# Patient Record
Sex: MALE | Race: WHITE | NOT HISPANIC OR LATINO | Employment: UNEMPLOYED | URBAN - METROPOLITAN AREA
[De-identification: names, ages, dates, MRNs, and addresses within clinical notes are randomized per-mention and may not be internally consistent; named-entity substitution may affect disease eponyms.]

---

## 2017-01-21 ENCOUNTER — LAB CONVERSION - ENCOUNTER (OUTPATIENT)
Dept: OTHER | Facility: OTHER | Age: 52
End: 2017-01-21

## 2017-01-21 LAB
HBA1C MFR BLD HPLC: 5.9 % OF TOTAL HGB
TSH SERPL DL<=0.05 MIU/L-ACNC: 1.28 MIU/L (ref 0.4–4.5)

## 2017-01-22 ENCOUNTER — LAB CONVERSION - ENCOUNTER (OUTPATIENT)
Dept: OTHER | Facility: OTHER | Age: 52
End: 2017-01-22

## 2017-01-22 ENCOUNTER — GENERIC CONVERSION - ENCOUNTER (OUTPATIENT)
Dept: OTHER | Facility: OTHER | Age: 52
End: 2017-01-22

## 2017-01-22 LAB
BILIRUB UR QL STRIP: NEGATIVE
COLOR UR: YELLOW
COMMENT (HISTORICAL): CLEAR
FECAL OCCULT BLOOD DIAGNOSTIC (HISTORICAL): NEGATIVE
GLUCOSE (HISTORICAL): NEGATIVE
KETONES UR STRIP-MCNC: NEGATIVE MG/DL
LEUKOCYTE ESTERASE UR QL STRIP: NEGATIVE
NITRITE UR QL STRIP: NEGATIVE
PH UR STRIP.AUTO: 7.5 [PH] (ref 5–8)
PROT UR STRIP-MCNC: NEGATIVE MG/DL
SP GR UR STRIP.AUTO: 1.02 (ref 1–1.03)

## 2017-03-15 ENCOUNTER — ALLSCRIPTS OFFICE VISIT (OUTPATIENT)
Dept: OTHER | Facility: OTHER | Age: 52
End: 2017-03-15

## 2017-06-16 DIAGNOSIS — C64.9 MALIGNANT NEOPLASM OF KIDNEY EXCLUDING RENAL PELVIS (HCC): ICD-10-CM

## 2017-07-18 ENCOUNTER — HOSPITAL ENCOUNTER (OUTPATIENT)
Dept: CT IMAGING | Facility: HOSPITAL | Age: 52
Discharge: HOME | End: 2017-07-18
Attending: SPECIALIST
Payer: COMMERCIAL

## 2017-07-18 DIAGNOSIS — C64.9 MALIGNANT NEOPLASM OF KIDNEY EXCLUDING RENAL PELVIS (HCC): ICD-10-CM

## 2017-07-18 DIAGNOSIS — M17.12 PRIMARY OSTEOARTHRITIS OF LEFT KNEE: ICD-10-CM

## 2017-07-18 PROCEDURE — 71260 CT THORAX DX C+: CPT

## 2017-07-18 PROCEDURE — 74177 CT ABD & PELVIS W/CONTRAST: CPT

## 2017-07-18 RX ADMIN — IODIXANOL 100 ML: 320 INJECTION, SOLUTION INTRAVASCULAR at 08:10

## 2017-07-19 ENCOUNTER — ALLSCRIPTS OFFICE VISIT (OUTPATIENT)
Dept: OTHER | Facility: OTHER | Age: 52
End: 2017-07-19

## 2017-07-19 ENCOUNTER — TRANSCRIBE ORDERS (OUTPATIENT)
Dept: ADMINISTRATIVE | Facility: HOSPITAL | Age: 52
End: 2017-07-19

## 2017-07-19 DIAGNOSIS — C64.9 ADENOCARCINOMA, RENAL CELL, UNSPECIFIED LATERALITY (HCC): Primary | ICD-10-CM

## 2017-08-10 ENCOUNTER — OFFICE VISIT (OUTPATIENT)
Dept: URGENT CARE | Facility: MEDICAL CENTER | Age: 52
End: 2017-08-10
Payer: COMMERCIAL

## 2017-08-10 ENCOUNTER — APPOINTMENT (OUTPATIENT)
Dept: RADIOLOGY | Facility: MEDICAL CENTER | Age: 52
End: 2017-08-10
Payer: COMMERCIAL

## 2017-08-10 DIAGNOSIS — M17.12 PRIMARY OSTEOARTHRITIS OF LEFT KNEE: ICD-10-CM

## 2017-08-10 PROCEDURE — S9083 URGENT CARE CENTER GLOBAL: HCPCS

## 2017-08-10 PROCEDURE — 73564 X-RAY EXAM KNEE 4 OR MORE: CPT

## 2017-08-10 PROCEDURE — G0382 LEV 3 HOSP TYPE B ED VISIT: HCPCS

## 2017-09-15 DIAGNOSIS — I10 ESSENTIAL (PRIMARY) HYPERTENSION: ICD-10-CM

## 2017-09-15 DIAGNOSIS — R73.9 HYPERGLYCEMIA: ICD-10-CM

## 2017-09-15 DIAGNOSIS — E78.5 HYPERLIPIDEMIA: ICD-10-CM

## 2017-09-21 ENCOUNTER — ALLSCRIPTS OFFICE VISIT (OUTPATIENT)
Dept: OTHER | Facility: OTHER | Age: 52
End: 2017-09-21

## 2017-10-04 ENCOUNTER — GENERIC CONVERSION - ENCOUNTER (OUTPATIENT)
Dept: OTHER | Facility: OTHER | Age: 52
End: 2017-10-04

## 2017-10-07 ENCOUNTER — LAB CONVERSION - ENCOUNTER (OUTPATIENT)
Dept: OTHER | Facility: OTHER | Age: 52
End: 2017-10-07

## 2017-10-07 LAB — CULTURE RESULT (HISTORICAL): NORMAL

## 2017-10-18 ENCOUNTER — ALLSCRIPTS OFFICE VISIT (OUTPATIENT)
Dept: OTHER | Facility: OTHER | Age: 52
End: 2017-10-18

## 2017-11-01 ENCOUNTER — ALLSCRIPTS OFFICE VISIT (OUTPATIENT)
Dept: OTHER | Facility: OTHER | Age: 52
End: 2017-11-01

## 2018-01-10 ENCOUNTER — ALLSCRIPTS OFFICE VISIT (OUTPATIENT)
Dept: OTHER | Facility: OTHER | Age: 53
End: 2018-01-10

## 2018-01-10 NOTE — PROGRESS NOTES
Chief Complaint  HERE TODAY FOR BP CHECK- LEFT /90      Active Problems    1  H/O renal cell carcinoma (V10 52) (Z85 528)   2  Hyperglycemia (790 29) (R73 9)   3  Hyperlipidemia (272 4) (E78 5)   4  Hypertension (401 9) (I10)   5  Left knee pain (719 46) (M25 562)   6  Left otitis externa (380 10) (H60 92)   7  History of 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(198 3) (C79 31)   8  Need for vaccination (V05 9) (Z23)   9  Osteoarthritis of left knee (715 96) (M17 12)   10  Renal cell carcinoma, unspecified laterality (189 0) (C64 9)   11  Right knee pain (719 46) (M25 561)   12  Rupture of left biceps tendon (840 8) (S46 212A)   13  Vitamin D deficiency (268 9) (E55 9)    Current Meds   1  [employer]; On Monday- Wednesday-Friday check sugars   fasting and at 4pm  On Hhhhrfs-Vwnwofpe-Brkdpawx check sugars 11am and 7pm;   Therapy: 21WWH1658 to (Evaluate:07Jan2018)  Requested for: 72Nch4283; Last   Rx:05Qxn6387 Ordered   2  Angela Microlet Lancets Miscellaneous; check fasting and 4pm sugars on Mon, Wed, and   Fri  Check 11AM and 7PMsugars on Tues, Thurs, and Sat; Therapy: 97EHW1167 to (Evaluate:01Owg4454)  Requested for: 47Zli9987; Last   Rx:19Flp1070 Ordered   3  Fenofibrate 160 MG Oral Tablet; take one tablet by mouth daily; Therapy: 94WQU2198 to (Chichi Morris)  Requested for: 06RBC2407; Last   Rx:05Jun2017 Ordered   4  HydrALAZINE HCl - 100 MG Oral Tablet; take one tablet by mouth twice daily; Therapy: 28YZK1402 to [phone]  Requested for: 22Twn7301; Last   Rx:91Ppl3554 Ordered   5  Hydrocortisone-Acetic Acid 1-2 % Otic Solution; INSTILL 3 DROP Twice daily; Therapy: 43RKU4741 to (Last Rx:04Oct2017)  Requested for: 60UIV7862 Ordered   6  LORazepam 0 5 MG Oral Tablet; TAKE 1 TABLET EVERY 12 HOURS AS NEEDED; Therapy: 49EVW8091 to (Evaluate:93Ipc4790)  Requested for: 81GET3577; Last   Rx:29Jun2016 Ordered   7   Simvastatin 80 MG Oral Tablet; take one tablet by mouth daily; Therapy: 94GWA6364 to (Evaluate:01Apr2018)  Requested for: 03Oct2017; Last   Rx:86Oqk2684 Ordered   8  Valsartan-Hydrochlorothiazide 320-25 MG Oral Tablet; Take 1 tablet daily; Therapy: 12QAU1915 to (Last Rx:04Oct2017)  Requested for: 98JAM1584 Ordered   9  Vitamin D3 2000 UNIT Oral Capsule; TAKE 2 CAPSULE Daily; Therapy: 71Eef2869 to Recorded    Allergies    1  Sulfa Drugs   2  NIFEdipine    Vitals  Signs    Systolic: 979  Diastolic: 90    Plan  Hypertension    · AmLODIPine Besylate 5 MG Oral Tablet; TAKE 1 TABLET DAILY    At amlodipine, over the in 2 weeks for blood pressure check       Future Appointments    Date/Time Provider Specialty Site   01/10/2018 09:30 AM Marybel Dotson DO Family Medicine [facility]     Signatures   Electronically signed by : Mackenzie Naidu DO; Oct 18 2017  8:59AM EST                       (Author)

## 2018-01-11 NOTE — RESULT NOTES
Verified Results  (1) EAR CULTURE [id number] 03:41PM Monica Walden Order Number: [id number]     Test Name Result Flag Reference   CLINICAL REPORT (Report)     Test:        Ear culture  Specimen Type: Other  Specimen Date:   7/18/2016 3:41 PM  Result Date:    7/20/2016 12:09 PM  Result Status:   Final result  Resulting Lab:   [facility] [address] [place] [address]            Tel: [phone]      CULTURE                                       ------------------                                   3+ Growth of Pseudomonas aeruginosa      STAIN                                        ------------------                                   No polys seen    2+ Gram negative rods      SUSCEPTIBILITY                                   ------------------                                                    Pseudomonas aeruginosa  METHOD              CELY  -------------------------------  -------------------------  AZTREONAM ($$$)          <=8 ug/ml   Susceptible  CEFEPIME ($)           <=8 ug/ml   Susceptible  CEFTAZIDIME ($$)         4 ug/ml    Susceptible  CIPROFLOXACIN ($)         <=1 00 ug/ml Susceptible  GENTAMICIN ($$)          >8 ug/ml   Resistant  IMIPENEM             <=4 ug/ml   Susceptible  LEVOFLOXACIN ($)         <=2 00 ug/ml Susceptible  MEROPENEM ($$)          <=4 00 ug/ml Susceptible  PIPERACILLIN + TAZOBACTAM ($$$)  >64 ug/ml   Resistant  TICARCILLIN/K CLAVULANATE     <=16 ug/ml  Susceptible  TOBRAMYCIN ($)          <=4 ug/ml   Susceptible       Discussion/Summary   Culture showed a bacteria that should be sensitive to the antibiotic drops I gave him  Let me know how he is doing

## 2018-01-11 NOTE — PROGRESS NOTES
Assessment   1  Hypertension (401 9) (I10)   2  Hyperlipidemia (272 4) (E78 5)   3  H/O renal cell carcinoma (V10 52) (Z85 528)   4  Hyperglycemia (790 29) (R73 9)   5  Vitamin D deficiency (268 9) (E55 9)   6  Palpitations (785 1) (R00 2)   7  Encounter for prostate cancer screening (V76 44) (Z12 5)    Plan   Encounter for prostate cancer screening    · (1) PSA (SCREEN) (Dx V76 44 Screen for Prostate Cancer); Status:Active; Requested    for:15Jrd4575;   Hyperglycemia    · (1) HEMOGLOBIN A1C; Status:Active; Requested for:73Sdd4898;   Hyperlipidemia    · (1) LIPID PANEL, FASTING; Status:Active; Requested for:84Gtu4251;    · (1) TSH; Status:Active; Requested for:07Gfy2043;   Hypertension    · (1) CBC/PLT/DIFF; Status:Active; Requested for:03Ghv9925;    · (1) COMPREHENSIVE METABOLIC PANEL; Status:Active; Requested for:50Qha1966;    · (1) URINALYSIS (will reflex a microscopy if leukocytes, occult blood, protein or nitrites are    not within normal limits); Status:Active; Requested for:77Rwe0202; Palpitations    · EKG/ECG- POC; Status:Active - Perform Order; Requested PGD:53VEY0971;   Vitamin D deficiency    · (1) VITAMIN D 25-HYDROXY; Status:Active; Requested for:83Wtq5575; Follow-up with Oncology in the summer  Due for lab work in April  Call the office if there are any problems  Continue current medications  Discussion/Summary   Recommend he decrease his caffeine consumption  If symptoms do not improve or if they get worse, I will refer him to Cardiology  Possible side effects of new medications were reviewed with the patient/guardian today  The treatment plan was reviewed with the patient/guardian  The patient/guardian understands and agrees with the treatment plan      Chief Complaint   follow up lipid,htn   no refills needed review blood work      History of Present Illness   Patient is a 49-year-old male presenting to the office for follow-up on his hypertension, hyperlipidemia, history of renal cell carcinoma, and general health  He is complaining of occasional palpitations  C2 mostly after meals in the morning  The do not occur with physical activity, stress, or any other triggering time  Review of Systems        Constitutional: recent 5 lb weight gain-- and-- Tends not to exercise in the winter  Eyes: No complaints of eye pain, no red eyes, no discharge from eyes, no itchy eyes  ENT: no complaints of earache, no hearing loss, no nosebleeds, no nasal discharge, no sore throat, no hoarseness  Cardiovascular: No complaints of slow heart rate, no fast heart rate, no chest pain, no palpitations, no leg claudication, no lower extremity  Respiratory: No complaints of shortness of breath, no wheezing, no cough, no SOB on exertion, no orthopnea or PND  Gastrointestinal: No complaints of abdominal pain, no constipation, no nausea or vomiting, no diarrhea or bloody stools  Genitourinary: No complaints of dysuria, no incontinence, no hesitancy, no nocturia, no genital lesion, no testicular pain  Musculoskeletal: No complaints of arthralgia, no myalgias, no joint swelling or stiffness, no limb pain or swelling  Integumentary: No complaints of skin rash or skin lesions, no itching, no skin wound, no dry skin  Neurological: No compliants of headache, no confusion, no convulsions, no numbness or tingling, no dizziness or fainting, no limb weakness, no difficulty walking  Psychiatric: Is not suicidal, no sleep disturbances, no anxiety or depression, no change in personality, no emotional problems  Endocrine: No complaints of proptosis, no hot flashes, no muscle weakness, no erectile dysfunction, no deepening of the voice, no feelings of weakness  Hematologic/Lymphatic: No complaints of swollen glands, no swollen glands in the neck, does not bleed easily, no easy bruising  Active Problems   1  H/O renal cell carcinoma (V10 52) (Z88 291)   2  Hyperglycemia (790 29) (R73 9)   3  Hyperlipidemia (272 4) (E78 5)   4  Hypertension (401 9) (I10)   5  History of 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(198 3) (C79 31)   6  Osteoarthritis of left knee (715 96) (M17 12)   7  Renal cell carcinoma, unspecified laterality (189 0) (C64 9)   8  Vitamin D deficiency (268 9) (E55 9)    Past Medical History   1  History of Acute bronchitis (466 0) (J20 9)   2  History of Acute otitis externa of right ear, unspecified type (380 10) (H60 501)   3  History of Acute swimmer's ear of both sides (380 12) (H60 333)   4  History of Anal fissure (565 0) (K60 2)   5  History of Colonoscopy (Fiberoptic) Screening   6  History of CT Lung Pulmonary Nodule (< 6cm )   7  History of Elbow pain (719 42) (M25 529)   8  History of Encounter for prostate cancer screening (V76 44) (Z12 5)   9  H/O renal cell carcinoma (V10 52) (Z85 528)   10  History of anxiety disorder (V11 8) (Z86 59)   11  History of Left elbow contusion (923 11) (S50 02XA)   12  History of Left knee pain (719 46) (M25 562)   13  History of Left knee pain (719 46) (M25 562)   14  History of Left knee pain (719 46) (M25 562)   15  History of Left otitis externa (380 10) (H60 92)   16  History of Malignant neoplasm of kidney excluding renal pelvis, unspecified laterality      (189 0) (C64 9)   17  History of 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(198 3) (C79 31)   18  History of Need for vaccination (V05 9) (Z23)   19  History of Overweight (278 02) (E66 3)   20  History of Perirectal Cellulitis (566)   21  History of Rectal bleeding (569 3) (K62 5)   22  History of Right knee pain (719 46) (M25 561)   23  History of Rupture of left biceps tendon (840 8) (B61 578M)     The active problems and past medical history were reviewed and updated today  Surgical History   1  History of Closed Treatment Of Bimalleolar Ankle Fracture   2  History of Colonoscopy (Fiberoptic) Screening   3  History of Nephrectomy   4   History of Umbilical Hernia Repair    Family History   Mother    1  No pertinent family history  Family History    2  Family history of Diabetes Mellitus (V18 0)   3  Family history of Hypertension (V17 49)   4  Family history of Visual Impairment (V19 0)    Social History    · Being A Social Drinker   · Denied: History of Drug Use   · Former smoker (F60 68) (C57 261)  The social history was reviewed and updated today  The social history was reviewed and is unchanged  Current Meds    1  AmLODIPine Besylate 5 MG Oral Tablet; TAKE 1 TABLET DAILY; Therapy: 98YCD5734 to (Evaluate:13Oct2018)  Requested for: 92KQC0978; Last     Rx:18Oct2017 Ordered   2  [employer]; On Monday- Wednesday-Friday check sugars     fasting and at 4pm  On Dfnenxd-Hlwhimfn-Mshlaazk check sugars 11am and 7pm;     Therapy: 65GMX2347 to (Evaluate:07Jan2018)  Requested for: 47Zzy1202; Last     Rx:63Jrv2433 Ordered   3  Ang[employer] Lancets Miscellaneous; check fasting and 4pm sugars on Mon, Wed, and     Fri  Check 11AM and 7PMsugars on Tues, Thurs, and Sat; Therapy: 73UFN7978 to (Evaluate:12May2018)  Requested for: 56Nsa6024; Last     Rx:63Uil4946 Ordered   4  Fenofibrate 160 MG Oral Tablet; take one tablet by mouth daily; Therapy: 97GKT8121 to (Mercedes Smith)  Requested for: 09HLF6811; Last     Rx:05Jun2017 Ordered   5  HydrALAZINE HCl - 100 MG Oral Tablet; take one tablet by mouth twice daily; Therapy: 65CRP1316 to (Abdon Macdonald)  Requested for: 74LMH6822; Last     Rx:02Nov2017 Ordered   6  Hydrocortisone-Acetic Acid 1-2 % Otic Solution; INSTILL 3 DROP Twice daily; Therapy: 71OXR6301 to (Last Rx:04Oct2017)  Requested for: 82PPI3920 Ordered   7  LORazepam 0 5 MG Oral Tablet; TAKE 1 TABLET EVERY 12 HOURS AS NEEDED; Therapy: 26BEW4775 to (Evaluate:91Tpe1776)  Requested for: 77XNL7198; Last     Rx:29Jun2016 Ordered   8  Simvastatin 80 MG Oral Tablet; take one tablet by mouth daily;      Therapy: 55XCH4195 to (Evaluate:01Apr2018) Requested for: 74SBU7851; Last     Rx:03Oct2017 Ordered   9  Valsartan-Hydrochlorothiazide 320-25 MG Oral Tablet; Take 1 tablet daily; Therapy: 88SGM8752 to (Last Rx:04Oct2017)  Requested for: 78WJD6270 Ordered   10  Vitamin D3 2000 UNIT Oral Capsule; TAKE 2 CAPSULE Daily; Therapy: 09Mbi9775 to Recorded     The medication list was reviewed and updated today  Allergies   1  Sulfa Drugs   2  NIFEdipine    Vitals   Vital Signs    Recorded: 42LZY4964 11:07KH   Systolic 424   Diastolic 80   Height 5 ft 11 in   Weight 243 lb    BMI Calculated 33 89   BSA Calculated 2 29     Physical Exam        Constitutional      General appearance: Abnormal   overweight  Eyes      Conjunctiva and lids: No swelling, erythema, or discharge  Pupils and irises: Equal, round and reactive to light  Ears, Nose, Mouth, and Throat      External inspection of ears and nose: Normal        Otoscopic examination: Tympanic membrance translucent with normal light reflex  Canals patent without erythema  Nasal mucosa, septum, and turbinates: Normal without edema or erythema  Oropharynx: Normal with no erythema, edema, exudate or lesions  Pulmonary      Respiratory effort: No increased work of breathing or signs of respiratory distress  Auscultation of lungs: Clear to auscultation, equal breath sounds bilaterally, no wheezes, no rales, no rhonci  Cardiovascular      Palpation of heart: Normal PMI, no thrills  Auscultation of heart: Normal rate and rhythm, normal S1 and S2, without murmurs  Examination of extremities for edema and/or varicosities: Normal        Carotid pulses: Normal        Abdomen      Abdomen: Non-tender, no masses  Liver and spleen: No hepatomegaly or splenomegaly  Lymphatic      Palpation of lymph nodes in neck: No lymphadenopathy  Musculoskeletal      Gait and station: Normal        Digits and nails: Normal without clubbing or cyanosis  Inspection/palpation of joints, bones, and muscles: Normal        Skin      Skin and subcutaneous tissue: Normal without rashes or lesions  Neurologic      Cranial nerves: Cranial nerves 2-12 intact  Reflexes: 2+ and symmetric  Sensation: No sensory loss  Psychiatric      Orientation to person, place and time: Normal        Mood and affect: Normal           Results/Data   A 12 lead ECG was performed and was normal -- No acute ischemia  Rhythm and rate: normal sinus rhythm        P-waves: the P wave is normal       QRS: the QRS is normal       ST segment: the ST segments are normal       Signatures    Electronically signed by : Josefa Valdez DO; Kendall 10 2018  9:59AM EST                       (Author)

## 2018-01-11 NOTE — RESULT NOTES
Verified Results  (Q) URINALYSIS REFLEX [id number] 08:46AM [facility]   REPORT COMMENT:  SPLIT 01/20/2017 FROM [id number]     Test Name Result Flag Reference   COLOR YELLOW  YELLOW   APPEARANCE CLEAR  CLEAR   SPECIFIC GRAVITY 1 018  1 001-1 035   [facility] 7 5  5 0-8 0   GLUCOSE NEGATIVE  NEGATIVE   BILIRUBIN NEGATIVE  NEGATIVE   KETONES NEGATIVE  NEGATIVE   OCCULT BLOOD NEGATIVE  NEGATIVE   PROTEIN NEGATIVE  NEGATIVE   NITRITE NEGATIVE  NEGATIVE   LEUKOCYTE ESTERASE NEGATIVE  NEGATIVE     (1) COMPREHENSIVE METABOLIC PANEL 98OUR7591 10:03FU [facility]     Test Name Result Flag Reference   GLUCOSE 99 mg/dL  65-99   Fasting reference interval   UREA NITROGEN (BUN) 22 mg/dL  7-25   CREATININE 1 23 mg/dL  0 70-1 33   For patients >52years of age, the reference limit  for Creatinine is approximately 13% higher for people  identified as -American  eGFR NON-AFR  AMERICAN 68 mL/min/1 73m2  > OR = 60   eGFR AFRICAN AMERICAN 78 mL/min/1 73m2  > OR = 60   AST 19 U/L  10-35   ALT 29 U/L  9-46   PROTEIN, TOTAL 7 0 g/dL  6 1-8 1   ALBUMIN 4 2 g/dL  3 6-5 1   GLOBULIN 2 8 g/dL (calc)  1 9-3 7   ALBUMIN/GLOBULIN RATIO 1 5 (calc)  1 0-2 5   BILIRUBIN, TOTAL 0 6 mg/dL  0 2-1 2   ALKALINE PHOSPHATASE 36 U/L L    BUN/CREATININE RATIO   0-48   NOT APPLICABLE (calc)   SODIUM 136 mmol/L  135-146   POTASSIUM 4 2 mmol/L  3 5-5 3   CHLORIDE 102 mmol/L     CARBON DIOXIDE 24 mmol/L  20-31   CALCIUM 9 2 mg/dL  8 6-10 3     (1) LIPID PANEL, FASTING 20Jan2017 11:07AM [facility]     Test Name Result Flag Reference   CHOLESTEROL, TOTAL 166 mg/dL  125-200   HDL CHOLESTEROL 37 mg/dL L > OR = 40   TRIGLICERIDES 666 mg/dL  <150   LDL-CHOLESTEROL 99 mg/dL (calc)  <130   Desirable range <100 mg/dL for patients with CHD or  diabetes and <70 mg/dL for diabetic patients with  known heart disease     CHOL/HDLC RATIO 4 5 (calc)  < OR = 5 0   NON HDL CHOLESTEROL 129 mg/dL (calc)     Target for non-HDL cholesterol is 30 mg/dL higher than LDL cholesterol target  (1) CBC/PLT/DIFF 67NXY8800 11:07AM Kimberly Jarrell     Test Name Result Flag Reference   WHITE BLOOD CELL COUNT 6 4 Thousand/uL  3 8-10 8   RED BLOOD CELL COUNT 5 10 Million/uL  4 20-5 80   HEMOGLOBIN 15 2 g/dL  13 2-17 1   HEMATOCRIT 45 4 %  38 5-50 0   MCV 89 1 fL  80 0-100 0   MCH 29 9 pg  27 0-33 0   EOSINOPHILS 3 9 %     BASOPHILS 0 4 %     ABSOLUTE MONOCYTES 736 cells/uL  200-950   ABSOLUTE EOSINOPHILS 250 cells/uL     ABSOLUTE BASOPHILS 26 cells/uL  0-200   NEUTROPHILS 62 9 %     LYMPHOCYTES 21 3 %     MONOCYTES 11 5 %     MCHC 33 5 g/dL  32 0-36 0   RDW 13 6 %  11 0-15 0   PLATELET COUNT 642 Thousand/uL  140-400   MPV 8 7 fL  7 5-11 5   ABSOLUTE NEUTROPHILS 4026 cells/uL  2384-6753   ABSOLUTE LYMPHOCYTES 1363 cells/uL  850-3900     (Q) TSH, 3RD GENERATION 20Jan2017 11:07AM Kimberly Jarrell     Test Name Result Flag Reference   TSH 1 28 mIU/L  0 40-4 50     (Q) HEMOGLOBIN A1c 20Jan2017 11:07AM Kimberly Jarrell   REPORT COMMENT:  FASTING:YES  AN UPDATE OR CORRECTION HAS BEEN MADE TO NAME COLLECTION KIT     Test Name Result Flag Reference   HEMOGLOBIN A1c 5 9 % of total Hgb H <5 7   According to ADA guidelines, hemoglobin A1c <7 0%  represents optimal control in non-pregnant diabetic  patients  Different metrics may apply to specific  patient populations  Standards of Medical Care in    Diabetes Care  2013;36:s11-s66     For the purpose of screening for the presence of  diabetes  <5 7%       Consistent with the absence of diabetes  5 7-6 4%    Consistent with increased risk for diabetes              (prediabetes)  >or=6 5%    Consistent with diabetes     This assay result is consistent with an increased risk  of diabetes  Currently, no consensus exists for use of hemoglobin  A1c for diagnosis of diabetes for children  Discussion/Summary   Lab work is okay  Continue current medications  Will discuss when he comes in for his next checkup

## 2018-01-12 NOTE — RESULT NOTES
Verified Results  (1) LIPID PANEL, FASTING [id number] 10:09AM Shiva Spice     Test Name Result Flag Reference   CHOLESTEROL, TOTAL 166 mg/dL  125-200   HDL CHOLESTEROL 33 mg/dL L > OR = 40   TRIGLICERIDES 852 mg/dL  <150   LDL-CHOLESTEROL 104 mg/dL (calc)  <130   Desirable range <100 mg/dL for patients with CHD or  diabetes and <70 mg/dL for diabetic patients with  known heart disease  CHOL/HDLC RATIO 5 0 (calc)  < OR = 5 0   NON HDL CHOLESTEROL 133 mg/dL (calc)     Target for non-HDL cholesterol is 30 mg/dL higher than   LDL cholesterol target  (1) COMPREHENSIVE METABOLIC PANEL [id number] 44:20ZW Shiva Spice     Test Name Result Flag Reference   GLUCOSE 95 mg/dL  65-99   Fasting reference interval   UREA NITROGEN (BUN) 21 mg/dL  7-25   CREATININE 1 29 mg/dL  0 70-1 33   For patients >52years of age, the reference limit  for Creatinine is approximately 13% higher for people  identified as -American  eGFR NON-AFR   AMERICAN 64 mL/min/1 73m2  > OR = 60   eGFR AFRICAN AMERICAN 74 mL/min/1 73m2  > OR = 60   BUN/CREATININE RATIO   9-05   NOT APPLICABLE (calc)   SODIUM 135 mmol/L  135-146   POTASSIUM 4 1 mmol/L  3 5-5 3   CHLORIDE 103 mmol/L     CARBON DIOXIDE 27 mmol/L  19-30   CALCIUM 9 4 mg/dL  8 6-10 3   PROTEIN, TOTAL 7 0 g/dL  6 1-8 1   ALBUMIN 4 3 g/dL  3 6-5 1   GLOBULIN 2 7 g/dL (calc)  1 9-3 7   ALBUMIN/GLOBULIN RATIO 1 6 (calc)  1 0-2 5   BILIRUBIN, TOTAL 0 6 mg/dL  0 2-1 2   ALKALINE PHOSPHATASE 26 U/L L    AST 25 U/L  10-35   ALT 26 U/L  9-46     (1) URINALYSIS w URINE C/S REFLEX (will reflex a microscopy if leukocytes, occult blood, or nitrites are not within normal limits) [id number] 10:09AM Shiva Spice     Test Name Result Flag Reference   COLOR YELLOW  YELLOW   APPEARANCE CLEAR  CLEAR   SPECIFIC GRAVITY 1 006  1 001-1 035   PH 7 5  5 0-8 0   GLUCOSE NEGATIVE  NEGATIVE   BILIRUBIN NEGATIVE  NEGATIVE   KETONES NEGATIVE  NEGATIVE   OCCULT BLOOD NEGATIVE  NEGATIVE   PROTEIN NEGATIVE  NEGATIVE   NITRITE NEGATIVE  NEGATIVE   LEUKOCYTE ESTERASE NEGATIVE  NEGATIVE   WBC NONE SEEN /HPF  < OR = 5   RBC NONE SEEN /HPF  < OR = 2   SQUAMOUS EPITHELIAL CELLS NONE SEEN /HPF  < OR = 5   BACTERIA NONE SEEN /HPF  NONE SEEN   HYALINE CAST NONE SEEN /LPF  NONE SEEN   REFLEXIVE URINE CULTURE NO CULTURE INDICATED       (1) CBC/PLT/DIFF [id number] 10:09AM Marisol Wadsworth     Test Name Result Flag Reference   WHITE BLOOD CELL COUNT 6 6 Thousand/uL  3 8-10 8   RED BLOOD CELL COUNT 4 93 Million/uL  4 20-5 80   HEMOGLOBIN 14 6 g/dL  13 2-17 1   HEMATOCRIT 45 3 %  38 5-50 0   MCV 92 0 fL  80 0-100 0   MCH 29 7 pg  27 0-33 0   MCHC 32 3 g/dL  32 0-36 0   RDW 13 8 %  11 0-15 0   PLATELET COUNT 218 Thousand/uL  140-400   MPV 9 4 fL  7 5-11 5   ABSOLUTE NEUTROPHILS 4330 cells/uL  4864-3488   ABSOLUTE LYMPHOCYTES 1412 cells/uL  850-3900   ABSOLUTE MONOCYTES 594 cells/uL  200-950   ABSOLUTE EOSINOPHILS 218 cells/uL     ABSOLUTE BASOPHILS 46 cells/uL  0-200   NEUTROPHILS 65 6 %     LYMPHOCYTES 21 4 %     MONOCYTES 9 0 %     EOSINOPHILS 3 3 %     BASOPHILS 0 7 %       (Q) TSH, 3RD GENERATION [id number] 10:09AM Marisol Lovely     Test Name Result Flag Reference   TSH 1 54 mIU/L  0 40-4 50     (Q) HEMOGLOBIN A1c [id number] 10:09AM Marisol Lovely   REPORT COMMENT:  FASTING:YES     Test Name Result Flag Reference   HEMOGLOBIN A1c 5 7 % of total Hgb H <5 7   According to ADA guidelines, hemoglobin A1c <7 0%  represents optimal control in non-pregnant diabetic  patients  Different metrics may apply to specific  patient populations  Standards of Medical Care in    Diabetes Care  2013;36:s11-s66     For the purpose of screening for the presence of  diabetes  <5 7%       Consistent with the absence of diabetes  5 7-6 4%    Consistent with increased risk for diabetes              (prediabetes)  >or=6 5%    Consistent with diabetes     This assay result is consistent with an increased risk  of diabetes  Currently, no consensus exists for use of hemoglobin  A1c for diagnosis of diabetes for children  Discussion/Summary   Sugar is much better  Cholesterol is okay  kidney numbers, CBC, liver are all okay

## 2018-01-13 VITALS — DIASTOLIC BLOOD PRESSURE: 82 MMHG | SYSTOLIC BLOOD PRESSURE: 130 MMHG | BODY MASS INDEX: 34.31 KG/M2 | WEIGHT: 246 LBS

## 2018-01-13 VITALS — DIASTOLIC BLOOD PRESSURE: 90 MMHG | SYSTOLIC BLOOD PRESSURE: 140 MMHG

## 2018-01-14 VITALS
HEIGHT: 71 IN | RESPIRATION RATE: 18 BRPM | TEMPERATURE: 98.7 F | DIASTOLIC BLOOD PRESSURE: 80 MMHG | WEIGHT: 241 LBS | HEART RATE: 97 BPM | OXYGEN SATURATION: 98 % | BODY MASS INDEX: 33.74 KG/M2 | SYSTOLIC BLOOD PRESSURE: 148 MMHG

## 2018-01-14 VITALS
WEIGHT: 236 LBS | SYSTOLIC BLOOD PRESSURE: 140 MMHG | DIASTOLIC BLOOD PRESSURE: 78 MMHG | BODY MASS INDEX: 33.04 KG/M2 | HEIGHT: 71 IN

## 2018-01-17 NOTE — PROGRESS NOTES
Chief Complaint  HERE TODAY FOR BP CHECK- LFT /72      Active Problems    1  H/O renal cell carcinoma (V10 52) (Z85 528)   2  Hyperglycemia (790 29) (R73 9)   3  Hyperlipidemia (272 4) (E78 5)   4  Hypertension (401 9) (I10)   5  Left knee pain (719 46) (M25 562)   6  Left otitis externa (380 10) (H60 92)   7  History of 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(198 3) (C79 31)   8  Need for vaccination (V05 9) (Z23)   9  Osteoarthritis of left knee (715 96) (M17 12)   10  Renal cell carcinoma, unspecified laterality (189 0) (C64 9)   11  Right knee pain (719 46) (M25 561)   12  Rupture of left biceps tendon (840 8) (S46 212A)   13  Vitamin D deficiency (268 9) (E55 9)    Current Meds   1  AmLODIPine Besylate 5 MG Oral Tablet; TAKE 1 TABLET DAILY; Therapy: 90CDF6045 to (Evaluate:13Oct2018)  Requested for: 11XRP5464; Last   Rx:18Oct2017 Ordered   2  140[employer] In [employer]; On Monday- Wednesday-Friday check sugars   fasting and at 4pm  On Syweyyu-Phdrxmhf-Tjpbgxxj check sugars 11am and 7pm;   Therapy: 38ZQT4063 to (Evaluate:07Jan2018)  Requested for: 48Vbe6339; Last   Rx:14Gxp5927 Ordered   3  Angela Microlet Lancets Miscellaneous; check fasting and 4pm sugars on Mon, Wed, and   Fri  Check 11AM and 7PMsugars on Tues, Thurs, and Sat; Therapy: 18REW3017 to (Evaluate:91Sqm4266)  Requested for: 51Bbc6111; Last   Rx:86Hio5328 Ordered   4  Fenofibrate 160 MG Oral Tablet; take one tablet by mouth daily; Therapy: 64AOU5233 to (Kimberly Oas)  Requested for: 10BID6408; Last   Rx:05Jun2017 Ordered   5  HydrALAZINE HCl - 100 MG Oral Tablet; take one tablet by mouth twice daily; Therapy: 07PTV0700 to ([phone])  Requested for: 22Oyt9648; Last   Rx:43Odp4803 Ordered   6  Hydrocortisone-Acetic Acid 1-2 % Otic Solution; INSTILL 3 DROP Twice daily; Therapy: 10ZZZ2096 to (Last Rx:04Oct2017)  Requested for: 66TEG3065 Ordered   7   LORazepam 0 5 MG Oral Tablet; TAKE 1 TABLET EVERY 12 HOURS AS NEEDED; Therapy: 89KZX6169 to (Evaluate:48Hcn4994)  Requested for: 91PGZ5917; Last   Rx:29Jun2016 Ordered   8  Simvastatin 80 MG Oral Tablet; take one tablet by mouth daily; Therapy: 03PTQ7824 to (Evaluate:01Apr2018)  Requested for: 03Oct2017; Last   Rx:03Oct2017 Ordered   9  Valsartan-Hydrochlorothiazide 320-25 MG Oral Tablet; Take 1 tablet daily; Therapy: 22MUX3483 to (Last Rx:04Oct2017)  Requested for: 55JIX3686 Ordered   10  Vitamin D3 2000 UNIT Oral Capsule; TAKE 2 CAPSULE Daily; Therapy: 41Cnj8231 to Recorded    Allergies    1  Sulfa Drugs   2  NIFEdipine    Vitals  Signs    Systolic: 308  Diastolic: 72    Discussion/Summary  Blood pressure is improved  Continue current medications  Recheck in the office in 3 months       Future Appointments    Date/Time Provider Specialty Site   01/10/2018 09:30 AM Sariah Dupree DO Family Medicine [address]     Signatures   Electronically signed by : Kareen Trinidad DO; Nov 1 2017  9:27AM EST                       (Author)

## 2018-01-18 NOTE — PROGRESS NOTES
Assessment    1  Left elbow contusion (923 11) (S50 02XA)    Plan   * XR ELBOW 3+ VIEW LEFT; Status:Complete - Retrospective By Protocol Authorization;   Done: 62NTT7934 12:00AM  HQQ:81JAZ6269; Last Updated By:RUDOLPH, Provider; 1/25/2016 9:32:58 AM;Ordered;    For:Elbow pain; Ordered By:Rhonda Matthew;      Discussion/Summary  Discussion Summary:   Negative x-ray of left elbow  Rest, ice, compression, elevation  Ice 15-20 minutes 3-4 times a day  Ace wrap for support  Ibuprofen and Tylenol for pain relief  Monitor for severe worsening of pain, numbness and weakness, male dexterity changes, cold sensation in the fingers  With these symptoms, presented to ER immediately for further evaluation  Otherwise followup with PCP in 5-7 days if no better  Medication Side Effects Reviewed: Possible side effects of new medications were reviewed with the patient/guardian today  Understands and agrees with treatment plan: The treatment plan was reviewed with the patient/guardian  The patient/guardian understands and agrees with the treatment plan   Counseling Documentation With Imm: The patient, patient's family was counseled regarding diagnostic results, instructions for management, risk factor reductions, prognosis, patient and family education, impressions, risks and benefits of treatment options, importance of compliance with treatment  Follow Up Instructions: Follow Up with your Primary Care Provider in 5-7 days  If your symptoms worsen, go to the nearest [place] [address] Emergency Department  Chief Complaint    1  Elbow Pain  Chief Complaint Free Text Note Form: Pt states he slipped on running board of truck hitting left elbow  Has swelling and pain left elbow  History of Present Illness  HPI: 14-year-old right-handed male presents with one-day history of left elbow pain which began the patient slipped on ice and striking his left elbow on the running board of his truck   He denies head injury, loss of consciousness  He denies numbness, weakness, and dexterity changes  Attempted Tylenol and ice without relief  No previous injury   Hospital Based Practices Required Assessment:   Pain Assessment   the patient states they have pain  The pain is located in the left elbow  The patient describes the pain as sharp  (on a scale of 0 to 10, the patient rates the pain at 9 )   Abuse And Domestic Violence Screen   Domestic violence screen not done today  Reason DV Screen not done: family in room    Depression And Suicide Screen  Suicide screen not done today  Reason suicide screen not done: family in room  Prefered Language is  english  Primary Language is  english  Review of Systems  Focused-Male:   Musculoskeletal: as noted in HPI  Integumentary: as noted in HPI  Neurological: as noted in HPI  Active Problems    1  Elbow pain (719 42) (M25 529)   2  H/O renal cell carcinoma (V10 52) (Z85 528)   3  Hyperglycemia (790 29) (R73 9)   4  Hyperlipidemia (272 4) (E78 5)   5  Hypertension (401 9) (I10)   6  History of 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(198 3) (C79 31)   7  Renal cell carcinoma, unspecified laterality (189 0) (C64 9)   8  Vitamin D deficiency (268 9) (E55 9)    Past Medical History    1  History of Acute bronchitis (466 0) (J20 9)   2  History of Colonoscopy (Fiberoptic) Screening   3  History of CT Lung Pulmonary Nodule (< 6cm )   4  History of Encounter for prostate cancer screening (V76 44) (Z12 5)   5  H/O renal cell carcinoma (V10 52) (Z85 528)   6  History of anxiety disorder (V11 8) (Z86 59)   7  History of Malignant neoplasm of kidney excluding renal pelvis, unspecified laterality   (189 0) (C64 9)   8  History of 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(198 3) (C79 31)   9  History of Overweight (278 02) (E66 3)   10  History of Perirectal Cellulitis (566)   11  History of Screening for prostate cancer (V76 44) (Z12 5)    Family History    1  No pertinent family history    2   Family history of Diabetes Mellitus (V18 0)   3  Family history of Hypertension (V17 49)   4  Family history of Visual Impairment (V19 0)    Social History    · Being A Social Drinker   · Denied: History of Drug Use   · Former smoker (J64 00) (T02 982)    Surgical History    1  History of Closed Treatment Of Bimalleolar Ankle Fracture   2  History of Colonoscopy (Fiberoptic) Screening   3  History of Nephrectomy   4  History of Umbilical Hernia Repair    Current Meds   1  Angela Contour Next Test In [employer]; Avfgyp-Ymddxvtlr-Pmuius check sugars fasting   and 4 PM  Wjytner-Bejeoeuv-Mddnctky check sugars 11 AM and 7 PM;   Therapy: 88LLD3466 to (Last Rx:29Iut0159)  Requested for: 15Uda8199 Ordered   2  Angela Microlet Lancets Miscellaneous; Check fasting and 4 PM sugars on Monday,   Wednesday, and Friday  Check 11 AM and 7 PM sugars on Tuesday, Thursday, and   Saturday; Therapy: 73GMD6933 to (Last Rx:41Qef2619)  Requested for: 83Cia9192 Ordered   3  Fenofibrate 160 MG Oral Tablet; take one tablet by mouth daily; Therapy: 24EIL3971 to (Last UI:71ESD9778)  Requested for: 09SPA4716 Ordered   4  HydrALAZINE HCl - 50 MG Oral Tablet; TAKE 1 TABLET TWICE DAILY; Therapy: 30CCS3215 to (Evaluate:26Kdr4726)  Requested for: 49Qdu9850; Last   Rx:19Tsw4281 Ordered   5  Lisinopril-Hydrochlorothiazide 20-12 5 MG Oral Tablet; TAKE TWO TABLETS BY MOUTH   DAILY; Therapy: 24VRO6141 to (Evaluate:87Gsk6803)  Requested for: 33XVY7065; Last   Rx:09Uwg1243 Ordered   6  LORazepam 0 5 MG Oral Tablet; TAKE 1 TABLET EVERY 12 HOURS AS NEEDED; Therapy: 32CSK6023 to (Ria Nova)  Requested for: 27QFS2426; Last   MR:88KRQ9984 Ordered   7  Simvastatin 80 MG Oral Tablet; TAKE ONE TABLET BY MOUTH ONCE DAILY AS   DIRECTED; Therapy: 13DUS8129 to (Evaluate:20Jun2016)  Requested for: 20Vyd5262; Last   Rx:34Qno1167 Ordered   8  Vitamin D3 2000 UNIT Oral Capsule; TAKE 2 CAPSULE Daily; Therapy: 35Egg8411 to Recorded    Allergies    1  Sulfa Drugs   2  NIFEdipine    Vitals  Signs [Data Includes: Current Encounter]   Recorded: 10ZMW0280 07:57PM   Temperature: 98 4 F  Heart Rate: 76  Respiration: 20  Systolic: 504  Diastolic: 96  Height: 5 ft 11 in  Weight: 253 lb   BMI Calculated: 35 29  BSA Calculated: 2 33  O2 Saturation: 100  Pain Scale: 9    Physical Exam    Constitutional   General appearance: No acute distress, well appearing and well nourished  Musculoskeletal Capillary refill less than 2 seconds  Radial pulse 2+  Full range of motion left elbow  Tender to palpation along area of edema and ecchymosis on the medial aspect of the left elbow  5/5  strength, elbow flexion/extension, wrist flexion/extension  Neurologic   Sensation: No sensory loss  Results/Data  Diagnostic Studies Reviewed: I personally reviewed the films/images/results in the office today  My interpretation follows  X-ray Review Left elbow: No acute fracture or dislocation        Future Appointments    Date/Time Provider Specialty Site   03/23/2016 09:30 AM Jacy Maria DO Family Medicine [facility]     Signatures   Electronically signed by : Priti Montemayor, [facility]; Jan 25 2016 12:52PM EST                       (Author)    Electronically signed by : ROXANN Warren ; Jan 28 2016  9:52PM EST                       (Co-author)

## 2018-01-22 VITALS
HEIGHT: 71 IN | WEIGHT: 238.5 LBS | BODY MASS INDEX: 33.39 KG/M2 | SYSTOLIC BLOOD PRESSURE: 140 MMHG | TEMPERATURE: 97.6 F | DIASTOLIC BLOOD PRESSURE: 72 MMHG

## 2018-01-22 VITALS
HEIGHT: 71 IN | WEIGHT: 243 LBS | SYSTOLIC BLOOD PRESSURE: 132 MMHG | BODY MASS INDEX: 34.02 KG/M2 | DIASTOLIC BLOOD PRESSURE: 80 MMHG

## 2018-01-22 VITALS — DIASTOLIC BLOOD PRESSURE: 72 MMHG | SYSTOLIC BLOOD PRESSURE: 120 MMHG

## 2018-03-31 DIAGNOSIS — E78.2 MIXED HYPERLIPIDEMIA: Primary | ICD-10-CM

## 2018-03-31 DIAGNOSIS — I10 ESSENTIAL HYPERTENSION: ICD-10-CM

## 2018-04-01 RX ORDER — SIMVASTATIN 80 MG
TABLET ORAL
Qty: 30 TABLET | Refills: 4 | Status: SHIPPED | OUTPATIENT
Start: 2018-04-01 | End: 2018-08-19 | Stop reason: SDUPTHER

## 2018-04-01 RX ORDER — VALSARTAN AND HYDROCHLOROTHIAZIDE 320; 25 MG/1; MG/1
TABLET, FILM COATED ORAL
Qty: 30 TABLET | Refills: 0 | Status: SHIPPED | OUTPATIENT
Start: 2018-04-01 | End: 2018-04-02 | Stop reason: SDUPTHER

## 2018-04-02 DIAGNOSIS — I10 ESSENTIAL HYPERTENSION: ICD-10-CM

## 2018-04-02 RX ORDER — VALSARTAN AND HYDROCHLOROTHIAZIDE 320; 25 MG/1; MG/1
1 TABLET, FILM COATED ORAL DAILY
Qty: 30 TABLET | Refills: 3 | Status: SHIPPED | OUTPATIENT
Start: 2018-04-02 | End: 2018-05-02 | Stop reason: SDUPTHER

## 2018-04-10 DIAGNOSIS — E55.9 VITAMIN D DEFICIENCY: ICD-10-CM

## 2018-04-10 DIAGNOSIS — Z12.5 ENCOUNTER FOR SCREENING FOR MALIGNANT NEOPLASM OF PROSTATE: ICD-10-CM

## 2018-04-10 DIAGNOSIS — R73.9 HYPERGLYCEMIA: ICD-10-CM

## 2018-04-10 DIAGNOSIS — E78.5 HYPERLIPIDEMIA: ICD-10-CM

## 2018-04-10 DIAGNOSIS — I10 ESSENTIAL (PRIMARY) HYPERTENSION: ICD-10-CM

## 2018-04-11 LAB
25(OH)D3 SERPL-MCNC: 16 NG/ML (ref 30–100)
ALBUMIN SERPL-MCNC: 4.5 G/DL (ref 3.6–5.1)
ALBUMIN/GLOB SERPL: 1.7 (CALC) (ref 1–2.5)
ALP SERPL-CCNC: 34 U/L (ref 40–115)
ALT SERPL-CCNC: 29 U/L (ref 9–46)
APPEARANCE UR: CLEAR
AST SERPL-CCNC: 21 U/L (ref 10–35)
BASOPHILS # BLD AUTO: 57 CELLS/UL (ref 0–200)
BASOPHILS NFR BLD AUTO: 0.8 %
BILIRUB SERPL-MCNC: 0.7 MG/DL (ref 0.2–1.2)
BILIRUB UR QL STRIP: NEGATIVE
BUN SERPL-MCNC: 25 MG/DL (ref 7–25)
BUN/CREAT SERPL: ABNORMAL (CALC) (ref 6–22)
CALCIUM SERPL-MCNC: 9.7 MG/DL (ref 8.6–10.3)
CHLORIDE SERPL-SCNC: 104 MMOL/L (ref 98–110)
CHOLEST SERPL-MCNC: 164 MG/DL
CHOLEST/HDLC SERPL: 4.3 (CALC)
CO2 SERPL-SCNC: 26 MMOL/L (ref 20–31)
COLOR UR: YELLOW
CREAT SERPL-MCNC: 1.3 MG/DL (ref 0.7–1.33)
EOSINOPHIL # BLD AUTO: 192 CELLS/UL (ref 15–500)
EOSINOPHIL NFR BLD AUTO: 2.7 %
ERYTHROCYTE [DISTWIDTH] IN BLOOD BY AUTOMATED COUNT: 13.2 % (ref 11–15)
GLOBULIN SER CALC-MCNC: 2.6 G/DL (CALC) (ref 1.9–3.7)
GLUCOSE SERPL-MCNC: 98 MG/DL (ref 65–99)
GLUCOSE UR QL STRIP: NEGATIVE
HBA1C MFR BLD: 5.4 % OF TOTAL HGB
HCT VFR BLD AUTO: 47.5 % (ref 38.5–50)
HDLC SERPL-MCNC: 38 MG/DL
HGB BLD-MCNC: 15.7 G/DL (ref 13.2–17.1)
HGB UR QL STRIP: NEGATIVE
KETONES UR QL STRIP: NEGATIVE
LDLC SERPL CALC-MCNC: 100 MG/DL (CALC)
LEUKOCYTE ESTERASE UR QL STRIP: NEGATIVE
LYMPHOCYTES # BLD AUTO: 1647 CELLS/UL (ref 850–3900)
LYMPHOCYTES NFR BLD AUTO: 23.2 %
MCH RBC QN AUTO: 29.3 PG (ref 27–33)
MCHC RBC AUTO-ENTMCNC: 33.1 G/DL (ref 32–36)
MCV RBC AUTO: 88.8 FL (ref 80–100)
MONOCYTES # BLD AUTO: 632 CELLS/UL (ref 200–950)
MONOCYTES NFR BLD AUTO: 8.9 %
NEUTROPHILS # BLD AUTO: 4572 CELLS/UL (ref 1500–7800)
NEUTROPHILS NFR BLD AUTO: 64.4 %
NITRITE UR QL STRIP: NEGATIVE
NONHDLC SERPL-MCNC: 126 MG/DL (CALC)
PH UR STRIP: 7.5 [PH] (ref 5–8)
PLATELET # BLD AUTO: 276 THOUSAND/UL (ref 140–400)
PMV BLD REES-ECKER: 10.5 FL (ref 7.5–12.5)
POTASSIUM SERPL-SCNC: 4.3 MMOL/L (ref 3.5–5.3)
PROT SERPL-MCNC: 7.1 G/DL (ref 6.1–8.1)
PROT UR QL STRIP: NEGATIVE
PSA SERPL-MCNC: 1 NG/ML
RBC # BLD AUTO: 5.35 MILLION/UL (ref 4.2–5.8)
SL AMB EGFR AFRICAN AMERICAN: 73 ML/MIN/1.73M2
SL AMB EGFR NON AFRICAN AMERICAN: 63 ML/MIN/1.73M2
SODIUM SERPL-SCNC: 138 MMOL/L (ref 135–146)
SP GR UR STRIP: 1.02 (ref 1–1.03)
TRIGL SERPL-MCNC: 166 MG/DL
TSH SERPL-ACNC: 1.55 MIU/L (ref 0.4–4.5)
WBC # BLD AUTO: 7.1 THOUSAND/UL (ref 3.8–10.8)

## 2018-04-12 ENCOUNTER — TELEPHONE (OUTPATIENT)
Dept: FAMILY MEDICINE CLINIC | Facility: CLINIC | Age: 53
End: 2018-04-12

## 2018-04-12 DIAGNOSIS — E55.9 VITAMIN D DEFICIENCY: Primary | ICD-10-CM

## 2018-04-12 PROBLEM — R00.2 PALPITATIONS: Status: ACTIVE | Noted: 2018-01-10

## 2018-04-12 PROBLEM — M17.12 OSTEOARTHRITIS OF LEFT KNEE: Status: ACTIVE | Noted: 2017-03-15

## 2018-04-12 RX ORDER — ERGOCALCIFEROL 1.25 MG/1
50000 CAPSULE ORAL WEEKLY
Qty: 12 CAPSULE | Refills: 0 | Status: SHIPPED | OUTPATIENT
Start: 2018-04-12 | End: 2020-02-19 | Stop reason: ALTCHOICE

## 2018-04-12 RX ORDER — FENOFIBRATE 160 MG/1
TABLET ORAL
COMMUNITY
Start: 2018-03-02 | End: 2018-08-29 | Stop reason: SDUPTHER

## 2018-04-12 RX ORDER — AMLODIPINE BESYLATE 5 MG/1
1 TABLET ORAL DAILY
COMMUNITY
Start: 2017-10-18 | End: 2018-12-27 | Stop reason: SDUPTHER

## 2018-04-12 RX ORDER — HYDRALAZINE HYDROCHLORIDE 100 MG/1
TABLET, FILM COATED ORAL
COMMUNITY
Start: 2018-03-25 | End: 2018-05-06 | Stop reason: SDUPTHER

## 2018-04-12 NOTE — TELEPHONE ENCOUNTER
I sent in a 3 month supply to the pharmacy  He will take it once a week  Recheck numbers in 6 months

## 2018-05-02 DIAGNOSIS — I10 ESSENTIAL HYPERTENSION: ICD-10-CM

## 2018-05-02 RX ORDER — VALSARTAN AND HYDROCHLOROTHIAZIDE 320; 25 MG/1; MG/1
1 TABLET, FILM COATED ORAL DAILY
Qty: 30 TABLET | Refills: 0 | Status: SHIPPED | OUTPATIENT
Start: 2018-05-02 | End: 2018-09-28 | Stop reason: SDUPTHER

## 2018-05-06 DIAGNOSIS — I10 ESSENTIAL HYPERTENSION: Primary | ICD-10-CM

## 2018-05-06 RX ORDER — HYDRALAZINE HYDROCHLORIDE 100 MG/1
TABLET, FILM COATED ORAL
Qty: 60 TABLET | Refills: 4 | Status: SHIPPED | OUTPATIENT
Start: 2018-05-06 | End: 2018-11-26 | Stop reason: SDUPTHER

## 2018-07-07 LAB
ALBUMIN SERPL-MCNC: 4.7 G/DL (ref 3.6–5.1)
ALBUMIN/GLOB SERPL: 1.7 (CALC) (ref 1–2.5)
ALP SERPL-CCNC: 42 U/L (ref 40–115)
ALT SERPL-CCNC: 32 U/L (ref 9–46)
AST SERPL-CCNC: 23 U/L (ref 10–35)
BASOPHILS # BLD AUTO: 48 CELLS/UL (ref 0–200)
BASOPHILS NFR BLD AUTO: 0.6 %
BILIRUB SERPL-MCNC: 0.7 MG/DL (ref 0.2–1.2)
BUN SERPL-MCNC: 20 MG/DL (ref 7–25)
BUN/CREAT SERPL: ABNORMAL (CALC) (ref 6–22)
CALCIUM SERPL-MCNC: 10.4 MG/DL (ref 8.6–10.3)
CHLORIDE SERPL-SCNC: 104 MMOL/L (ref 98–110)
CO2 SERPL-SCNC: 27 MMOL/L (ref 20–31)
CREAT SERPL-MCNC: 1.3 MG/DL (ref 0.7–1.33)
EOSINOPHIL # BLD AUTO: 192 CELLS/UL (ref 15–500)
EOSINOPHIL NFR BLD AUTO: 2.4 %
ERYTHROCYTE [DISTWIDTH] IN BLOOD BY AUTOMATED COUNT: 13.1 % (ref 11–15)
GLOBULIN SER CALC-MCNC: 2.8 G/DL (CALC) (ref 1.9–3.7)
GLUCOSE SERPL-MCNC: 89 MG/DL (ref 65–139)
HCT VFR BLD AUTO: 49 % (ref 38.5–50)
HGB BLD-MCNC: 16.3 G/DL (ref 13.2–17.1)
LYMPHOCYTES # BLD AUTO: 1752 CELLS/UL (ref 850–3900)
LYMPHOCYTES NFR BLD AUTO: 21.9 %
MCH RBC QN AUTO: 29.9 PG (ref 27–33)
MCHC RBC AUTO-ENTMCNC: 33.3 G/DL (ref 32–36)
MCV RBC AUTO: 89.9 FL (ref 80–100)
MONOCYTES # BLD AUTO: 768 CELLS/UL (ref 200–950)
MONOCYTES NFR BLD AUTO: 9.6 %
NEUTROPHILS # BLD AUTO: 5240 CELLS/UL (ref 1500–7800)
NEUTROPHILS NFR BLD AUTO: 65.5 %
PLATELET # BLD AUTO: 279 THOUSAND/UL (ref 140–400)
PMV BLD REES-ECKER: 10.4 FL (ref 7.5–12.5)
POTASSIUM SERPL-SCNC: 4.6 MMOL/L (ref 3.5–5.3)
PROT SERPL-MCNC: 7.5 G/DL (ref 6.1–8.1)
RBC # BLD AUTO: 5.45 MILLION/UL (ref 4.2–5.8)
SL AMB EGFR AFRICAN AMERICAN: 73 ML/MIN/1.73M2
SL AMB EGFR NON AFRICAN AMERICAN: 63 ML/MIN/1.73M2
SODIUM SERPL-SCNC: 140 MMOL/L (ref 135–146)
WBC # BLD AUTO: 8 THOUSAND/UL (ref 3.8–10.8)

## 2018-07-10 ENCOUNTER — HOSPITAL ENCOUNTER (OUTPATIENT)
Dept: CT IMAGING | Facility: HOSPITAL | Age: 53
Discharge: HOME | End: 2018-07-10
Attending: SPECIALIST
Payer: COMMERCIAL

## 2018-07-10 DIAGNOSIS — C64.9 ADENOCARCINOMA, RENAL CELL, UNSPECIFIED LATERALITY (HCC): ICD-10-CM

## 2018-07-10 PROCEDURE — 74177 CT ABD & PELVIS W/CONTRAST: CPT

## 2018-07-10 PROCEDURE — 71260 CT THORAX DX C+: CPT

## 2018-07-10 RX ADMIN — IOHEXOL 100 ML: 350 INJECTION, SOLUTION INTRAVENOUS at 08:58

## 2018-07-11 ENCOUNTER — OFFICE VISIT (OUTPATIENT)
Dept: FAMILY MEDICINE CLINIC | Facility: CLINIC | Age: 53
End: 2018-07-11
Payer: COMMERCIAL

## 2018-07-11 VITALS
WEIGHT: 240 LBS | HEIGHT: 72 IN | BODY MASS INDEX: 32.51 KG/M2 | DIASTOLIC BLOOD PRESSURE: 78 MMHG | SYSTOLIC BLOOD PRESSURE: 122 MMHG

## 2018-07-11 DIAGNOSIS — M89.8X8 ILIAC CREST BONE PAIN: ICD-10-CM

## 2018-07-11 DIAGNOSIS — C64.9 RENAL CELL CARCINOMA, UNSPECIFIED LATERALITY (HCC): ICD-10-CM

## 2018-07-11 DIAGNOSIS — E55.9 VITAMIN D DEFICIENCY: ICD-10-CM

## 2018-07-11 DIAGNOSIS — E78.2 MIXED HYPERLIPIDEMIA: ICD-10-CM

## 2018-07-11 DIAGNOSIS — R73.9 HYPERGLYCEMIA: ICD-10-CM

## 2018-07-11 DIAGNOSIS — I10 ESSENTIAL HYPERTENSION: Primary | ICD-10-CM

## 2018-07-11 DIAGNOSIS — Z12.5 SCREENING FOR PROSTATE CANCER: ICD-10-CM

## 2018-07-11 PROCEDURE — 3074F SYST BP LT 130 MM HG: CPT | Performed by: FAMILY MEDICINE

## 2018-07-11 PROCEDURE — 3078F DIAST BP <80 MM HG: CPT | Performed by: FAMILY MEDICINE

## 2018-07-11 PROCEDURE — 99214 OFFICE O/P EST MOD 30 MIN: CPT | Performed by: FAMILY MEDICINE

## 2018-07-11 NOTE — PROGRESS NOTES
Assessment/Plan:    Essential hypertension  Stable on current medications  Continue the same  Recheck in 6 months  Mixed hyperlipidemia  Stable on current medications  Recheck in 6 months  Vitamin D deficiency  Recheck lab in 6 months  Continue vitamin-D for now  Renal cell carcinoma ([facility] [address] )  Patient is 10 years out from original diagnosis  Had a CT scan yesterday  Awaiting results  Diagnoses and all orders for this visit:    Essential hypertension  -     Comprehensive metabolic panel; Future  -     CBC and differential; Future  -     Urinalysis with reflex to microscopic; Future    Vitamin D deficiency  -     Vitamin D 25 hydroxy; Future    Mixed hyperlipidemia  -     Comprehensive metabolic panel; Future  -     Lipid panel; Future  -     TSH, 3rd generation; Future    Renal cell carcinoma, unspecified laterality (HCC)  -     Comprehensive metabolic panel; Future  -     CBC and differential; Future  -     Urinalysis with reflex to microscopic; Future    Hyperglycemia  -     Hemoglobin A1C; Future  -     Urinalysis with reflex to microscopic; Future    Screening for prostate cancer  -     PSA, Total Screen; Future          Subjective:   Chief Complaint   Patient presents with    Hypertension    Hyperlipidemia     no refills needed           Patient ID: Rukhsana Ta  is a 46 y o  male  Patient is a 70-year-old male presenting to the office for follow-up on his hypertension, hyperlipidemia, reflux, weight, history of renal carcinoma  He had a CT scan done yesterday in follow-up for Oncology  Those results are not available just yet  Had recent lab work which he would like to review  He has managed to lose 8 lb through diet and exercise  Overall he feels well  One complaint today is pain over his left iliac crest   Denies any recent trauma or overuse, however he did do a lot of yd work a couple weeks ago          The following portions of the patient's history were reviewed and updated as appropriate: allergies, current medications, past family history, past medical history, past social history, past surgical history and problem list     Review of Systems   Constitutional: Negative for appetite change, chills, diaphoresis, fatigue, fever and unexpected weight change  HENT: Negative for congestion, ear pain, hearing loss, nosebleeds, postnasal drip, rhinorrhea, sinus pressure, sore throat, tinnitus and trouble swallowing  Eyes: Negative for photophobia, pain, discharge, redness, itching and visual disturbance  Respiratory: Negative for cough, chest tightness, shortness of breath and wheezing  Cardiovascular: Negative for chest pain, palpitations and leg swelling  Denies orthopnea , dyspnea on exertion   Gastrointestinal: Negative for abdominal distention, abdominal pain, blood in stool, constipation, diarrhea, nausea and vomiting  Endocrine: Negative  Genitourinary: Negative for difficulty urinating, dysuria, flank pain, frequency, hematuria and urgency  Denies nocturia , erectile dysfunction   Musculoskeletal: Positive for arthralgias (As per the HPI)  Negative for back pain, gait problem, joint swelling and myalgias  Skin: Negative for pallor, rash and wound  Denies skin lesions   Allergic/Immunologic: Negative for environmental allergies, food allergies and immunocompromised state  Neurological: Negative for dizziness, tremors, seizures, syncope, speech difficulty, weakness, numbness and headaches  Hematological: Negative for adenopathy  Does not bruise/bleed easily  Psychiatric/Behavioral: Negative for behavioral problems, confusion, sleep disturbance and suicidal ideas  The patient is not nervous/anxious  Objective:      /78   Ht 6' (1 829 m)   Wt 109 kg (240 lb)   BMI 32 55 kg/m²          Physical Exam   Constitutional: He is oriented to person, place, and time  He appears well-developed and well-nourished     Over weight HENT:   Head: Normocephalic and atraumatic  Nose: Nose normal    Mouth/Throat: Oropharynx is clear and moist    Eyes: Conjunctivae and EOM are normal  Pupils are equal, round, and reactive to light  Neck: Normal range of motion  Neck supple  No JVD present  No tracheal deviation present  No thyromegaly present  Cardiovascular: Normal rate, regular rhythm and intact distal pulses  No murmur heard  Pulmonary/Chest: Effort normal and breath sounds normal  He has no wheezes  He has no rales  Abdominal: Soft  Bowel sounds are normal  He exhibits no mass  There is no tenderness  There is no rebound and no guarding  Musculoskeletal: He exhibits no edema, tenderness or deformity  Lymphadenopathy:     He has no cervical adenopathy  Neurological: He is alert and oriented to person, place, and time  He has normal reflexes  No cranial nerve deficit  He exhibits normal muscle tone  Coordination normal    Skin: Skin is warm and dry  No lesion and no rash noted  Nails show no clubbing  Psychiatric: He has a normal mood and affect   Judgment normal

## 2018-07-17 NOTE — PROGRESS NOTES
Hematology/Oncology Outpatient Follow- up Note  Ronni Mittal  46 y o  male MRN: @ Encounter: [id number]        Date:  7/17/2018        Assessment / Plan:    1  History of renal cell carcinoma, status post nephrectomy 2/3/2009, who is with no evidence of disease  He had CT scan, which did not reveal any evidence of recurrent disease  He continues to have a 2 mm stable lung nodule, which has been present for many years  He will continue annual follow up with Dr Reginaldo Jasmine  He had CT scans on 7/10, which were unremarkable  His labs from 7/6 were reviewed  He will follow up with us in one year with a chest x-ray, CBC, CMP, and LDH  Subjective:   CC: follow up regarding RCC      HPI:    See previous history    Interval History:    Overall the patient is doing well  HE has a good energy level with an ECOG performance status of zero  He has a good appetite and his weight has been stable  He denies fevers, chills ,CP, SOB, N/V, diarrhea, all other ROS are unremarkable  PFSH was reviewed  Cancer Staging:  History of RCC      Previous Hematologic/ Oncologic History:    #1 left nephrectomy 2/3/09  #2 SRS with Ronald Guerrero and Dr Beckie Ly 3/9/09     Current Hematologic/ Oncologic Treatment:    observation          Test Results:    Imaging: Ct Chest Abdomen Pelvis W Contrast    Result Date: 7/12/2018  Narrative: CT CHEST, ABDOMEN AND PELVIS WITH IV CONTRAST INDICATION:   C64 9: Malignant neoplasm of unspecified kidney, except renal pelvis  Follow-up evaluation  COMPARISON: None  TECHNIQUE: CT examination of the chest, abdomen and pelvis was performed  Axial, sagittal, and coronal 2D reformatted images were created from the source data and submitted for interpretation  Radiation dose length product (DLP) for this visit:  1147 mGy-cm     This examination, like all CT scans performed in the [facility], was performed utilizing techniques to minimize radiation dose exposure, including the use of iterative reconstruction and automated exposure control  IV Contrast:  100 mL of iohexol (OMNIPAQUE) Enteric Contrast: Enteric contrast was administered  FINDINGS: CHEST LUNGS:  Calcified granuloma medial aspect of the left upper lobe image 20, series 2 is stable  Scarring anteriorly in the right lung is stable  Right basilar scarring, laterally  PLEURA:  Unremarkable  HEART/GREAT VESSELS:  Atherosclerotic calcifications noted  MEDIASTINUM AND DEMOND:  A few calcified mediastinal lymph nodes and left hilar lymph nodes noted  CHEST WALL AND LOWER NECK:   Unremarkable  ABDOMEN LIVER/BILIARY TREE:  One or more subcentimeter sharply circumscribed low-density hepatic lesion(s) are noted, too small to accurately characterize, but statistically most likely to represent subcentimeter hepatic cysts  No suspicious solid hepatic lesion is identified  Hepatic contours are normal   No biliary dilatation  GALLBLADDER:  No calcified gallstones  No pericholecystic inflammatory change  SPLEEN:  Mildly enlarged measuring 13 9 cm  A few small punctate calcified granulomata noted  PANCREAS:  Unremarkable  ADRENAL GLANDS:  Unremarkable  KIDNEYS/URETERS:  Status post left nephrectomy with expected postoperative changes  No evidence of recurrent or residual mass  No retroperitoneal collection  Mild compensatory hypertrophy of the right kidney noted  2 1 cm simple cyst lower pole right  kidney noted  STOMACH AND BOWEL:  Unremarkable  APPENDIX:  No findings to suggest appendicitis  ABDOMINOPELVIC CAVITY:  No ascites or free intraperitoneal air  No lymphadenopathy  VESSELS:  Atherosclerotic changes are present  No evidence of aneurysm  PELVIS REPRODUCTIVE ORGANS:  Unremarkable for patient's age  URINARY BLADDER:  Unremarkable  ABDOMINAL WALL/INGUINAL REGIONS:  Unremarkable  OSSEOUS STRUCTURES:  Scattered spondylotic changes noted       Impression: Stable CT of the chest, abdomen and pelvis without evidence recurrent or metastatic disease  Workstation performed: [id number]       Labs:   Lab Results   Component Value Date    WBC 6 4 01/20/2017    HGB 16 3 07/06/2018    HCT 49 0 07/06/2018    MCV 89 9 07/06/2018     07/06/2018     Lab Results   Component Value Date     01/20/2017    K 4 2 01/20/2017     01/20/2017    CO2 24 01/20/2017    ANIONGAP 9 05/29/2014    BUN 20 07/06/2018    CREATININE 1 30 07/06/2018    GLUCOSE 113 05/29/2014    CALCIUM 10 4 (H) 07/06/2018    AST 19 01/20/2017    ALT 29 01/20/2017    ALKPHOS 36 (L) 01/20/2017    PROT 7 0 01/20/2017    BILITOT 0 6 01/20/2017         No results found for: SPEP, UPEP    No results found for: PSA    No results found for: CEA    No results found for:     No results found for: AFP    No results found for: IRON, TIBC, FERRITIN    No results found for: FDCECEJC61      ROS: Review of Systems   Constitutional: Negative  HENT: Negative  Eyes: Negative  Respiratory: Negative  Cardiovascular: Negative  Gastrointestinal: Negative  Endocrine: Negative  Genitourinary: Negative  Musculoskeletal: Negative  Skin: Negative  Allergic/Immunologic: Negative  Neurological: Negative  Hematological: Negative  Psychiatric/Behavioral: Negative  Current Medications: Reviewed  Allergies: Reviewed  PMH/FH/SH:  Reviewed      Physical Exam:    There is no height or weight on file to calculate BSA  Wt Readings from Last 3 Encounters:   07/11/18 109 kg (240 lb)   01/10/18 110 kg (243 lb)   10/04/17 108 kg (238 lb 8 oz)        Temp Readings from Last 3 Encounters:   10/04/17 97 6 °F (36 4 °C)   07/19/17 98 7 °F (37 1 °C)   07/20/16 99 4 °F (37 4 °C)        BP Readings from Last 3 Encounters:   07/11/18 122/78   01/10/18 132/80   11/01/17 120/72         Pulse Readings from Last 3 Encounters:   07/19/17 97   07/20/16 96   12/09/15 78     @LASTSAO2(3)@      Physical Exam   Constitutional: He is oriented to person, place, and time   He appears well-developed and well-nourished  HENT:   Head: Normocephalic and atraumatic  Mouth/Throat: Oropharynx is clear and moist    Eyes: Conjunctivae and EOM are normal  Pupils are equal, round, and reactive to light  Neck: Normal range of motion  Neck supple  No thyromegaly present  Cardiovascular: Normal rate, regular rhythm and normal heart sounds  Pulmonary/Chest: Effort normal    Abdominal: Soft  Bowel sounds are normal  He exhibits no distension and no mass  There is no tenderness  Musculoskeletal: Normal range of motion  He exhibits no edema  Lymphadenopathy:     He has no cervical adenopathy  Neurological: He is alert and oriented to person, place, and time  He has normal reflexes  Skin: Skin is warm and dry  No erythema  Psychiatric: He has a normal mood and affect  Vitals reviewed  Goals and Barriers:  Current Goal: Curative intent  Barriers: None  Patient's Capacity to Self Care:  Patient fully able to self care      Code Status: [unfilled]  Advance Directive and Living Will:      Power of :

## 2018-07-18 ENCOUNTER — OFFICE VISIT (OUTPATIENT)
Dept: HEMATOLOGY ONCOLOGY | Facility: CLINIC | Age: 53
End: 2018-07-18
Payer: COMMERCIAL

## 2018-07-18 VITALS
WEIGHT: 242.5 LBS | HEIGHT: 71 IN | DIASTOLIC BLOOD PRESSURE: 78 MMHG | OXYGEN SATURATION: 98 % | RESPIRATION RATE: 14 BRPM | SYSTOLIC BLOOD PRESSURE: 138 MMHG | BODY MASS INDEX: 33.95 KG/M2 | HEART RATE: 94 BPM | TEMPERATURE: 99.4 F

## 2018-07-18 DIAGNOSIS — C64.9 RENAL CELL CARCINOMA, UNSPECIFIED LATERALITY (HCC): Primary | ICD-10-CM

## 2018-07-18 PROCEDURE — 99213 OFFICE O/P EST LOW 20 MIN: CPT | Performed by: SPECIALIST

## 2018-07-18 NOTE — LETTER
July 18, 2018     Andria Ramos, [address] [facility]  [address]    Patient: Lani Mustafa  YOB: 1965   Date of Visit: 7/18/2018       Dear Dr Deo Buck: Thank you for referring Yadi Dominique to me for evaluation  Below are my notes for this consultation  If you have questions, please do not hesitate to call me  I look forward to following your patient along with you  Sincerely,        Jose Antonio Skaggs MD        CC: MD Jose Antonio Nelson MD  7/17/2018  7:46 PM  Sign at close encounter  Hematology/Oncology Outpatient Follow- up Note  Lani Mustafa  46 y o  male MRN: @ Encounter: [id number]        Date:  7/17/2018        Assessment / Plan:    1  History of renal cell carcinoma, status post nephrectomy 2/3/2009, who is with no evidence of disease  He had CT scan, which did not reveal any evidence of recurrent disease  He continues to have a 2 mm stable lung nodule, which has been present for many years  He will continue annual follow up with Dr Laquita Dickey  He had CT scans on 7/10, which were unremarkable  His labs from 7/6 were reviewed  He will follow up with us in one year with a CBC, CMP, and LDH  Subjective:   CC: follow up regarding RCC      HPI:    See previous history    Interval History:          Cancer Staging:  History of RCC      Previous Hematologic/ Oncologic History:    #1 left nephrectomy 2/3/09  #2 SRS with Ronald Guerrero and Dr Dave Sparks 3/9/09     Current Hematologic/ Oncologic Treatment:    observation          Test Results:    Imaging: Ct Chest Abdomen Pelvis W Contrast    Result Date: 7/12/2018  Narrative: CT CHEST, ABDOMEN AND PELVIS WITH IV CONTRAST INDICATION:   C64 9: Malignant neoplasm of unspecified kidney, except renal pelvis  Follow-up evaluation  COMPARISON: None  TECHNIQUE: CT examination of the chest, abdomen and pelvis was performed   Axial, sagittal, and coronal 2D reformatted images were created from the source data and submitted for interpretation  Radiation dose length product (DLP) for this visit:  1147 mGy-cm   This examination, like all CT scans performed in the [facility], was performed utilizing techniques to minimize radiation dose exposure, including the use of iterative reconstruction and automated exposure control  IV Contrast:  100 mL of iohexol (OMNIPAQUE) Enteric Contrast: Enteric contrast was administered  FINDINGS: CHEST LUNGS:  Calcified granuloma medial aspect of the left upper lobe image 20, series 2 is stable  Scarring anteriorly in the right lung is stable  Right basilar scarring, laterally  PLEURA:  Unremarkable  HEART/GREAT VESSELS:  Atherosclerotic calcifications noted  MEDIASTINUM AND DEMOND:  A few calcified mediastinal lymph nodes and left hilar lymph nodes noted  CHEST WALL AND LOWER NECK:   Unremarkable  ABDOMEN LIVER/BILIARY TREE:  One or more subcentimeter sharply circumscribed low-density hepatic lesion(s) are noted, too small to accurately characterize, but statistically most likely to represent subcentimeter hepatic cysts  No suspicious solid hepatic lesion is identified  Hepatic contours are normal   No biliary dilatation  GALLBLADDER:  No calcified gallstones  No pericholecystic inflammatory change  SPLEEN:  Mildly enlarged measuring 13 9 cm  A few small punctate calcified granulomata noted  PANCREAS:  Unremarkable  ADRENAL GLANDS:  Unremarkable  KIDNEYS/URETERS:  Status post left nephrectomy with expected postoperative changes  No evidence of recurrent or residual mass  No retroperitoneal collection  Mild compensatory hypertrophy of the right kidney noted  2 1 cm simple cyst lower pole right  kidney noted  STOMACH AND BOWEL:  Unremarkable  APPENDIX:  No findings to suggest appendicitis  ABDOMINOPELVIC CAVITY:  No ascites or free intraperitoneal air  No lymphadenopathy  VESSELS:  Atherosclerotic changes are present  No evidence of aneurysm   PELVIS REPRODUCTIVE ORGANS:  Unremarkable for patient's age  URINARY BLADDER:  Unremarkable  ABDOMINAL WALL/INGUINAL REGIONS:  Unremarkable  OSSEOUS STRUCTURES:  Scattered spondylotic changes noted  Impression: Stable CT of the chest, abdomen and pelvis without evidence recurrent or metastatic disease  Workstation performed: [id number]       Labs:   Lab Results   Component Value Date    WBC 6 4 01/20/2017    HGB 16 3 07/06/2018    HCT 49 0 07/06/2018    MCV 89 9 07/06/2018     07/06/2018     Lab Results   Component Value Date     01/20/2017    K 4 2 01/20/2017     01/20/2017    CO2 24 01/20/2017    ANIONGAP 9 05/29/2014    BUN 20 07/06/2018    CREATININE 1 30 07/06/2018    GLUCOSE 113 05/29/2014    CALCIUM 10 4 (H) 07/06/2018    AST 19 01/20/2017    ALT 29 01/20/2017    ALKPHOS 36 (L) 01/20/2017    PROT 7 0 01/20/2017    BILITOT 0 6 01/20/2017         No results found for: SPEP, UPEP    No results found for: PSA    No results found for: CEA    No results found for:     No results found for: AFP    No results found for: IRON, TIBC, FERRITIN    No results found for: [id number]      ROS: Review of Systems      Current Medications: Reviewed  Allergies: Reviewed  PMH/FH/SH:  Reviewed      Physical Exam:    There is no height or weight on file to calculate BSA  Wt Readings from Last 3 Encounters:   07/11/18 109 kg (240 lb)   01/10/18 110 kg (243 lb)   10/04/17 108 kg (238 lb 8 oz)        Temp Readings from Last 3 Encounters:   10/04/17 97 6 °F (36 4 °C)   07/19/17 98 7 °F (37 1 °C)   07/20/16 99 4 °F (37 4 °C)        BP Readings from Last 3 Encounters:   07/11/18 122/78   01/10/18 132/80   11/01/17 120/72         Pulse Readings from Last 3 Encounters:   07/19/17 97   07/20/16 96   12/09/15 78     @LASTSAO2(3)@      Physical Exam      Goals and Barriers:  Current Goal: Curative intent  Barriers: None  Patient's Capacity to Self Care:  Patient fully able to self care      Code Status: [unfilled]  Advance Directive and Living Will:      Power of :

## 2018-07-19 DIAGNOSIS — C64.9 MALIGNANT NEOPLASM OF KIDNEY EXCLUDING RENAL PELVIS (HCC): ICD-10-CM

## 2018-08-19 DIAGNOSIS — E78.2 MIXED HYPERLIPIDEMIA: ICD-10-CM

## 2018-08-19 RX ORDER — SIMVASTATIN 80 MG
TABLET ORAL
Qty: 30 TABLET | Refills: 3 | Status: SHIPPED | OUTPATIENT
Start: 2018-08-19 | End: 2018-12-27 | Stop reason: SDUPTHER

## 2018-08-29 DIAGNOSIS — E78.1 HYPERTRIGLYCERIDEMIA: Primary | ICD-10-CM

## 2018-08-29 RX ORDER — FENOFIBRATE 160 MG/1
TABLET ORAL
Qty: 90 TABLET | Refills: 3 | Status: SHIPPED | OUTPATIENT
Start: 2018-08-29 | End: 2020-02-19 | Stop reason: ALTCHOICE

## 2018-09-18 ENCOUNTER — TELEPHONE (OUTPATIENT)
Dept: FAMILY MEDICINE CLINIC | Facility: CLINIC | Age: 53
End: 2018-09-18

## 2018-09-18 DIAGNOSIS — M25.552 LEFT HIP PAIN: Primary | ICD-10-CM

## 2018-09-18 NOTE — TELEPHONE ENCOUNTER
We should get an x-ray, and then depending on that either physical therapy or have him see Orthopedics    Look on the printer

## 2018-09-28 DIAGNOSIS — I10 ESSENTIAL HYPERTENSION: ICD-10-CM

## 2018-09-28 RX ORDER — VALSARTAN AND HYDROCHLOROTHIAZIDE 320; 25 MG/1; MG/1
TABLET, FILM COATED ORAL
Qty: 30 TABLET | Refills: 0 | Status: CANCELLED | OUTPATIENT
Start: 2018-09-28

## 2018-09-28 RX ORDER — LOSARTAN POTASSIUM AND HYDROCHLOROTHIAZIDE 25; 100 MG/1; MG/1
1 TABLET ORAL DAILY
Qty: 90 TABLET | Refills: 1 | Status: SHIPPED | OUTPATIENT
Start: 2018-09-28 | End: 2018-09-30 | Stop reason: SDUPTHER

## 2018-09-29 DIAGNOSIS — I10 ESSENTIAL HYPERTENSION: ICD-10-CM

## 2018-09-29 RX ORDER — VALSARTAN AND HYDROCHLOROTHIAZIDE 320; 25 MG/1; MG/1
TABLET, FILM COATED ORAL
Qty: 30 TABLET | Refills: 0 | Status: CANCELLED | OUTPATIENT
Start: 2018-09-29

## 2018-09-30 RX ORDER — LOSARTAN POTASSIUM AND HYDROCHLOROTHIAZIDE 25; 100 MG/1; MG/1
1 TABLET ORAL DAILY
Qty: 90 TABLET | Refills: 1 | Status: SHIPPED | OUTPATIENT
Start: 2018-09-30 | End: 2018-10-28 | Stop reason: SDUPTHER

## 2018-10-17 ENCOUNTER — APPOINTMENT (OUTPATIENT)
Dept: RADIOLOGY | Facility: MEDICAL CENTER | Age: 53
End: 2018-10-17
Payer: COMMERCIAL

## 2018-10-17 DIAGNOSIS — M25.552 LEFT HIP PAIN: ICD-10-CM

## 2018-10-17 PROCEDURE — 73502 X-RAY EXAM HIP UNI 2-3 VIEWS: CPT

## 2018-10-22 ENCOUNTER — TELEPHONE (OUTPATIENT)
Dept: FAMILY MEDICINE CLINIC | Facility: CLINIC | Age: 53
End: 2018-10-22

## 2018-10-22 DIAGNOSIS — M25.552 CHRONIC LEFT HIP PAIN: Primary | ICD-10-CM

## 2018-10-22 DIAGNOSIS — G89.29 CHRONIC LEFT HIP PAIN: Primary | ICD-10-CM

## 2018-10-28 DIAGNOSIS — I10 ESSENTIAL HYPERTENSION: ICD-10-CM

## 2018-10-28 RX ORDER — VALSARTAN AND HYDROCHLOROTHIAZIDE 320; 25 MG/1; MG/1
TABLET, FILM COATED ORAL
Qty: 30 TABLET | Refills: 0 | Status: CANCELLED | OUTPATIENT
Start: 2018-10-28

## 2018-10-28 RX ORDER — LOSARTAN POTASSIUM AND HYDROCHLOROTHIAZIDE 25; 100 MG/1; MG/1
1 TABLET ORAL DAILY
Qty: 90 TABLET | Refills: 1 | Status: SHIPPED | OUTPATIENT
Start: 2018-10-28 | End: 2018-11-27 | Stop reason: SDUPTHER

## 2018-11-07 ENCOUNTER — OFFICE VISIT (OUTPATIENT)
Dept: OBGYN CLINIC | Facility: OTHER | Age: 53
End: 2018-11-07
Payer: COMMERCIAL

## 2018-11-07 VITALS
BODY MASS INDEX: 33.05 KG/M2 | HEART RATE: 91 BPM | HEIGHT: 72 IN | DIASTOLIC BLOOD PRESSURE: 80 MMHG | WEIGHT: 244 LBS | SYSTOLIC BLOOD PRESSURE: 145 MMHG

## 2018-11-07 DIAGNOSIS — G89.29 CHRONIC LEFT HIP PAIN: ICD-10-CM

## 2018-11-07 DIAGNOSIS — M25.552 CHRONIC LEFT HIP PAIN: ICD-10-CM

## 2018-11-07 PROCEDURE — 99243 OFF/OP CNSLTJ NEW/EST LOW 30: CPT | Performed by: ORTHOPAEDIC SURGERY

## 2018-11-07 NOTE — PROGRESS NOTES
Orthopaedic Surgery - Office Note  Ab Arango  (48 y o  male)   : 1965   MRN: [id number]  Encounter Date: 2018    Chief Complaint   Patient presents with    Left Hip - Pain       Assessment / Plan  Left lumbar muscle strain    · Activity as tolerated  · Ice and heat as well as Tylenol as needed for pain  Return if symptoms worsen or fail to improve  History of Present Illness  Ab Arango  is a 48 y o  male who presents four-month history of posterior left-sided low back pain that began without acute injury  Pain is present mostly at night when he lays on the affected side  Pain is not associated with any numbness or tingling  Pain does not radiate  Pain has been improving over time  Pain is not made worse with any particular activity or position  Review of Systems  Pertinent items are noted in HPI  All other systems were reviewed and are negative  Physical Exam  /80   Pulse 91   Ht 5' 11 5" (1 816 m)   Wt 111 kg (244 lb)   BMI 33 56 kg/m²   Cons: Appears well  No apparent distress  Psych: Alert  Oriented x3  Mood and affect normal   Eyes: PERRLA, EOMI  Resp: Normal effort  No audible wheezing or stridor  CV: Palpable pulse  No discernable arrhythmia  No LE edema  Lymph:  No palpable cervical, axillary, or inguinal lymphadenopathy  Skin: Warm  No palpable masses  No visible lesions  Neuro: Normal muscle tone  Normal and symmetric DTR's  Left Hip Exam  Alignment / Posture:  Normal lumbar alignment  Normal resting hip posture  Inspection:  No swelling  Palpation:  Tenderness over the posterior iliac spine  ROM:  Normal hip ROM  Strength:  5/5 hip flexors and abductors  Stability:  No objective hip instability  Tests:  (-) Stinchfield  (-) FADDIR  (-) YEFRI  (-) Straight leg raise  Neurovascular:  Sensation intact in DP/SP/[place]/Sa/T nerve distributions  Toes warm and perfused    Gait:  Normal       Studies Reviewed  X-rays of the left hip reviewed personally today and PACS showing mild hip DJD  no acute osseous abnormality  Procedures  No procedures today  Medical, Surgical, Family, and Social History  The patient's medical history, family history, and social history, were reviewed and updated as appropriate  Past Medical History:   Diagnosis Date    Anxiety disorder     resolved 3/25/15    H/O renal cell carcinoma     last assessed 1/10/18    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[facility])     resolved 11/29/14    Overweight     resolved 7/8/14    Solitary pulmonary nodule on lung CT     resolved 11/19/14       Past Surgical History:   Procedure Laterality Date    ANKLE FRACTURE SURGERY      closed tx of bimalleolar    COLONOSCOPY  04/23/2014    fiberoptic screening     NEPHRECTOMY      UMBILICAL HERNIA REPAIR         Family History   Problem Relation Age of Onset    Diabetes Family         mellitus    Hypertension Family     Other Family         visual impairment       Social History     Occupational History    Not on file       Social History Main Topics    Smoking status: Current Every Day Smoker    Smokeless tobacco: Never Used    Alcohol use Yes      Comment: social    Drug use: No    Sexual activity: Not on file       Allergies   Allergen Reactions    Nifedipine      Annotation - [id number]: flushing    Sulfa Antibiotics Hives         Current Outpatient Prescriptions:     amLODIPine (NORVASC) 5 mg tablet, Take 1 tablet by mouth daily, Disp: , Rfl:     fenofibrate (TRIGLIDE) 160 MG tablet, TAKE ONE TABLET BY MOUTH DAILY , Disp: 90 tablet, Rfl: 3    hydrALAZINE (APRESOLINE) 100 MG tablet, TAKE ONE TABLET BY MOUTH TWICE DAILY , Disp: 60 tablet, Rfl: 4    losartan-hydrochlorothiazide (HYZAAR) 100-25 MG per tablet, Take 1 tablet by mouth daily, Disp: 90 tablet, Rfl: 1    simvastatin (ZOCOR) 80 mg tablet, TAKE ONE TABLET BY MOUTH DAILY , Disp: 30 tablet, Rfl: 3    ergocalciferol (VITAMIN D2) 50,000 units, Take 1 capsule (50,000 Units total) by mouth once a week (Patient not taking: Reported on 11/7/2018 ), Disp: 12 capsule, Rfl: 0      Ilia Contreras    I,:    am acting as a scribe while in the presence of the attending physician :        I,:    personally performed the services described in this documentation    as scribed in my presence :

## 2018-11-26 DIAGNOSIS — I10 ESSENTIAL HYPERTENSION: ICD-10-CM

## 2018-11-26 RX ORDER — HYDRALAZINE HYDROCHLORIDE 100 MG/1
100 TABLET, FILM COATED ORAL 2 TIMES DAILY
Qty: 180 TABLET | Refills: 0 | Status: SHIPPED | OUTPATIENT
Start: 2018-11-26 | End: 2018-11-27 | Stop reason: SDUPTHER

## 2018-11-27 DIAGNOSIS — I10 ESSENTIAL HYPERTENSION: ICD-10-CM

## 2018-11-27 RX ORDER — HYDRALAZINE HYDROCHLORIDE 100 MG/1
TABLET, FILM COATED ORAL
Qty: 180 TABLET | Refills: 1 | Status: SHIPPED | OUTPATIENT
Start: 2018-11-27 | End: 2020-02-19 | Stop reason: ALTCHOICE

## 2018-11-27 RX ORDER — VALSARTAN AND HYDROCHLOROTHIAZIDE 320; 25 MG/1; MG/1
TABLET, FILM COATED ORAL
Qty: 30 TABLET | Refills: 0 | Status: CANCELLED | OUTPATIENT
Start: 2018-11-27

## 2018-11-27 RX ORDER — LOSARTAN POTASSIUM AND HYDROCHLOROTHIAZIDE 25; 100 MG/1; MG/1
1 TABLET ORAL DAILY
Qty: 90 TABLET | Refills: 1 | Status: SHIPPED | OUTPATIENT
Start: 2018-11-27 | End: 2019-02-27 | Stop reason: SINTOL

## 2018-11-28 RX ORDER — VALSARTAN AND HYDROCHLOROTHIAZIDE 320; 25 MG/1; MG/1
TABLET, FILM COATED ORAL
Qty: 30 TABLET | Refills: 0 | OUTPATIENT
Start: 2018-11-28

## 2018-12-27 DIAGNOSIS — E78.2 MIXED HYPERLIPIDEMIA: ICD-10-CM

## 2018-12-27 DIAGNOSIS — I10 ESSENTIAL HYPERTENSION: Primary | ICD-10-CM

## 2018-12-27 RX ORDER — SIMVASTATIN 80 MG
TABLET ORAL
Qty: 90 TABLET | Refills: 3 | Status: SHIPPED | OUTPATIENT
Start: 2018-12-27 | End: 2020-02-19 | Stop reason: ALTCHOICE

## 2018-12-27 RX ORDER — AMLODIPINE BESYLATE 5 MG/1
TABLET ORAL
Qty: 90 TABLET | Refills: 3 | Status: SHIPPED | OUTPATIENT
Start: 2018-12-27 | End: 2019-05-08

## 2019-01-16 ENCOUNTER — OFFICE VISIT (OUTPATIENT)
Dept: FAMILY MEDICINE CLINIC | Facility: CLINIC | Age: 54
End: 2019-01-16
Payer: COMMERCIAL

## 2019-01-16 VITALS
DIASTOLIC BLOOD PRESSURE: 78 MMHG | BODY MASS INDEX: 32.64 KG/M2 | SYSTOLIC BLOOD PRESSURE: 130 MMHG | HEIGHT: 72 IN | WEIGHT: 241 LBS

## 2019-01-16 DIAGNOSIS — R73.9 HYPERGLYCEMIA: ICD-10-CM

## 2019-01-16 DIAGNOSIS — E66.09 CLASS 1 OBESITY DUE TO EXCESS CALORIES WITH SERIOUS COMORBIDITY AND BODY MASS INDEX (BMI) OF 33.0 TO 33.9 IN ADULT: ICD-10-CM

## 2019-01-16 DIAGNOSIS — I10 ESSENTIAL HYPERTENSION: Primary | ICD-10-CM

## 2019-01-16 DIAGNOSIS — C64.9 RENAL CELL CARCINOMA, UNSPECIFIED LATERALITY (HCC): ICD-10-CM

## 2019-01-16 DIAGNOSIS — E55.9 VITAMIN D DEFICIENCY: ICD-10-CM

## 2019-01-16 DIAGNOSIS — E78.2 MIXED HYPERLIPIDEMIA: ICD-10-CM

## 2019-01-16 PROBLEM — R00.2 PALPITATIONS: Status: RESOLVED | Noted: 2018-01-10 | Resolved: 2019-01-16

## 2019-01-16 LAB
25(OH)D3 SERPL-MCNC: 20 NG/ML (ref 30–100)
ALBUMIN SERPL-MCNC: 4.6 G/DL (ref 3.6–5.1)
ALBUMIN/GLOB SERPL: 1.7 (CALC) (ref 1–2.5)
ALP SERPL-CCNC: 37 U/L (ref 40–115)
ALT SERPL-CCNC: 28 U/L (ref 9–46)
AST SERPL-CCNC: 20 U/L (ref 10–35)
BASOPHILS # BLD AUTO: 40 CELLS/UL (ref 0–200)
BASOPHILS NFR BLD AUTO: 0.5 %
BILIRUB SERPL-MCNC: 0.7 MG/DL (ref 0.2–1.2)
BUN SERPL-MCNC: 20 MG/DL (ref 7–25)
BUN/CREAT SERPL: ABNORMAL (CALC) (ref 6–22)
CALCIUM SERPL-MCNC: 9.8 MG/DL (ref 8.6–10.3)
CHLORIDE SERPL-SCNC: 103 MMOL/L (ref 98–110)
CHOLEST SERPL-MCNC: 162 MG/DL
CHOLEST/HDLC SERPL: 3.9 (CALC)
CO2 SERPL-SCNC: 23 MMOL/L (ref 20–32)
CREAT SERPL-MCNC: 1.14 MG/DL (ref 0.7–1.33)
EOSINOPHIL # BLD AUTO: 152 CELLS/UL (ref 15–500)
EOSINOPHIL NFR BLD AUTO: 1.9 %
ERYTHROCYTE [DISTWIDTH] IN BLOOD BY AUTOMATED COUNT: 12.8 % (ref 11–15)
GLOBULIN SER CALC-MCNC: 2.7 G/DL (CALC) (ref 1.9–3.7)
GLUCOSE SERPL-MCNC: 97 MG/DL (ref 65–99)
HBA1C MFR BLD: 5.5 % OF TOTAL HGB
HCT VFR BLD AUTO: 47.1 % (ref 38.5–50)
HDLC SERPL-MCNC: 42 MG/DL
HGB BLD-MCNC: 16.3 G/DL (ref 13.2–17.1)
LDLC SERPL CALC-MCNC: 99 MG/DL (CALC)
LYMPHOCYTES # BLD AUTO: 1392 CELLS/UL (ref 850–3900)
LYMPHOCYTES NFR BLD AUTO: 17.4 %
MCH RBC QN AUTO: 30.9 PG (ref 27–33)
MCHC RBC AUTO-ENTMCNC: 34.6 G/DL (ref 32–36)
MCV RBC AUTO: 89.4 FL (ref 80–100)
MONOCYTES # BLD AUTO: 680 CELLS/UL (ref 200–950)
MONOCYTES NFR BLD AUTO: 8.5 %
NEUTROPHILS # BLD AUTO: 5736 CELLS/UL (ref 1500–7800)
NEUTROPHILS NFR BLD AUTO: 71.7 %
NONHDLC SERPL-MCNC: 120 MG/DL (CALC)
PLATELET # BLD AUTO: 303 THOUSAND/UL (ref 140–400)
PMV BLD REES-ECKER: 10.4 FL (ref 7.5–12.5)
POTASSIUM SERPL-SCNC: 4.1 MMOL/L (ref 3.5–5.3)
PROT SERPL-MCNC: 7.3 G/DL (ref 6.1–8.1)
PSA SERPL-MCNC: 1 NG/ML
RBC # BLD AUTO: 5.27 MILLION/UL (ref 4.2–5.8)
SL AMB EGFR AFRICAN AMERICAN: 85 ML/MIN/1.73M2
SL AMB EGFR NON AFRICAN AMERICAN: 73 ML/MIN/1.73M2
SODIUM SERPL-SCNC: 138 MMOL/L (ref 135–146)
TRIGL SERPL-MCNC: 114 MG/DL
TSH SERPL-ACNC: 1.37 MIU/L (ref 0.4–4.5)
WBC # BLD AUTO: 8 THOUSAND/UL (ref 3.8–10.8)

## 2019-01-16 PROCEDURE — 99214 OFFICE O/P EST MOD 30 MIN: CPT | Performed by: FAMILY MEDICINE

## 2019-01-16 NOTE — PROGRESS NOTES
Assessment/Plan:    Essential hypertension  Controlled on current medications  Recheck lab in 6 months  Call the office if there is any problems  Renal cell carcinoma (HCC)  No evidence of disease  Follow up with Oncology as scheduled  He is 10 years out  Class 1 obesity due to excess calories with serious comorbidity and body mass index (BMI) of 33 0 to 33 9 in adult  Diet and exercise discussed  Hyperglycemia  A1c is 5 5, needs to feel more diligent with his diet and exercise program     Mixed hyperlipidemia  Continue current medications  Recheck in 6 months    Vitamin D deficiency  Needs to start supplementation again, at least 2000 units per day, recheck lab in 6 months  Diagnoses and all orders for this visit:    Essential hypertension  -     CBC and differential; Future  -     Comprehensive metabolic panel; Future  -     Lipid panel; Future    Renal cell carcinoma, unspecified laterality (HCC)  -     CBC and differential; Future  -     Comprehensive metabolic panel; Future    Hyperglycemia  -     Comprehensive metabolic panel; Future  -     Hemoglobin A1C; Future  -     Urinalysis with reflex to microscopic; Future    Mixed hyperlipidemia  -     CBC and differential; Future  -     Comprehensive metabolic panel; Future  -     TSH, 3rd generation; Future    Vitamin D deficiency  -     Vitamin D 25 hydroxy; Future    Class 1 obesity due to excess calories with serious comorbidity and body mass index (BMI) of 33 0 to 33 9 in adult          Subjective:   Chief Complaint   Patient presents with    Hyperlipidemia    Hypertension     no refills needed           Patient ID: Jose D Powers  is a 48 y o  male  Patient is a 69-year-old male presenting to the office for follow-up on his hypertension, hyperlipidemia, kidney cancer, weight, and general health  Readily admits he he has not been exercising, always gets worse in the winter    Stressed to him once again the need for having an exercise program that he can follow your around  Does have exercise equipment in the house        The following portions of the patient's history were reviewed and updated as appropriate: allergies, current medications, past family history, past medical history, past social history, past surgical history and problem list     Review of Systems   Constitutional: Positive for unexpected weight change  Negative for appetite change, chills, diaphoresis, fatigue and fever  HENT: Negative for congestion, ear pain, hearing loss, nosebleeds, postnasal drip, rhinorrhea, sinus pressure, sore throat, tinnitus and trouble swallowing  Eyes: Negative for photophobia, pain, discharge, redness, itching and visual disturbance  Respiratory: Negative for cough, chest tightness, shortness of breath and wheezing  Cardiovascular: Negative for chest pain, palpitations and leg swelling  Denies orthopnea , dyspnea on exertion   Gastrointestinal: Negative for abdominal distention, abdominal pain, blood in stool, constipation, diarrhea, nausea and vomiting  Endocrine: Negative  Genitourinary: Negative for difficulty urinating, dysuria, flank pain, frequency, hematuria and urgency  Denies nocturia , erectile dysfunction   Musculoskeletal: Negative for arthralgias, back pain, gait problem, joint swelling and myalgias  Skin: Negative for pallor, rash and wound  Denies skin lesions   Allergic/Immunologic: Negative for environmental allergies, food allergies and immunocompromised state  Neurological: Negative for dizziness, tremors, seizures, syncope, speech difficulty, weakness, numbness and headaches  Hematological: Negative for adenopathy  Does not bruise/bleed easily  Psychiatric/Behavioral: Negative for behavioral problems, confusion, sleep disturbance and suicidal ideas  The patient is not nervous/anxious            Objective:      /78   Ht 5' 11 5" (1 816 m)   Wt 109 kg (241 lb)   BMI 33 14 kg/m²          Physical Exam   Constitutional: He is oriented to person, place, and time  He appears well-developed and well-nourished  Obese   HENT:   Head: Normocephalic and atraumatic  Nose: Nose normal    Mouth/Throat: Oropharynx is clear and moist    Eyes: Pupils are equal, round, and reactive to light  Conjunctivae and EOM are normal    Neck: Normal range of motion  Neck supple  No JVD present  No tracheal deviation present  No thyromegaly present  Cardiovascular: Normal rate, regular rhythm and intact distal pulses  No murmur heard  Pulmonary/Chest: Effort normal and breath sounds normal  He has no wheezes  He has no rales  Abdominal: Soft  Bowel sounds are normal  He exhibits no mass  There is no tenderness  There is no rebound and no guarding  Musculoskeletal: He exhibits no edema, tenderness or deformity  Lymphadenopathy:     He has no cervical adenopathy  Neurological: He is alert and oriented to person, place, and time  He has normal reflexes  No cranial nerve deficit  He exhibits normal muscle tone  Coordination normal    Skin: Skin is warm and dry  No lesion and no rash noted  Nails show no clubbing  Psychiatric: He has a normal mood and affect  Judgment normal      BMI Counseling: Body mass index is 33 14 kg/m²  Discussed the patient's BMI with him  The BMI is above average  BMI counseling and education was provided to the patient  Nutrition recommendations include reducing portion sizes, decreasing overall calorie intake, 3-5 servings of fruits/vegetables daily, reducing fast food intake, consuming healthier snacks, decreasing soda and/or juice intake, moderation in carbohydrate intake, increasing intake of lean protein, reducing intake of saturated fat and trans fat and reducing intake of cholesterol  Exercise recommendations include moderate aerobic physical activity for 150 minutes/week and exercising 3-5 times per week

## 2019-01-16 NOTE — ASSESSMENT & PLAN NOTE
Controlled on current medications  Recheck lab in 6 months  Call the office if there is any problems

## 2019-02-27 ENCOUNTER — APPOINTMENT (OUTPATIENT)
Dept: RADIOLOGY | Facility: MEDICAL CENTER | Age: 54
End: 2019-02-27
Payer: COMMERCIAL

## 2019-02-27 ENCOUNTER — OFFICE VISIT (OUTPATIENT)
Dept: FAMILY MEDICINE CLINIC | Facility: CLINIC | Age: 54
End: 2019-02-27
Payer: COMMERCIAL

## 2019-02-27 ENCOUNTER — TELEPHONE (OUTPATIENT)
Dept: FAMILY MEDICINE CLINIC | Facility: CLINIC | Age: 54
End: 2019-02-27

## 2019-02-27 VITALS
HEIGHT: 72 IN | SYSTOLIC BLOOD PRESSURE: 130 MMHG | DIASTOLIC BLOOD PRESSURE: 82 MMHG | BODY MASS INDEX: 31.17 KG/M2 | WEIGHT: 230.1 LBS

## 2019-02-27 DIAGNOSIS — M25.552 LEFT HIP PAIN: ICD-10-CM

## 2019-02-27 DIAGNOSIS — C64.9 RENAL CELL CARCINOMA, UNSPECIFIED LATERALITY (HCC): ICD-10-CM

## 2019-02-27 DIAGNOSIS — M54.6 ACUTE LEFT-SIDED THORACIC BACK PAIN: Primary | ICD-10-CM

## 2019-02-27 DIAGNOSIS — I10 ESSENTIAL HYPERTENSION: Primary | ICD-10-CM

## 2019-02-27 DIAGNOSIS — I10 ESSENTIAL HYPERTENSION: ICD-10-CM

## 2019-02-27 DIAGNOSIS — M54.6 ACUTE LEFT-SIDED THORACIC BACK PAIN: ICD-10-CM

## 2019-02-27 PROCEDURE — 72072 X-RAY EXAM THORAC SPINE 3VWS: CPT

## 2019-02-27 PROCEDURE — 3079F DIAST BP 80-89 MM HG: CPT | Performed by: FAMILY MEDICINE

## 2019-02-27 PROCEDURE — 3075F SYST BP GE 130 - 139MM HG: CPT | Performed by: FAMILY MEDICINE

## 2019-02-27 PROCEDURE — 99214 OFFICE O/P EST MOD 30 MIN: CPT | Performed by: FAMILY MEDICINE

## 2019-02-27 RX ORDER — LISINOPRIL AND HYDROCHLOROTHIAZIDE 25; 20 MG/1; MG/1
1 TABLET ORAL DAILY
Qty: 90 TABLET | Refills: 1 | Status: SHIPPED | OUTPATIENT
Start: 2019-02-27 | End: 2019-05-08

## 2019-02-27 RX ORDER — IRBESARTAN AND HYDROCHLOROTHIAZIDE 150; 12.5 MG/1; MG/1
1 TABLET, FILM COATED ORAL DAILY
Qty: 90 TABLET | Refills: 1 | Status: SHIPPED | OUTPATIENT
Start: 2019-02-27 | End: 2019-02-27

## 2019-02-27 RX ORDER — BACLOFEN 10 MG/1
10 TABLET ORAL 3 TIMES DAILY
Qty: 30 TABLET | Refills: 0 | Status: SHIPPED | OUTPATIENT
Start: 2019-02-27 | End: 2020-02-19 | Stop reason: ALTCHOICE

## 2019-02-27 NOTE — ASSESSMENT & PLAN NOTE
Will check x-ray to make sure this is not some type of metastatic lesion, in the meantime follow-up with Oncology as scheduled

## 2019-02-27 NOTE — PROGRESS NOTES
Assessment/Plan:    Left hip pain  Ongoing, no relief  Will refer for physical therapy  Renal cell carcinoma ([facility] )  Will check x-ray to make sure this is not some type of metastatic lesion, in the meantime follow-up with Oncology as scheduled       Diagnoses and all orders for this visit:    Acute left-sided thoracic back pain  Comments:  Check an x-ray, muscle relaxer, recheck in 2 weeks  Orders:  -     baclofen 10 mg tablet; Take 1 tablet (10 mg total) by mouth 3 (three) times a day  -     Cancel: Ambulatory referral to Physical Therapy; Future  -     XR spine thoracic 3 vw; Future  -     Ambulatory referral to Physical Therapy; Future    Left hip pain  -     baclofen 10 mg tablet; Take 1 tablet (10 mg total) by mouth 3 (three) times a day  -     Ambulatory referral to Physical Therapy; Future    Renal cell carcinoma, unspecified laterality ([facility] )    Essential hypertension  -     irbesartan-hydrochlorothiazide (AVALIDE) 150-12 5 MG per tablet; Take 1 tablet by mouth daily Discontinue losartan-HCTZ          Subjective:   Chief Complaint   Patient presents with    Back Pain          Patient ID: Ab Arango  is a 48 y o  male  Patient is a 29-year-old male presenting to the office complaining of left-sided thoracic pain  Had 2 episodes of sharp pain that lasted over an hour in the last couple of weeks  Denies any recent trauma  No overuse or change in exercise protocol  Does have a history of renal cell carcinoma, denies hematuria or prior kidney stones  Tylenol does help a little bit  Also complaining of some sleep issues, ongoing pain in his left hip  He saw Orthopedics and Physical therapy was recommended but he never followed through  Lastly, his blood pressure medication has been recalled I need to discuss options with him        The following portions of the patient's history were reviewed and updated as appropriate: allergies, current medications, past family history, past medical history, past social history, past surgical history and problem list     Review of Systems   Constitutional: Negative for appetite change, chills, diaphoresis, fatigue, fever and unexpected weight change  HENT: Negative for congestion, ear pain, hearing loss, nosebleeds, postnasal drip, rhinorrhea, sinus pressure, sore throat, tinnitus and trouble swallowing  Eyes: Negative for photophobia, pain, discharge, redness, itching and visual disturbance  Respiratory: Negative for cough, chest tightness, shortness of breath and wheezing  Cardiovascular: Negative for chest pain, palpitations and leg swelling  Denies orthopnea , dyspnea on exertion   Gastrointestinal: Negative for abdominal distention, abdominal pain, blood in stool, constipation, diarrhea, nausea and vomiting  Endocrine: Negative  Genitourinary: Negative for difficulty urinating, dysuria, flank pain, frequency, hematuria and urgency  Denies nocturia , erectile dysfunction   Musculoskeletal: Positive for arthralgias and back pain  Negative for gait problem, joint swelling and myalgias  Skin: Negative for pallor, rash and wound  Denies skin lesions   Allergic/Immunologic: Negative for environmental allergies, food allergies and immunocompromised state  Neurological: Negative for dizziness, tremors, seizures, syncope, speech difficulty, weakness, numbness and headaches  Hematological: Negative for adenopathy  Does not bruise/bleed easily  Psychiatric/Behavioral: Negative for behavioral problems, confusion, sleep disturbance and suicidal ideas  The patient is not nervous/anxious  Objective:      /82   Ht 5' 11 5" (1 816 m)   Wt 104 kg (230 lb 1 6 oz)   BMI 31 65 kg/m²          Physical Exam   Constitutional: He is oriented to person, place, and time  He appears well-developed and well-nourished  HENT:   Head: Normocephalic and atraumatic     Nose: Nose normal    Mouth/Throat: Oropharynx is clear and moist    Eyes: Pupils are equal, round, and reactive to light  Conjunctivae and EOM are normal    Neck: Normal range of motion  Neck supple  No JVD present  No tracheal deviation present  No thyromegaly present  Cardiovascular: Normal rate, regular rhythm and intact distal pulses  No murmur heard  Pulmonary/Chest: Effort normal and breath sounds normal  He has no wheezes  He has no rales  Abdominal: Soft  Bowel sounds are normal  He exhibits no mass  There is no tenderness  There is no rebound and no guarding  Musculoskeletal: He exhibits no edema or deformity  Left hip: He exhibits tenderness  He exhibits normal range of motion, normal strength, no bony tenderness, no swelling and no crepitus  Thoracic back: He exhibits tenderness and spasm  He exhibits no bony tenderness  Back:    Lymphadenopathy:     He has no cervical adenopathy  Neurological: He is alert and oriented to person, place, and time  He has normal reflexes  He displays normal reflexes  No cranial nerve deficit  He exhibits normal muscle tone  Coordination normal    Skin: Skin is warm and dry  No lesion and no rash noted  Nails show no clubbing  Psychiatric: He has a normal mood and affect   Judgment normal

## 2019-02-27 NOTE — TELEPHONE ENCOUNTER
UNFORTUNATELY NO THEY HAVE NO ARBS AT ALL  THEY CALLED AROUND TOO BUT NOTHING IS IN STOCK,  THE ONLY COMPARABLE THING IS THE LISINOPRIL/HCTZ  SHE SAID WE CAN TRY BACK IN A FEW WEEKS OR WHEN HE IS CLOSER TO NEEDING THEM

## 2019-02-27 NOTE — TELEPHONE ENCOUNTER
Do they have any ARB/hctz combo in stock, or do they expect to ge any in the near future, pt still has some of his old bp medication

## 2019-03-13 ENCOUNTER — OFFICE VISIT (OUTPATIENT)
Dept: FAMILY MEDICINE CLINIC | Facility: CLINIC | Age: 54
End: 2019-03-13
Payer: COMMERCIAL

## 2019-03-13 VITALS
WEIGHT: 222 LBS | SYSTOLIC BLOOD PRESSURE: 122 MMHG | BODY MASS INDEX: 30.07 KG/M2 | DIASTOLIC BLOOD PRESSURE: 80 MMHG | HEIGHT: 72 IN

## 2019-03-13 DIAGNOSIS — C64.9 RENAL CELL CARCINOMA, UNSPECIFIED LATERALITY (HCC): ICD-10-CM

## 2019-03-13 DIAGNOSIS — G89.29 CHRONIC LEFT-SIDED LOW BACK PAIN WITHOUT SCIATICA: ICD-10-CM

## 2019-03-13 DIAGNOSIS — R63.4 WEIGHT LOSS, ABNORMAL: ICD-10-CM

## 2019-03-13 DIAGNOSIS — M25.552 LEFT HIP PAIN: Primary | ICD-10-CM

## 2019-03-13 DIAGNOSIS — R63.4 WEIGHT LOSS, ABNORMAL: Primary | ICD-10-CM

## 2019-03-13 DIAGNOSIS — M54.50 CHRONIC LEFT-SIDED LOW BACK PAIN WITHOUT SCIATICA: ICD-10-CM

## 2019-03-13 PROCEDURE — 99214 OFFICE O/P EST MOD 30 MIN: CPT | Performed by: FAMILY MEDICINE

## 2019-03-13 PROCEDURE — 3008F BODY MASS INDEX DOCD: CPT | Performed by: FAMILY MEDICINE

## 2019-03-13 NOTE — PROGRESS NOTES
Assessment/Plan:    Left hip pain  Patient is a follow-up with physical therapy  Take medication 3 times daily for at least a week  Chronic left-sided low back pain without sciatica  Follow-up with physical therapy  Take medication 3 times daily for a week  Renal cell carcinoma ([facility] [address] )  Patient is 10 years out from his original diagnosis  Was scheduled to have a chest x-ray this year as a screening test in July  Under the circumstances, I will double check with his oncologist in see if additional testing is warranted  Diagnoses and all orders for this visit:    Left hip pain    Chronic left-sided low back pain without sciatica    Weight loss, abnormal    Renal cell carcinoma, unspecified laterality (HCC)          Subjective:   Chief Complaint   Patient presents with    Follow-up     back pain           Patient ID: Kerry Franklin  is a 48 y o  male  Patient is a 59-year-old male presenting to the office for follow-up on his low back and hip pain  Medicine helps a little bit, but he does not take it consistently  He has not started physical therapy yet as a keep changing his appointment  He is also complaining of decreased appetite and weight loss  Reviewing the chart, he has lost over 30 lb over the last several months  This is not by design  Patient reports he has had decreased appetite, decreased energy levels, and just not feeling right  He is 10 years out from his original diagnosis of metastatic renal cell carcinoma  He had been getting CT of the chest abdomen pelvis on a yearly basis, last in July of 2018  This showed no evidence of disease  He is scheduled for a chest x-ray in July        The following portions of the patient's history were reviewed and updated as appropriate: allergies, current medications, past family history, past medical history, past social history, past surgical history and problem list     Review of Systems   Constitutional: Positive for activity change, appetite change, fatigue and unexpected weight change  Negative for fever  HENT: Positive for congestion and rhinorrhea  Negative for sinus pressure and sinus pain  He has had cold symptoms for 2-3 days  Eyes: Negative for pain, itching and visual disturbance  Respiratory: Positive for cough  Negative for shortness of breath, wheezing and stridor  Cardiovascular: Negative  Endocrine: Negative for cold intolerance and heat intolerance  Genitourinary: Negative  Musculoskeletal: Positive for arthralgias, back pain, myalgias and neck stiffness  Negative for joint swelling  Skin: Negative for color change and rash  Allergic/Immunologic: Negative for immunocompromised state  Neurological: Negative for dizziness, speech difficulty and numbness  Hematological: Negative for adenopathy  Does not bruise/bleed easily  Psychiatric/Behavioral: Negative for decreased concentration and dysphoric mood  The patient is nervous/anxious  Objective:      /80   Ht 5' 11 5" (1 816 m)   Wt 101 kg (222 lb)   BMI 30 53 kg/m²          Physical Exam   Constitutional: He is oriented to person, place, and time  He appears well-developed and well-nourished  HENT:   Head: Normocephalic and atraumatic  Nose: Nose normal    Mouth/Throat: Oropharynx is clear and moist    Eyes: Pupils are equal, round, and reactive to light  Conjunctivae and EOM are normal    Neck: Normal range of motion  Neck supple  No JVD present  No tracheal deviation present  No thyromegaly present  Cardiovascular: Normal rate, regular rhythm and intact distal pulses  No murmur heard  Pulmonary/Chest: Effort normal and breath sounds normal  He has no wheezes  He has no rales  Abdominal: Soft  Bowel sounds are normal  He exhibits no mass  There is no tenderness  There is no rebound and no guarding  Musculoskeletal: He exhibits no edema or deformity          Left hip: He exhibits decreased range of motion, decreased strength and tenderness  Lumbar back: He exhibits decreased range of motion and tenderness  He exhibits no deformity and no spasm  Lymphadenopathy:     He has no cervical adenopathy  Neurological: He is alert and oriented to person, place, and time  He has normal reflexes  He displays normal reflexes  No cranial nerve deficit  He exhibits normal muscle tone  Coordination normal    Skin: Skin is warm and dry  No lesion and no rash noted  Nails show no clubbing  Psychiatric: He has a normal mood and affect   Judgment normal

## 2019-03-13 NOTE — ASSESSMENT & PLAN NOTE
Patient is 10 years out from his original diagnosis  Was scheduled to have a chest x-ray this year as a screening test in July  Under the circumstances, I will double check with his oncologist in see if additional testing is warranted

## 2019-03-20 ENCOUNTER — EVALUATION (OUTPATIENT)
Dept: PHYSICAL THERAPY | Facility: CLINIC | Age: 54
End: 2019-03-20
Payer: COMMERCIAL

## 2019-03-20 DIAGNOSIS — M25.552 LEFT HIP PAIN: ICD-10-CM

## 2019-03-20 DIAGNOSIS — M54.50 CHRONIC LEFT-SIDED LOW BACK PAIN WITHOUT SCIATICA: Primary | ICD-10-CM

## 2019-03-20 DIAGNOSIS — G89.29 CHRONIC LEFT-SIDED LOW BACK PAIN WITHOUT SCIATICA: Primary | ICD-10-CM

## 2019-03-20 PROCEDURE — 97162 PT EVAL MOD COMPLEX 30 MIN: CPT | Performed by: PHYSICAL THERAPIST

## 2019-03-20 NOTE — PROGRESS NOTES
PT Evaluation     Today's date: 3/20/2019  Patient name: Avi Peña  : 1965  MRN: [id number]  Referring provider: Claudy Love DO  Dx:   Encounter Diagnosis     ICD-10-CM    1  Chronic left-sided low back pain without sciatica M54 5     G89 29    2  Left hip pain M25 552 Ambulatory referral to Physical Therapy                  Assessment  Assessment details: Pt is a 48year old male presenting to PT with MD diagnosis of chronic left sided low back pain  He presents with main complaints of left hip and left sided low back pain, decreased lumbar range of motion, decreased lower extremity functional strength, and decreased tolerance to activity  Patient would benefit from skilled PT services to address these issues and to maximize function  Thank you for the referral      Impairments: abnormal or restricted ROM, abnormal movement, activity intolerance, impaired physical strength and pain with function  Barriers to therapy: Patient only available 1x/week due to work schedule    Evaluation limited this session due to patient time constraints, will finish remainder of evaluation at next session  Understanding of Dx/Px/POC: good   Prognosis: good    Goals  STG  1  Decrease pain 20-50% in 6 weeks  2  Increase strength 1/2 grade in 6 weeks  3  Soft tissue inflammation or restriction is reduced by 50% in 6 weeks    LTG  1  IADL performance in related activities is improved to maximal level of function  2  Patient is independent with HEP  3   Decreased pain when first waking in morning    Plan  Patient would benefit from: skilled physical therapy  Other planned modality interventions: prn  Planned therapy interventions: joint mobilization, manual therapy, neuromuscular re-education, therapeutic exercise and home exercise program  Frequency: 1x week  Duration in weeks: 12  Treatment plan discussed with: patient        Subjective Evaluation    History of Present Illness  Mechanism of injury: Pt is a 48 year old male presenting to PT with MD diagnosis of chronic left sided low back pain  He reports he has had low back and left hip pain for the last 9 months (last summer) of unknown onset  He reports gradual worsening of pain over the last few months, consulted with orthopedic at that time  Patient states x-rays revealing mild arthritis  He reports anterior left groin pain and lateral left hip pain, reports pain is worst in the morning  States he will occasionally have to squat in shower to alleviate pain  Reports inactivity bothers his hip  Pt states pain decreases with walking/work-related activity  Pt was prescribed baclofen, reports this has not help but he has not taken it consistently  Reports some tingling present in left hip, none present down left lower extremity  Denies weakness in legs  Denies change in bowel/bladder  Pt works as a  at an [employer]  Pt has follow-up with MD regularly  Per last MD note, he is also complaining of decreased appetite and weight loss  the chart, he has lost over 30 lb over the last several months  He is 10 years out from his original diagnosis of metastatic renal cell carcinoma  He had been getting CT of the chest abdomen pelvis on a yearly basis, last in 2018  This showed no evidence of disease  He is scheduled for a chest x-ray in July   Per PCP note, plans to consult with his oncologist in see if additional testing is warranted  Per patient, CAT scan was scheduled for next week  Pain  Current pain ratin  At best pain ratin  At worst pain ratin  Quality: sharp  Alleviating factors: icy hot      Social Support    Employment status: working  Hand dominance: right      Diagnostic Tests  X-ray: normal  Treatments  No previous or current treatments  Patient Goals  Patient goals for therapy: decreased pain, increased motion, increased strength, independence with ADLs/IADLs and return to sport/leisure activities          Objective     General Comments:      Lumbar Comments  Lumbar AROM: 50% flexion, 75% ext, 50% lateral flexion, pain on left with flexion to right  Tenderness to palpation L PSIS and L5 lumbar paraspinals  (-) slump (-) SLR   DL squat: un-weighting of L LE  SL squat: bilateral hip collapse, no pain reported with activity  Significant hamstring tightness on left  Significant tenderness to palpation left greater trochanter and gluteal musculature  Hip abduction: 3p/5 on left    *patient time constraints, limiting objective portion of evaluation today, will finish evaluation at next scheduled appointment  *           Precautions: h/o renal CA 10 years ago    Daily Treatment Diary     Manual  3/20            Assess psoas NV    Assess prone hip mobility NV                                                                     Exercise Diary  3/20            Standing piriformis stretch NV            sidelying lumbar mobilization stretch NV            Progress POC after NV for additional evaluation             Consider functional hip/core strengthening, hamstring stretches with adduction, lumbopelvic mobility                                                                                                                                                                                                                                 Modalities  3/20            prn

## 2019-03-29 ENCOUNTER — HOSPITAL ENCOUNTER (OUTPATIENT)
Dept: CT IMAGING | Facility: HOSPITAL | Age: 54
Discharge: HOME | End: 2019-03-29
Payer: COMMERCIAL

## 2019-03-29 DIAGNOSIS — C64.9 RENAL CELL CARCINOMA, UNSPECIFIED LATERALITY (HCC): ICD-10-CM

## 2019-03-29 DIAGNOSIS — R63.4 WEIGHT LOSS, ABNORMAL: ICD-10-CM

## 2019-03-29 DIAGNOSIS — C64.9 RENAL CELL CARCINOMA, UNSPECIFIED LATERALITY (HCC): Primary | ICD-10-CM

## 2019-03-29 PROCEDURE — 71260 CT THORAX DX C+: CPT

## 2019-03-29 PROCEDURE — 74177 CT ABD & PELVIS W/CONTRAST: CPT

## 2019-03-29 RX ADMIN — IOHEXOL 100 ML: 350 INJECTION, SOLUTION INTRAVENOUS at 18:18

## 2019-04-03 ENCOUNTER — TELEPHONE (OUTPATIENT)
Dept: HEMATOLOGY ONCOLOGY | Facility: CLINIC | Age: 54
End: 2019-04-03

## 2019-04-03 ENCOUNTER — OFFICE VISIT (OUTPATIENT)
Dept: PHYSICAL THERAPY | Facility: CLINIC | Age: 54
End: 2019-04-03
Payer: COMMERCIAL

## 2019-04-03 DIAGNOSIS — M25.552 LEFT HIP PAIN: ICD-10-CM

## 2019-04-03 DIAGNOSIS — G89.29 CHRONIC LEFT-SIDED LOW BACK PAIN WITHOUT SCIATICA: Primary | ICD-10-CM

## 2019-04-03 DIAGNOSIS — M54.50 CHRONIC LEFT-SIDED LOW BACK PAIN WITHOUT SCIATICA: Primary | ICD-10-CM

## 2019-04-03 PROCEDURE — 97110 THERAPEUTIC EXERCISES: CPT | Performed by: PHYSICAL THERAPIST

## 2019-04-03 PROCEDURE — 97140 MANUAL THERAPY 1/> REGIONS: CPT | Performed by: PHYSICAL THERAPIST

## 2019-04-04 ENCOUNTER — TELEPHONE (OUTPATIENT)
Dept: HEMATOLOGY ONCOLOGY | Facility: CLINIC | Age: 54
End: 2019-04-04

## 2019-04-10 ENCOUNTER — OFFICE VISIT (OUTPATIENT)
Dept: PHYSICAL THERAPY | Facility: CLINIC | Age: 54
End: 2019-04-10
Payer: COMMERCIAL

## 2019-04-10 DIAGNOSIS — G89.29 CHRONIC LEFT-SIDED LOW BACK PAIN WITHOUT SCIATICA: Primary | ICD-10-CM

## 2019-04-10 DIAGNOSIS — M54.50 CHRONIC LEFT-SIDED LOW BACK PAIN WITHOUT SCIATICA: Primary | ICD-10-CM

## 2019-04-10 DIAGNOSIS — M25.552 LEFT HIP PAIN: ICD-10-CM

## 2019-04-10 PROCEDURE — 97140 MANUAL THERAPY 1/> REGIONS: CPT | Performed by: PHYSICAL THERAPIST

## 2019-04-10 PROCEDURE — 97110 THERAPEUTIC EXERCISES: CPT | Performed by: PHYSICAL THERAPIST

## 2019-04-11 ENCOUNTER — OFFICE VISIT (OUTPATIENT)
Dept: HEMATOLOGY ONCOLOGY | Facility: CLINIC | Age: 54
End: 2019-04-11
Payer: COMMERCIAL

## 2019-04-11 VITALS
TEMPERATURE: 98.4 F | HEIGHT: 72 IN | WEIGHT: 222 LBS | SYSTOLIC BLOOD PRESSURE: 138 MMHG | BODY MASS INDEX: 30.07 KG/M2 | DIASTOLIC BLOOD PRESSURE: 78 MMHG | HEART RATE: 80 BPM | RESPIRATION RATE: 14 BRPM

## 2019-04-11 DIAGNOSIS — R19.00 RETROPERITONEAL MASS: Primary | ICD-10-CM

## 2019-04-11 PROCEDURE — 99214 OFFICE O/P EST MOD 30 MIN: CPT | Performed by: PHYSICIAN ASSISTANT

## 2019-04-12 ENCOUNTER — TELEPHONE (OUTPATIENT)
Dept: UROLOGY | Facility: AMBULATORY SURGERY CENTER | Age: 54
End: 2019-04-12

## 2019-04-12 ENCOUNTER — OFFICE VISIT (OUTPATIENT)
Dept: UROLOGY | Facility: AMBULATORY SURGERY CENTER | Age: 54
End: 2019-04-12
Payer: COMMERCIAL

## 2019-04-12 VITALS
SYSTOLIC BLOOD PRESSURE: 134 MMHG | HEART RATE: 80 BPM | DIASTOLIC BLOOD PRESSURE: 72 MMHG | BODY MASS INDEX: 28.74 KG/M2 | WEIGHT: 212.2 LBS | HEIGHT: 72 IN

## 2019-04-12 DIAGNOSIS — C64.2 MALIGNANT NEOPLASM OF LEFT KIDNEY (HCC): Primary | ICD-10-CM

## 2019-04-12 PROCEDURE — 99245 OFF/OP CONSLTJ NEW/EST HI 55: CPT | Performed by: UROLOGY

## 2019-04-12 RX ORDER — LOSARTAN POTASSIUM AND HYDROCHLOROTHIAZIDE 25; 100 MG/1; MG/1
TABLET ORAL
COMMUNITY
Start: 2019-03-24 | End: 2019-05-08

## 2019-04-15 ENCOUNTER — TELEPHONE (OUTPATIENT)
Dept: UROLOGY | Facility: AMBULATORY SURGERY CENTER | Age: 54
End: 2019-04-15

## 2019-04-15 ENCOUNTER — TELEPHONE (OUTPATIENT)
Dept: INTERVENTIONAL RADIOLOGY/VASCULAR | Facility: HOSPITAL | Age: 54
End: 2019-04-15

## 2019-04-15 RX ORDER — SODIUM CHLORIDE 9 MG/ML
75 INJECTION, SOLUTION INTRAVENOUS CONTINUOUS
Status: CANCELLED | OUTPATIENT
Start: 2019-04-15

## 2019-04-16 ENCOUNTER — TELEPHONE (OUTPATIENT)
Dept: SURGERY | Facility: HOSPITAL | Age: 54
End: 2019-04-16

## 2019-04-17 ENCOUNTER — HOSPITAL ENCOUNTER (OUTPATIENT)
Dept: CT IMAGING | Facility: HOSPITAL | Age: 54
Discharge: HOME | End: 2019-04-17
Admitting: RADIOLOGY
Payer: COMMERCIAL

## 2019-04-17 ENCOUNTER — APPOINTMENT (OUTPATIENT)
Dept: PHYSICAL THERAPY | Facility: CLINIC | Age: 54
End: 2019-04-17
Payer: COMMERCIAL

## 2019-04-17 VITALS
BODY MASS INDEX: 29.04 KG/M2 | HEIGHT: 71 IN | DIASTOLIC BLOOD PRESSURE: 64 MMHG | HEART RATE: 90 BPM | RESPIRATION RATE: 18 BRPM | OXYGEN SATURATION: 96 % | SYSTOLIC BLOOD PRESSURE: 119 MMHG | TEMPERATURE: 98.5 F | WEIGHT: 207.45 LBS

## 2019-04-17 DIAGNOSIS — R19.00 RETROPERITONEAL MASS: ICD-10-CM

## 2019-04-17 LAB
ANION GAP SERPL CALCULATED.3IONS-SCNC: 12 MMOL/L (ref 4–13)
BUN SERPL-MCNC: 21 MG/DL (ref 5–25)
CALCIUM SERPL-MCNC: 9.5 MG/DL (ref 8.3–10.1)
CHLORIDE SERPL-SCNC: 102 MMOL/L (ref 100–108)
CO2 SERPL-SCNC: 25 MMOL/L (ref 21–32)
CREAT SERPL-MCNC: 1.02 MG/DL (ref 0.6–1.3)
ERYTHROCYTE [DISTWIDTH] IN BLOOD BY AUTOMATED COUNT: 12.8 % (ref 11.6–15.1)
GFR SERPL CREATININE-BSD FRML MDRD: 84 ML/MIN/1.73SQ M
GLUCOSE P FAST SERPL-MCNC: 107 MG/DL (ref 65–99)
GLUCOSE SERPL-MCNC: 107 MG/DL (ref 65–140)
HCT VFR BLD AUTO: 41.8 % (ref 36.5–49.3)
HGB BLD-MCNC: 14.8 G/DL (ref 12–17)
INR PPP: 0.95 (ref 0.86–1.17)
MCH RBC QN AUTO: 30.6 PG (ref 26.8–34.3)
MCHC RBC AUTO-ENTMCNC: 35.4 G/DL (ref 31.4–37.4)
MCV RBC AUTO: 86 FL (ref 82–98)
PLATELET # BLD AUTO: 258 THOUSANDS/UL (ref 149–390)
PMV BLD AUTO: 10.1 FL (ref 8.9–12.7)
POTASSIUM SERPL-SCNC: 3.4 MMOL/L (ref 3.5–5.3)
PROTHROMBIN TIME: 12.6 SECONDS (ref 11.8–14.2)
RBC # BLD AUTO: 4.84 MILLION/UL (ref 3.88–5.62)
SODIUM SERPL-SCNC: 139 MMOL/L (ref 136–145)
WBC # BLD AUTO: 7.98 THOUSAND/UL (ref 4.31–10.16)

## 2019-04-17 PROCEDURE — 99152 MOD SED SAME PHYS/QHP 5/>YRS: CPT | Performed by: RADIOLOGY

## 2019-04-17 PROCEDURE — 85610 PROTHROMBIN TIME: CPT | Performed by: RADIOLOGY

## 2019-04-17 PROCEDURE — 80048 BASIC METABOLIC PNL TOTAL CA: CPT | Performed by: RADIOLOGY

## 2019-04-17 PROCEDURE — 99152 MOD SED SAME PHYS/QHP 5/>YRS: CPT

## 2019-04-17 PROCEDURE — NC001 PR NO CHARGE: Performed by: RADIOLOGY

## 2019-04-17 PROCEDURE — 88305 TISSUE EXAM BY PATHOLOGIST: CPT | Performed by: PATHOLOGY

## 2019-04-17 PROCEDURE — 88341 IMHCHEM/IMCYTCHM EA ADD ANTB: CPT

## 2019-04-17 PROCEDURE — 88333 PATH CONSLTJ SURG CYTO XM 1: CPT | Performed by: PATHOLOGY

## 2019-04-17 PROCEDURE — 88341 IMHCHEM/IMCYTCHM EA ADD ANTB: CPT | Performed by: PATHOLOGY

## 2019-04-17 PROCEDURE — 85027 COMPLETE CBC AUTOMATED: CPT | Performed by: RADIOLOGY

## 2019-04-17 PROCEDURE — 88342 IMHCHEM/IMCYTCHM 1ST ANTB: CPT | Performed by: PATHOLOGY

## 2019-04-17 PROCEDURE — 77012 CT SCAN FOR NEEDLE BIOPSY: CPT

## 2019-04-17 PROCEDURE — 49180 BIOPSY ABDOMINAL MASS: CPT | Performed by: RADIOLOGY

## 2019-04-17 PROCEDURE — 88342 IMHCHEM/IMCYTCHM 1ST ANTB: CPT

## 2019-04-17 PROCEDURE — 99153 MOD SED SAME PHYS/QHP EA: CPT

## 2019-04-17 PROCEDURE — 49180 BIOPSY ABDOMINAL MASS: CPT

## 2019-04-17 PROCEDURE — 77012 CT SCAN FOR NEEDLE BIOPSY: CPT | Performed by: RADIOLOGY

## 2019-04-17 RX ORDER — MIDAZOLAM HYDROCHLORIDE 1 MG/ML
INJECTION INTRAMUSCULAR; INTRAVENOUS CODE/TRAUMA/SEDATION MEDICATION
Status: COMPLETED | OUTPATIENT
Start: 2019-04-17 | End: 2019-04-17

## 2019-04-17 RX ORDER — SODIUM CHLORIDE 9 MG/ML
75 INJECTION, SOLUTION INTRAVENOUS CONTINUOUS
Status: DISCONTINUED | OUTPATIENT
Start: 2019-04-17 | End: 2019-04-21 | Stop reason: HOSPADM

## 2019-04-17 RX ORDER — FENTANYL CITRATE 50 UG/ML
INJECTION, SOLUTION INTRAMUSCULAR; INTRAVENOUS CODE/TRAUMA/SEDATION MEDICATION
Status: COMPLETED | OUTPATIENT
Start: 2019-04-17 | End: 2019-04-17

## 2019-04-17 RX ORDER — LIDOCAINE HYDROCHLORIDE 10 MG/ML
INJECTION, SOLUTION INFILTRATION; PERINEURAL CODE/TRAUMA/SEDATION MEDICATION
Status: COMPLETED | OUTPATIENT
Start: 2019-04-17 | End: 2019-04-17

## 2019-04-17 RX ADMIN — LIDOCAINE HYDROCHLORIDE 4 ML: 10 INJECTION, SOLUTION INFILTRATION; PERINEURAL at 09:33

## 2019-04-17 RX ADMIN — MIDAZOLAM HYDROCHLORIDE 1 MG: 1 INJECTION, SOLUTION INTRAMUSCULAR; INTRAVENOUS at 09:16

## 2019-04-17 RX ADMIN — MIDAZOLAM HYDROCHLORIDE 1 MG: 1 INJECTION, SOLUTION INTRAMUSCULAR; INTRAVENOUS at 09:36

## 2019-04-17 RX ADMIN — FENTANYL CITRATE 50 MCG: 50 INJECTION, SOLUTION INTRAMUSCULAR; INTRAVENOUS at 09:16

## 2019-04-17 RX ADMIN — FENTANYL CITRATE 50 MCG: 50 INJECTION, SOLUTION INTRAMUSCULAR; INTRAVENOUS at 09:22

## 2019-04-17 RX ADMIN — FENTANYL CITRATE 50 MCG: 50 INJECTION, SOLUTION INTRAMUSCULAR; INTRAVENOUS at 09:48

## 2019-04-17 RX ADMIN — MIDAZOLAM HYDROCHLORIDE 1 MG: 1 INJECTION, SOLUTION INTRAMUSCULAR; INTRAVENOUS at 09:19

## 2019-04-17 RX ADMIN — FENTANYL CITRATE 50 MCG: 50 INJECTION, SOLUTION INTRAMUSCULAR; INTRAVENOUS at 09:51

## 2019-04-17 RX ADMIN — FENTANYL CITRATE 50 MCG: 50 INJECTION, SOLUTION INTRAMUSCULAR; INTRAVENOUS at 09:26

## 2019-04-17 RX ADMIN — MIDAZOLAM HYDROCHLORIDE 1 MG: 1 INJECTION, SOLUTION INTRAMUSCULAR; INTRAVENOUS at 09:29

## 2019-04-17 RX ADMIN — FENTANYL CITRATE 50 MCG: 50 INJECTION, SOLUTION INTRAMUSCULAR; INTRAVENOUS at 09:36

## 2019-04-24 ENCOUNTER — TELEPHONE (OUTPATIENT)
Dept: FAMILY MEDICINE CLINIC | Facility: CLINIC | Age: 54
End: 2019-04-24

## 2019-04-24 ENCOUNTER — APPOINTMENT (OUTPATIENT)
Dept: PHYSICAL THERAPY | Facility: CLINIC | Age: 54
End: 2019-04-24
Payer: COMMERCIAL

## 2019-05-02 ENCOUNTER — CLINICAL SUPPORT (OUTPATIENT)
Dept: FAMILY MEDICINE CLINIC | Facility: CLINIC | Age: 54
End: 2019-05-02

## 2019-05-02 VITALS — DIASTOLIC BLOOD PRESSURE: 80 MMHG | SYSTOLIC BLOOD PRESSURE: 124 MMHG

## 2019-05-02 DIAGNOSIS — I10 ESSENTIAL HYPERTENSION: Primary | ICD-10-CM

## 2019-05-07 ENCOUNTER — DOCUMENTATION (OUTPATIENT)
Dept: UROLOGY | Facility: AMBULATORY SURGERY CENTER | Age: 54
End: 2019-05-07

## 2019-05-08 ENCOUNTER — OFFICE VISIT (OUTPATIENT)
Dept: HEMATOLOGY ONCOLOGY | Facility: CLINIC | Age: 54
End: 2019-05-08
Payer: COMMERCIAL

## 2019-05-08 VITALS
BODY MASS INDEX: 28.28 KG/M2 | WEIGHT: 202 LBS | TEMPERATURE: 98.7 F | HEART RATE: 90 BPM | RESPIRATION RATE: 14 BRPM | HEIGHT: 71 IN

## 2019-05-08 DIAGNOSIS — C64.9 RENAL CELL CARCINOMA, UNSPECIFIED LATERALITY (HCC): Primary | ICD-10-CM

## 2019-05-08 PROCEDURE — 99215 OFFICE O/P EST HI 40 MIN: CPT | Performed by: SPECIALIST

## 2019-05-08 RX ORDER — TRAMADOL HYDROCHLORIDE 50 MG/1
50 TABLET ORAL EVERY 6 HOURS PRN
Qty: 30 TABLET | Refills: 0 | Status: SHIPPED | OUTPATIENT
Start: 2019-05-08 | End: 2020-02-19 | Stop reason: ALTCHOICE

## 2019-05-09 ENCOUNTER — TELEPHONE (OUTPATIENT)
Dept: UROLOGY | Facility: AMBULATORY SURGERY CENTER | Age: 54
End: 2019-05-09

## 2019-05-14 ENCOUNTER — TELEPHONE (OUTPATIENT)
Dept: UROLOGY | Facility: AMBULATORY SURGERY CENTER | Age: 54
End: 2019-05-14

## 2019-05-21 ENCOUNTER — OFFICE VISIT (OUTPATIENT)
Dept: UROLOGY | Facility: AMBULATORY SURGERY CENTER | Age: 54
End: 2019-05-21
Payer: COMMERCIAL

## 2019-05-21 VITALS
SYSTOLIC BLOOD PRESSURE: 130 MMHG | BODY MASS INDEX: 27.33 KG/M2 | HEART RATE: 85 BPM | HEIGHT: 71 IN | DIASTOLIC BLOOD PRESSURE: 76 MMHG | WEIGHT: 195.2 LBS

## 2019-05-21 DIAGNOSIS — C64.2 RENAL CELL CARCINOMA OF LEFT KIDNEY (HCC): Primary | ICD-10-CM

## 2019-05-21 PROCEDURE — 99214 OFFICE O/P EST MOD 30 MIN: CPT | Performed by: UROLOGY

## 2019-06-04 ENCOUNTER — TRANSCRIBE ORDERS (OUTPATIENT)
Dept: ADMINISTRATIVE | Facility: HOSPITAL | Age: 54
End: 2019-06-04

## 2019-06-04 ENCOUNTER — TELEPHONE (OUTPATIENT)
Dept: UROLOGY | Facility: MEDICAL CENTER | Age: 54
End: 2019-06-04

## 2019-06-04 DIAGNOSIS — C64.9 RENAL CELL CARCINOMA, UNSPECIFIED LATERALITY (HCC): Primary | ICD-10-CM

## 2019-06-10 ENCOUNTER — HOSPITAL ENCOUNTER (OUTPATIENT)
Dept: CT IMAGING | Facility: HOSPITAL | Age: 54
Discharge: HOME | End: 2019-06-10
Payer: COMMERCIAL

## 2019-06-10 DIAGNOSIS — C64.9 RENAL CELL CARCINOMA, UNSPECIFIED LATERALITY (HCC): ICD-10-CM

## 2019-06-10 PROCEDURE — 70470 CT HEAD/BRAIN W/O & W/DYE: CPT

## 2019-06-10 PROCEDURE — 71260 CT THORAX DX C+: CPT

## 2019-06-10 PROCEDURE — 74177 CT ABD & PELVIS W/CONTRAST: CPT

## 2019-06-10 RX ADMIN — IOHEXOL 100 ML: 350 INJECTION, SOLUTION INTRAVENOUS at 09:21

## 2019-06-14 ENCOUNTER — HOSPITAL ENCOUNTER (OUTPATIENT)
Dept: NUCLEAR MEDICINE | Facility: HOSPITAL | Age: 54
Discharge: HOME | End: 2019-06-14
Payer: COMMERCIAL

## 2019-06-14 DIAGNOSIS — C64.9 RENAL CELL CARCINOMA, UNSPECIFIED LATERALITY (HCC): ICD-10-CM

## 2019-06-14 PROCEDURE — A9503 TC99M MEDRONATE: HCPCS

## 2019-06-14 PROCEDURE — 78306 BONE IMAGING WHOLE BODY: CPT

## 2019-07-12 ENCOUNTER — OFFICE VISIT (OUTPATIENT)
Dept: URGENT CARE | Facility: MEDICAL CENTER | Age: 54
End: 2019-07-12
Payer: COMMERCIAL

## 2019-07-12 ENCOUNTER — APPOINTMENT (OUTPATIENT)
Dept: RADIOLOGY | Facility: MEDICAL CENTER | Age: 54
End: 2019-07-12
Payer: COMMERCIAL

## 2019-07-12 VITALS
HEIGHT: 71 IN | DIASTOLIC BLOOD PRESSURE: 78 MMHG | SYSTOLIC BLOOD PRESSURE: 127 MMHG | RESPIRATION RATE: 16 BRPM | OXYGEN SATURATION: 99 % | HEART RATE: 100 BPM | WEIGHT: 175 LBS | TEMPERATURE: 98.1 F | BODY MASS INDEX: 24.5 KG/M2

## 2019-07-12 DIAGNOSIS — M25.571 ACUTE RIGHT ANKLE PAIN: ICD-10-CM

## 2019-07-12 DIAGNOSIS — S82.54XA CLOSED NONDISPLACED FRACTURE OF MEDIAL MALLEOLUS OF RIGHT TIBIA, INITIAL ENCOUNTER: Primary | ICD-10-CM

## 2019-07-12 PROCEDURE — S9083 URGENT CARE CENTER GLOBAL: HCPCS | Performed by: FAMILY MEDICINE

## 2019-07-12 PROCEDURE — G0383 LEV 4 HOSP TYPE B ED VISIT: HCPCS | Performed by: FAMILY MEDICINE

## 2019-07-12 PROCEDURE — 73610 X-RAY EXAM OF ANKLE: CPT

## 2019-07-12 NOTE — PROGRESS NOTES
[facility]        NAME: Jaden Berry  is a 48 y o  male  : 1965    MRN: [id number]  DATE: 2019  TIME: 11:36 AM    Assessment and Plan   Closed nondisplaced fracture of medial malleolus of right tibia, initial encounter [S82 [id number]]  1  Closed nondisplaced fracture of medial malleolus of right tibia, initial encounter  Cam Boot   2  Acute right ankle pain  XR ankle 3+ vw right    Cam Boot     Avulsion fracture at the tip of medial malleolus   Start walking boot  Follow up with Ortho     Patient Instructions       Follow up with PCP in 3-5 days  Proceed to  ER if symptoms worsen  Chief Complaint     Chief Complaint   Patient presents with    Ankle Pain     twisted right ankle last  continues with pain         History of Present Illness        80-year-old male who presents with right ankle injury  He states that he was out in the yard couple weeks ago and he accidentally twisted his right ankle while he was going down a few steps  Ever since he has had localized swelling and pain  Pain is worse with ambulation and better with rest   He has been able to bear weight however for the past few days to give the ankle rest he has been using crutches  He denies any chronic ankle instability  However does have flat feet        Review of Systems   Review of Systems  As above     Current Medications       Current Outpatient Medications:     baclofen 10 mg tablet, Take 1 tablet (10 mg total) by mouth 3 (three) times a day (Patient not taking: Reported on 2019), Disp: 30 tablet, Rfl: 0    ergocalciferol (VITAMIN D2) 50,000 units, Take 1 capsule (50,000 Units total) by mouth once a week (Patient not taking: Reported on 2019), Disp: 12 capsule, Rfl: 0    fenofibrate (TRIGLIDE) 160 MG tablet, TAKE ONE TABLET BY MOUTH DAILY  (Patient not taking: Reported on 2019), Disp: 90 tablet, Rfl: 3    hydrALAZINE (APRESOLINE) 100 MG tablet, TAKE ONE TABLET BY MOUTH TWICE DAILY , Disp: 180 tablet, Rfl: 1    simvastatin (ZOCOR) 80 mg tablet, TAKE ONE TABLET BY MOUTH DAILY  (Patient not taking: Reported on 7/12/2019), Disp: 90 tablet, Rfl: 3    traMADol (ULTRAM) 50 mg tablet, Take 1 tablet (50 mg total) by mouth every 6 (six) hours as needed for moderate pain (Patient not taking: Reported on 7/12/2019), Disp: 30 tablet, Rfl: 0    Current Allergies     Allergies as of 07/12/2019 - Reviewed 07/12/2019   Allergen Reaction Noted    Nifedipine  03/30/2015    Sulfa antibiotics Hives 05/16/2012            The following portions of the patient's history were reviewed and updated as appropriate: allergies, current medications, past family history, past medical history, past social history, past surgical history and problem list      Past Medical History:   Diagnosis Date    Anxiety disorder     resolved 3/25/15    Arthritis     Chronic kidney disease     H/O renal cell carcinoma     last assessed 1/10/18    Hyperlipidemia     Hypertension     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([facility] [address] )     resolved 11/29/14    Overweight     resolved 7/8/14    Solitary pulmonary nodule on lung CT     resolved 11/19/14       Past Surgical History:   Procedure Laterality Date    ANKLE FRACTURE SURGERY      closed tx of bimalleolar    COLONOSCOPY  04/23/2014    fiberoptic screening     CT NEEDLE BIOPSY RETROPERITONEUM  4/17/2019    NEPHRECTOMY      UMBILICAL HERNIA REPAIR      patient states this was at birth       Family History   Problem Relation Age of Onset    Diabetes Family         mellitus    Hypertension Family     Other Family         visual impairment         Medications have been verified  Objective   /78   Pulse 100   Temp 98 1 °F (36 7 °C) (Tympanic)   Resp 16   Ht 5' 11" (1 803 m)   Wt 79 4 kg (175 lb)   SpO2 99%   BMI 24 41 kg/m²        Physical Exam     Physical Exam   Constitutional: He is oriented to person, place, and time  He appears well-developed and well-nourished     HENT: Head: Normocephalic and atraumatic  Eyes: Conjunctivae are normal    Neck: Neck supple  Cardiovascular: Normal rate  Pulmonary/Chest: Effort normal  No respiratory distress  Abdominal: Soft  He exhibits no distension  Musculoskeletal:   Point tenderness over the medial malleolus of the ankle  No tenderness over the lateral aspect  Swelling over the lateral and medial aspect of the ankle  Full range of motion  Strength 5/5 in all groups  Sensation intact   Distal pulses 2+     Neurological: He is alert and oriented to person, place, and time  Skin: Skin is warm and dry  No rash noted  Psychiatric: He has a normal mood and affect   His behavior is normal  Judgment and thought content normal

## 2019-08-29 ENCOUNTER — TRANSCRIBE ORDERS (OUTPATIENT)
Dept: ADMINISTRATIVE | Facility: HOSPITAL | Age: 54
End: 2019-08-29

## 2019-08-29 DIAGNOSIS — C64.9 RENAL CELL CARCINOMA, UNSPECIFIED LATERALITY (HCC): Primary | ICD-10-CM

## 2019-08-31 ENCOUNTER — HOSPITAL ENCOUNTER (OUTPATIENT)
Dept: CT IMAGING | Facility: HOSPITAL | Age: 54
Discharge: HOME | End: 2019-08-31
Payer: COMMERCIAL

## 2019-08-31 DIAGNOSIS — C64.9 RENAL CELL CARCINOMA, UNSPECIFIED LATERALITY (HCC): ICD-10-CM

## 2019-08-31 PROCEDURE — 71260 CT THORAX DX C+: CPT

## 2019-08-31 PROCEDURE — 74177 CT ABD & PELVIS W/CONTRAST: CPT

## 2019-08-31 RX ADMIN — IOHEXOL 100 ML: 350 INJECTION, SOLUTION INTRAVENOUS at 08:54

## 2019-09-13 ENCOUNTER — TRANSCRIBE ORDERS (OUTPATIENT)
Dept: ADMINISTRATIVE | Facility: HOSPITAL | Age: 54
End: 2019-09-13

## 2019-09-13 DIAGNOSIS — K76.9 LIVER LESION: Primary | ICD-10-CM

## 2019-12-04 ENCOUNTER — TRANSCRIBE ORDERS (OUTPATIENT)
Dept: ADMINISTRATIVE | Facility: HOSPITAL | Age: 54
End: 2019-12-04

## 2019-12-04 DIAGNOSIS — C64.9 RENAL CELL CARCINOMA, UNSPECIFIED LATERALITY (HCC): Primary | ICD-10-CM

## 2020-01-02 ENCOUNTER — HOSPITAL ENCOUNTER (OUTPATIENT)
Dept: CT IMAGING | Facility: HOSPITAL | Age: 55
Discharge: HOME | End: 2020-01-02
Payer: COMMERCIAL

## 2020-01-02 DIAGNOSIS — C64.9 RENAL CELL CARCINOMA, UNSPECIFIED LATERALITY (HCC): ICD-10-CM

## 2020-01-02 PROCEDURE — 71260 CT THORAX DX C+: CPT

## 2020-01-02 PROCEDURE — 74177 CT ABD & PELVIS W/CONTRAST: CPT

## 2020-01-02 RX ADMIN — IODIXANOL 100 ML: 320 INJECTION, SOLUTION INTRAVASCULAR at 15:34

## 2020-01-20 ENCOUNTER — TELEPHONE (OUTPATIENT)
Dept: FAMILY MEDICINE CLINIC | Facility: CLINIC | Age: 55
End: 2020-01-20

## 2020-01-20 DIAGNOSIS — I10 ESSENTIAL HYPERTENSION: ICD-10-CM

## 2020-01-20 DIAGNOSIS — E78.2 MIXED HYPERLIPIDEMIA: ICD-10-CM

## 2020-01-20 DIAGNOSIS — E55.9 VITAMIN D DEFICIENCY: Primary | ICD-10-CM

## 2020-01-20 DIAGNOSIS — R73.9 HYPERGLYCEMIA: ICD-10-CM

## 2020-01-20 DIAGNOSIS — Z12.5 SCREENING FOR PROSTATE CANCER: ICD-10-CM

## 2020-02-12 LAB — HBA1C MFR BLD HPLC: 8 %

## 2020-02-19 ENCOUNTER — OFFICE VISIT (OUTPATIENT)
Dept: FAMILY MEDICINE CLINIC | Facility: CLINIC | Age: 55
End: 2020-02-19
Payer: COMMERCIAL

## 2020-02-19 VITALS
BODY MASS INDEX: 25.37 KG/M2 | WEIGHT: 181.2 LBS | DIASTOLIC BLOOD PRESSURE: 84 MMHG | SYSTOLIC BLOOD PRESSURE: 124 MMHG | HEIGHT: 71 IN

## 2020-02-19 DIAGNOSIS — Z00.00 ENCOUNTER FOR WELLNESS EXAMINATION IN ADULT: Primary | ICD-10-CM

## 2020-02-19 DIAGNOSIS — R63.4 WEIGHT LOSS, ABNORMAL: ICD-10-CM

## 2020-02-19 DIAGNOSIS — I10 ESSENTIAL HYPERTENSION: ICD-10-CM

## 2020-02-19 DIAGNOSIS — R73.9 HYPERGLYCEMIA: ICD-10-CM

## 2020-02-19 DIAGNOSIS — E78.2 MIXED HYPERLIPIDEMIA: ICD-10-CM

## 2020-02-19 DIAGNOSIS — E55.9 VITAMIN D DEFICIENCY: ICD-10-CM

## 2020-02-19 PROCEDURE — 99396 PREV VISIT EST AGE 40-64: CPT | Performed by: FAMILY MEDICINE

## 2020-02-19 PROCEDURE — 3079F DIAST BP 80-89 MM HG: CPT | Performed by: FAMILY MEDICINE

## 2020-02-19 PROCEDURE — 3008F BODY MASS INDEX DOCD: CPT | Performed by: FAMILY MEDICINE

## 2020-02-19 PROCEDURE — 3074F SYST BP LT 130 MM HG: CPT | Performed by: FAMILY MEDICINE

## 2020-02-19 RX ORDER — PANTOPRAZOLE SODIUM 20 MG/1
20 TABLET, DELAYED RELEASE ORAL DAILY
COMMUNITY
Start: 2020-02-12 | End: 2022-01-14

## 2020-02-19 RX ORDER — OXYCODONE HYDROCHLORIDE 10 MG/1
10 TABLET ORAL EVERY 6 HOURS PRN
COMMUNITY
Start: 2020-01-08 | End: 2022-02-02 | Stop reason: HOSPADM

## 2020-02-19 NOTE — PROGRESS NOTES
Assessment/Plan:    Vitamin D deficiency   Needs to restart his supplements  Renal cell carcinoma [facility])   Follow-up with oncology as scheduled  Essential hypertension    Recheck in 6 months stable off of medicine  Hyperglycemia   HB A1c is 6 0 continue diet and exercise  Diagnoses and all orders for this visit:    Encounter for wellness examination in adult    Vitamin D deficiency    Essential hypertension    Hyperglycemia    Mixed hyperlipidemia    Weight loss, abnormal    Other orders  -     oxyCODONE (ROXICODONE) 10 MG TABS; Take 10 mg by mouth every 6 (six) hours as needed  -     pantoprazole (PROTONIX) 20 mg tablet; Take 20 mg by mouth daily          Subjective:   Chief Complaint   Patient presents with    Hypertension    Hyperlipidemia     review blood work  Patient ID: Marie Ruiz  is a 47 y o  male  He is here for   A yearly wellness exam and  follow-up on his blood pressure, cholesterol, weight, and metastatic kidney cancer  I last saw him in the spring of last year, he was having back pain and he was set up for CT scan  Unfortunately, that revealed no recurrent tumor and patient has been through another round of chemotherapy and imaging  He seems to be recovering  Chemo seems to be helping  The tumor has shrunk in size  He has lost a significant amount of weight  He has regained some of it back, his appetite is slowly returning  He has been able to continue to work  Actually feels quite well today  He was able to get off of many of his medications  The following portions of the patient's history were reviewed and updated as appropriate: allergies, current medications, past family history, past medical history, past social history, past surgical history and problem list     Review of Systems   Constitutional: Positive for unexpected weight change (40+ lbs)  Negative for appetite change, chills, diaphoresis, fatigue and fever     HENT: Negative for congestion, ear pain, hearing loss, nosebleeds, postnasal drip, rhinorrhea, sinus pressure, sore throat, tinnitus and trouble swallowing  Eyes: Negative for photophobia, pain, discharge, redness, itching and visual disturbance  Respiratory: Negative for cough, chest tightness, shortness of breath and wheezing  Cardiovascular: Negative for chest pain, palpitations and leg swelling  Denies orthopnea , dyspnea on exertion   Gastrointestinal: Negative for abdominal distention, abdominal pain, blood in stool, constipation, diarrhea, nausea and vomiting  Endocrine: Negative  Genitourinary: Negative for difficulty urinating, dysuria, flank pain, frequency, hematuria and urgency  Denies nocturia , erectile dysfunction   Musculoskeletal: Positive for arthralgias (  In his hands and shoulders in particular after chemotherapy  This is a known side effect  )  Negative for back pain, gait problem, joint swelling and myalgias  Skin: Negative for pallor, rash and wound  Denies skin lesions   Allergic/Immunologic: Negative for environmental allergies, food allergies and immunocompromised state  Neurological: Negative for dizziness, tremors, seizures, syncope, speech difficulty, weakness, numbness and headaches  Hematological: Negative for adenopathy  Does not bruise/bleed easily  Psychiatric/Behavioral: Negative for behavioral problems, confusion, decreased concentration, dysphoric mood, sleep disturbance and suicidal ideas  The patient is not nervous/anxious  Objective:      /84   Ht 5' 11" (1 803 m)   Wt 82 2 kg (181 lb 3 2 oz)   BMI 25 27 kg/m²          Physical Exam   Constitutional: He is oriented to person, place, and time  He appears well-developed and well-nourished  HENT:   Head: Normocephalic and atraumatic  Nose: Nose normal    Mouth/Throat: Oropharynx is clear and moist    Eyes: Pupils are equal, round, and reactive to light   Conjunctivae and EOM are normal    Neck: Normal range of motion  Neck supple  No JVD present  No tracheal deviation present  No thyromegaly present  Cardiovascular: Normal rate, regular rhythm and intact distal pulses  No murmur heard  Pulmonary/Chest: Effort normal and breath sounds normal  He has no wheezes  He has no rales  Abdominal: Soft  Bowel sounds are normal  He exhibits no mass  There is no tenderness  There is no rebound and no guarding  Musculoskeletal: He exhibits no edema, tenderness or deformity  Lymphadenopathy:     He has no cervical adenopathy  Neurological: He is alert and oriented to person, place, and time  He has normal reflexes  He displays normal reflexes  No cranial nerve deficit  He exhibits normal muscle tone  Coordination normal    Skin: Skin is warm and dry  No lesion and no rash noted  Nails show no clubbing  Psychiatric: He has a normal mood and affect  Judgment normal    Nursing note and vitals reviewed  Recent lab work is reviewed

## 2020-03-18 ENCOUNTER — TRANSCRIBE ORDERS (OUTPATIENT)
Dept: ADMINISTRATIVE | Facility: HOSPITAL | Age: 55
End: 2020-03-18

## 2020-03-18 DIAGNOSIS — C64.9 RENAL CELL CARCINOMA, UNSPECIFIED LATERALITY (HCC): Primary | ICD-10-CM

## 2020-03-27 ENCOUNTER — HOSPITAL ENCOUNTER (OUTPATIENT)
Dept: CT IMAGING | Facility: HOSPITAL | Age: 55
Discharge: HOME | End: 2020-03-27
Payer: COMMERCIAL

## 2020-03-27 DIAGNOSIS — C64.9 RENAL CELL CARCINOMA, UNSPECIFIED LATERALITY (HCC): ICD-10-CM

## 2020-03-27 PROCEDURE — 71260 CT THORAX DX C+: CPT

## 2020-03-27 PROCEDURE — 74177 CT ABD & PELVIS W/CONTRAST: CPT

## 2020-03-27 RX ADMIN — IODIXANOL 100 ML: 320 INJECTION, SOLUTION INTRAVASCULAR at 12:38

## 2020-06-10 ENCOUNTER — TRANSCRIBE ORDERS (OUTPATIENT)
Dept: ADMINISTRATIVE | Facility: HOSPITAL | Age: 55
End: 2020-06-10

## 2020-06-10 DIAGNOSIS — C64.9 RENAL CELL CARCINOMA, UNSPECIFIED LATERALITY (HCC): Primary | ICD-10-CM

## 2020-06-23 ENCOUNTER — HOSPITAL ENCOUNTER (OUTPATIENT)
Dept: CT IMAGING | Facility: HOSPITAL | Age: 55
Discharge: HOME | End: 2020-06-23
Payer: COMMERCIAL

## 2020-06-23 DIAGNOSIS — C64.9 RENAL CELL CARCINOMA, UNSPECIFIED LATERALITY (HCC): ICD-10-CM

## 2020-06-23 PROCEDURE — 74177 CT ABD & PELVIS W/CONTRAST: CPT

## 2020-06-23 PROCEDURE — 71260 CT THORAX DX C+: CPT

## 2020-06-23 RX ADMIN — IOHEXOL 100 ML: 350 INJECTION, SOLUTION INTRAVENOUS at 17:15

## 2020-09-02 ENCOUNTER — TRANSCRIBE ORDERS (OUTPATIENT)
Dept: ADMINISTRATIVE | Facility: HOSPITAL | Age: 55
End: 2020-09-02

## 2020-09-02 ENCOUNTER — OFFICE VISIT (OUTPATIENT)
Dept: FAMILY MEDICINE CLINIC | Facility: CLINIC | Age: 55
End: 2020-09-02
Payer: COMMERCIAL

## 2020-09-02 VITALS
WEIGHT: 200 LBS | DIASTOLIC BLOOD PRESSURE: 78 MMHG | SYSTOLIC BLOOD PRESSURE: 140 MMHG | TEMPERATURE: 97.6 F | BODY MASS INDEX: 28 KG/M2 | HEIGHT: 71 IN

## 2020-09-02 DIAGNOSIS — E55.9 VITAMIN D DEFICIENCY: ICD-10-CM

## 2020-09-02 DIAGNOSIS — I10 ESSENTIAL HYPERTENSION: ICD-10-CM

## 2020-09-02 DIAGNOSIS — C64.9 SARCOMATOID RENAL CELL CARCINOMA (HCC): Primary | ICD-10-CM

## 2020-09-02 DIAGNOSIS — C64.9 RENAL CELL CARCINOMA, UNSPECIFIED LATERALITY (HCC): Primary | ICD-10-CM

## 2020-09-02 DIAGNOSIS — R73.9 HYPERGLYCEMIA: ICD-10-CM

## 2020-09-02 DIAGNOSIS — E78.2 MIXED HYPERLIPIDEMIA: ICD-10-CM

## 2020-09-02 LAB — SL AMB POCT HEMOGLOBIN AIC: 6 (ref ?–6.5)

## 2020-09-02 PROCEDURE — 3077F SYST BP >= 140 MM HG: CPT | Performed by: FAMILY MEDICINE

## 2020-09-02 PROCEDURE — 99214 OFFICE O/P EST MOD 30 MIN: CPT | Performed by: FAMILY MEDICINE

## 2020-09-02 PROCEDURE — 83036 HEMOGLOBIN GLYCOSYLATED A1C: CPT | Performed by: FAMILY MEDICINE

## 2020-09-02 PROCEDURE — 3078F DIAST BP <80 MM HG: CPT | Performed by: FAMILY MEDICINE

## 2020-09-02 RX ORDER — AMLODIPINE BESYLATE 5 MG/1
5 TABLET ORAL DAILY
Qty: 90 TABLET | Refills: 1 | Status: SHIPPED | OUTPATIENT
Start: 2020-09-02 | End: 2020-09-16

## 2020-09-02 NOTE — PROGRESS NOTES
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[address] )    Currently getting immune therapy at [facility]  Has a PET scan scheduled in the near future  Will discuss with them whether he should get a flu shot  Diagnoses and all orders for this visit:    Sarcomatoid renal cell carcinoma (HCC)    Essential hypertension  -     amLODIPine (NORVASC) 5 mg tablet; Take 1 tablet (5 mg total) by mouth daily    Mixed hyperlipidemia  -     Lipid panel; Future    Hyperglycemia  -     POCT hemoglobin A1c    Vitamin D deficiency  -     Vitamin D 25 hydroxy; Future          Subjective:   Chief Complaint   Patient presents with    Hypertension    Hyperlipidemia          Patient ID: Monika Munroe  is a 47 y o  male  He is here for follow-up on his hypertension, hyperlipidemia, hyperglycemia, and kidney cancer  He has been going down a [employer] for treatment on his metastatic kidney cancer  He is getting immune therapy, last CT scan showed smaller sean aortic mass  He is scheduled for a PET scan in the near future  He had been weaned off his blood pressure medication when he had lost lot of weight  He has put some of it back on and is more stable at this time  Blood pressure has been elevated at [facility], and is today in the office as well        The following portions of the patient's history were reviewed and updated as appropriate: allergies, current medications, past family history, past medical history, past social history, past surgical history and problem list     Review of Systems   Constitutional: Negative for appetite change, chills, diaphoresis, fatigue, fever and unexpected weight change  HENT: Negative for congestion, ear pain, hearing loss, nosebleeds, postnasal drip, rhinorrhea, sinus pressure, sore throat, tinnitus and trouble swallowing  Eyes: Negative for photophobia, pain, discharge, redness, itching and visual disturbance  Respiratory: Negative for cough, chest tightness, shortness of breath and wheezing  Cardiovascular: Negative for chest pain, palpitations and leg swelling  Denies orthopnea , dyspnea on exertion   Gastrointestinal: Negative for abdominal distention, abdominal pain, blood in stool, constipation, diarrhea, nausea and vomiting  Endocrine: Negative  Genitourinary: Negative for difficulty urinating, dysuria, flank pain, frequency, hematuria and urgency  Denies nocturia , erectile dysfunction   Musculoskeletal: Negative for arthralgias, back pain, gait problem, joint swelling and myalgias  Skin: Negative for pallor, rash and wound  Denies skin lesions   Allergic/Immunologic: Negative for environmental allergies, food allergies and immunocompromised state  Neurological: Negative for dizziness, tremors, seizures, syncope, speech difficulty, weakness, numbness and headaches  Hematological: Negative for adenopathy  Does not bruise/bleed easily  Psychiatric/Behavioral: Negative for behavioral problems, confusion, sleep disturbance and suicidal ideas  The patient is not nervous/anxious  Objective:      /78   Temp 97 6 °F (36 4 °C)   Ht 5' 11" (1 803 m)   Wt 90 7 kg (200 lb)   BMI 27 89 kg/m²          Physical Exam  Constitutional:       Appearance: He is well-developed  HENT:      Head: Normocephalic and atraumatic        Nose: Nose normal    Eyes:      Conjunctiva/sclera: Conjunctivae normal       Pupils: Pupils are equal, round, and reactive to light  Neck:      Musculoskeletal: Normal range of motion and neck supple  Thyroid: No thyromegaly  Vascular: No JVD  Trachea: No tracheal deviation  Cardiovascular:      Rate and Rhythm: Normal rate and regular rhythm  Heart sounds: No murmur  Pulmonary:      Effort: Pulmonary effort is normal       Breath sounds: Normal breath sounds  No wheezing or rales  Abdominal:      General: Bowel sounds are normal       Palpations: Abdomen is soft  There is no mass  Tenderness: There is no abdominal tenderness  There is no guarding or rebound  Musculoskeletal:         General: No tenderness or deformity  Lymphadenopathy:      Cervical: No cervical adenopathy  Skin:     General: Skin is warm and dry  Findings: No lesion or rash  Nails: There is no clubbing  Neurological:      Mental Status: He is alert and oriented to person, place, and time  Cranial Nerves: No cranial nerve deficit  Motor: No abnormal muscle tone  Coordination: Coordination normal       Deep Tendon Reflexes: Reflexes are normal and symmetric   Reflexes normal    Psychiatric:         Judgment: Judgment normal

## 2020-09-02 NOTE — ASSESSMENT & PLAN NOTE
Have been diet-controlled after being on medication for years  At this point will restart amlodipine 5 mg daily  Due for lab work  Recheck with the nursing visit in 2 weeks, I will see him in November

## 2020-09-02 NOTE — ASSESSMENT & PLAN NOTE
Currently taking 500 units per day, recommend he take 2000 units per day, recheck labs at this time

## 2020-09-02 NOTE — ASSESSMENT & PLAN NOTE
Currently getting immune therapy at [facility]  Has a PET scan scheduled in the near future  Will discuss with them whether he should get a flu shot

## 2020-09-15 ENCOUNTER — HOSPITAL ENCOUNTER (OUTPATIENT)
Dept: CT IMAGING | Facility: HOSPITAL | Age: 55
Discharge: HOME | End: 2020-09-15
Payer: COMMERCIAL

## 2020-09-15 DIAGNOSIS — C64.9 RENAL CELL CARCINOMA, UNSPECIFIED LATERALITY (HCC): ICD-10-CM

## 2020-09-15 PROCEDURE — 74177 CT ABD & PELVIS W/CONTRAST: CPT

## 2020-09-15 PROCEDURE — 71260 CT THORAX DX C+: CPT

## 2020-09-15 PROCEDURE — G1004 CDSM NDSC: HCPCS

## 2020-09-15 RX ADMIN — IODIXANOL 100 ML: 320 INJECTION, SOLUTION INTRAVASCULAR at 19:59

## 2020-09-16 ENCOUNTER — CLINICAL SUPPORT (OUTPATIENT)
Dept: FAMILY MEDICINE CLINIC | Facility: CLINIC | Age: 55
End: 2020-09-16

## 2020-09-16 DIAGNOSIS — I10 ESSENTIAL HYPERTENSION: Primary | ICD-10-CM

## 2020-09-16 RX ORDER — AMLODIPINE BESYLATE 10 MG/1
10 TABLET ORAL DAILY
Qty: 30 TABLET | Refills: 1 | Status: SHIPPED | OUTPATIENT
Start: 2020-09-16 | End: 2020-11-13

## 2020-09-16 NOTE — PROGRESS NOTES
Assessment/Plan:      There are no diagnoses linked to this encounter  Subjective:  Chief Complaint   Patient presents with    Hypertension     BP check        Patient ID: Monika Munroe  is a 47 y o  male      HPI    Review of Systems      Objective:     Physical Exam

## 2020-09-25 ENCOUNTER — TELEPHONE (OUTPATIENT)
Dept: FAMILY MEDICINE CLINIC | Facility: CLINIC | Age: 55
End: 2020-09-25

## 2020-09-28 ENCOUNTER — CLINICAL SUPPORT (OUTPATIENT)
Dept: FAMILY MEDICINE CLINIC | Facility: CLINIC | Age: 55
End: 2020-09-28

## 2020-09-28 VITALS — SYSTOLIC BLOOD PRESSURE: 140 MMHG | DIASTOLIC BLOOD PRESSURE: 80 MMHG

## 2020-09-28 DIAGNOSIS — I10 ESSENTIAL HYPERTENSION: Primary | ICD-10-CM

## 2020-09-28 NOTE — PROGRESS NOTES
Assessment/Plan:      There are no diagnoses linked to this encounter  Subjective:  Chief Complaint   Patient presents with    Hypertension     BP check        Patient ID: Jose D Powers  is a 47 y o  male      HPI    Review of Systems      Objective:     Physical Exam

## 2020-11-13 ENCOUNTER — OFFICE VISIT (OUTPATIENT)
Dept: FAMILY MEDICINE CLINIC | Facility: CLINIC | Age: 55
End: 2020-11-13
Payer: COMMERCIAL

## 2020-11-13 VITALS
TEMPERATURE: 97.9 F | DIASTOLIC BLOOD PRESSURE: 84 MMHG | WEIGHT: 210.8 LBS | HEIGHT: 71 IN | SYSTOLIC BLOOD PRESSURE: 140 MMHG | BODY MASS INDEX: 29.51 KG/M2

## 2020-11-13 DIAGNOSIS — E55.9 VITAMIN D DEFICIENCY: ICD-10-CM

## 2020-11-13 DIAGNOSIS — R73.9 HYPERGLYCEMIA: ICD-10-CM

## 2020-11-13 DIAGNOSIS — I10 ESSENTIAL HYPERTENSION: Primary | ICD-10-CM

## 2020-11-13 DIAGNOSIS — E78.2 MIXED HYPERLIPIDEMIA: ICD-10-CM

## 2020-11-13 DIAGNOSIS — C64.9 SARCOMATOID RENAL CELL CARCINOMA (HCC): ICD-10-CM

## 2020-11-13 DIAGNOSIS — R63.4 WEIGHT LOSS, ABNORMAL: ICD-10-CM

## 2020-11-13 DIAGNOSIS — Z23 NEED FOR VACCINATION: ICD-10-CM

## 2020-11-13 PROBLEM — M54.50 CHRONIC LEFT-SIDED LOW BACK PAIN WITHOUT SCIATICA: Status: RESOLVED | Noted: 2019-03-13 | Resolved: 2020-11-13

## 2020-11-13 PROBLEM — G89.29 CHRONIC LEFT-SIDED LOW BACK PAIN WITHOUT SCIATICA: Status: RESOLVED | Noted: 2019-03-13 | Resolved: 2020-11-13

## 2020-11-13 PROBLEM — E66.09 CLASS 1 OBESITY DUE TO EXCESS CALORIES WITH SERIOUS COMORBIDITY AND BODY MASS INDEX (BMI) OF 33.0 TO 33.9 IN ADULT: Status: RESOLVED | Noted: 2019-01-16 | Resolved: 2020-11-13

## 2020-11-13 PROCEDURE — 90682 RIV4 VACC RECOMBINANT DNA IM: CPT | Performed by: FAMILY MEDICINE

## 2020-11-13 PROCEDURE — 99214 OFFICE O/P EST MOD 30 MIN: CPT | Performed by: FAMILY MEDICINE

## 2020-11-13 PROCEDURE — 90471 IMMUNIZATION ADMIN: CPT | Performed by: FAMILY MEDICINE

## 2020-11-13 PROCEDURE — 3077F SYST BP >= 140 MM HG: CPT | Performed by: FAMILY MEDICINE

## 2020-11-13 PROCEDURE — 3008F BODY MASS INDEX DOCD: CPT | Performed by: FAMILY MEDICINE

## 2020-11-13 PROCEDURE — 3079F DIAST BP 80-89 MM HG: CPT | Performed by: FAMILY MEDICINE

## 2020-11-13 RX ORDER — AMLODIPINE BESYLATE AND BENAZEPRIL HYDROCHLORIDE 10; 20 MG/1; MG/1
1 CAPSULE ORAL DAILY
Qty: 90 CAPSULE | Refills: 1 | Status: SHIPPED | OUTPATIENT
Start: 2020-11-13 | End: 2021-05-23

## 2020-12-02 ENCOUNTER — TRANSCRIBE ORDERS (OUTPATIENT)
Dept: ADMINISTRATIVE | Facility: HOSPITAL | Age: 55
End: 2020-12-02

## 2020-12-02 ENCOUNTER — CLINICAL SUPPORT (OUTPATIENT)
Dept: FAMILY MEDICINE CLINIC | Facility: CLINIC | Age: 55
End: 2020-12-02

## 2020-12-02 VITALS — SYSTOLIC BLOOD PRESSURE: 138 MMHG | DIASTOLIC BLOOD PRESSURE: 80 MMHG

## 2020-12-02 DIAGNOSIS — C64.9 MALIGNANT NEOPLASM OF UNSPECIFIED KIDNEY, EXCEPT RENAL PELVIS (HCC): Primary | ICD-10-CM

## 2020-12-02 DIAGNOSIS — I10 ESSENTIAL HYPERTENSION: Primary | ICD-10-CM

## 2020-12-12 ENCOUNTER — HOSPITAL ENCOUNTER (OUTPATIENT)
Dept: CT IMAGING | Facility: HOSPITAL | Age: 55
Discharge: HOME | End: 2020-12-12
Payer: COMMERCIAL

## 2020-12-12 DIAGNOSIS — C64.9 MALIGNANT NEOPLASM OF UNSPECIFIED KIDNEY, EXCEPT RENAL PELVIS (HCC): ICD-10-CM

## 2020-12-12 PROCEDURE — 74177 CT ABD & PELVIS W/CONTRAST: CPT

## 2020-12-12 PROCEDURE — 71260 CT THORAX DX C+: CPT

## 2020-12-12 PROCEDURE — G1004 CDSM NDSC: HCPCS

## 2020-12-12 RX ADMIN — IOHEXOL 100 ML: 350 INJECTION, SOLUTION INTRAVENOUS at 08:31

## 2021-02-17 ENCOUNTER — OFFICE VISIT (OUTPATIENT)
Dept: FAMILY MEDICINE CLINIC | Facility: CLINIC | Age: 56
End: 2021-02-17
Payer: COMMERCIAL

## 2021-02-17 VITALS
WEIGHT: 221.4 LBS | SYSTOLIC BLOOD PRESSURE: 122 MMHG | HEIGHT: 71 IN | BODY MASS INDEX: 31 KG/M2 | DIASTOLIC BLOOD PRESSURE: 82 MMHG | TEMPERATURE: 97.1 F

## 2021-02-17 DIAGNOSIS — E55.9 VITAMIN D DEFICIENCY: ICD-10-CM

## 2021-02-17 DIAGNOSIS — M17.12 PRIMARY OSTEOARTHRITIS OF LEFT KNEE: ICD-10-CM

## 2021-02-17 DIAGNOSIS — R73.9 HYPERGLYCEMIA: ICD-10-CM

## 2021-02-17 DIAGNOSIS — C64.9 SARCOMATOID RENAL CELL CARCINOMA (HCC): ICD-10-CM

## 2021-02-17 DIAGNOSIS — E78.2 MIXED HYPERLIPIDEMIA: ICD-10-CM

## 2021-02-17 DIAGNOSIS — I10 ESSENTIAL HYPERTENSION: Primary | ICD-10-CM

## 2021-02-17 PROCEDURE — 3008F BODY MASS INDEX DOCD: CPT | Performed by: FAMILY MEDICINE

## 2021-02-17 PROCEDURE — 99214 OFFICE O/P EST MOD 30 MIN: CPT | Performed by: FAMILY MEDICINE

## 2021-02-17 PROCEDURE — 3074F SYST BP LT 130 MM HG: CPT | Performed by: FAMILY MEDICINE

## 2021-02-17 PROCEDURE — 3079F DIAST BP 80-89 MM HG: CPT | Performed by: FAMILY MEDICINE

## 2021-02-17 NOTE — PROGRESS NOTES
Assessment/Plan:    Essential hypertension    Continue amlodipine- benazepril 10- 20 mg daily  Recheck in 6 months  Mixed hyperlipidemia    Has been diet controlled, continue to monitor diet  Hyperglycemia    Lab from [facility] shows mildly elevated sugars, nothing greater than 140  Sarcomatoid renal cell carcinoma ([facility] [address] )   [facility]  Follow-up as scheduled  Diagnoses and all orders for this visit:    Essential hypertension    Mixed hyperlipidemia    Hyperglycemia    Primary osteoarthritis of left knee    Vitamin D deficiency    Sarcomatoid renal cell carcinoma (HCC)          Subjective:   Chief Complaint   Patient presents with    Hypertension     no refills needed           Patient ID: Amanda Hess  is a 54 y o  male  He is here for follow-up on his hypertension, GERD, arthritis, kidney cancer, vitamin-D deficiency  Overall he is doing okay  Patient states that has last CT scan the tumor had regressed somewhat  He continues on therapy at this time  Heartburn is under good control on pantoprazole  Admits he is not as physically active as he used to be, he has gained some weight with the pandemic restrictions  The following portions of the patient's history were reviewed and updated as appropriate: allergies, current medications, past family history, past medical history, past social history, past surgical history and problem list     Review of Systems   Constitutional: Negative for appetite change, chills, diaphoresis, fatigue, fever and unexpected weight change  HENT: Negative for congestion, ear pain, hearing loss, nosebleeds, postnasal drip, rhinorrhea, sinus pressure, sore throat, tinnitus and trouble swallowing  Eyes: Negative for photophobia, pain, discharge, redness, itching and visual disturbance  Respiratory: Negative for cough, chest tightness, shortness of breath and wheezing      Cardiovascular: Negative for chest pain, palpitations and leg swelling  Denies orthopnea , dyspnea on exertion   Gastrointestinal: Negative for abdominal distention, abdominal pain, blood in stool, constipation, diarrhea, nausea and vomiting  Endocrine: Negative  Genitourinary: Negative for difficulty urinating, dysuria, flank pain, frequency, hematuria and urgency  Denies nocturia , erectile dysfunction   Musculoskeletal: Negative for arthralgias, back pain, gait problem, joint swelling and myalgias  Skin: Negative for pallor, rash and wound  Denies skin lesions   Allergic/Immunologic: Negative for environmental allergies, food allergies and immunocompromised state  Neurological: Negative for dizziness, tremors, seizures, syncope, speech difficulty, weakness, numbness and headaches  Hematological: Negative for adenopathy  Does not bruise/bleed easily  Psychiatric/Behavioral: Negative for behavioral problems, confusion, sleep disturbance and suicidal ideas  The patient is not nervous/anxious  Objective:      /82   Temp (!) 97 1 °F (36 2 °C)   Ht 5' 11" (1 803 m)   Wt 100 kg (221 lb 6 4 oz)   BMI 30 88 kg/m²          Physical Exam  Constitutional:       Appearance: He is well-developed  HENT:      Head: Normocephalic and atraumatic  Right Ear: Tympanic membrane and ear canal normal       Left Ear: Tympanic membrane and ear canal normal       Nose: Nose normal    Eyes:      Conjunctiva/sclera: Conjunctivae normal       Pupils: Pupils are equal, round, and reactive to light  Neck:      Musculoskeletal: Normal range of motion and neck supple  Thyroid: No thyromegaly  Vascular: No JVD  Trachea: No tracheal deviation  Cardiovascular:      Rate and Rhythm: Normal rate and regular rhythm  Heart sounds: No murmur  Pulmonary:      Effort: Pulmonary effort is normal       Breath sounds: Normal breath sounds  No wheezing or rales     Abdominal:      General: Bowel sounds are normal       Palpations: Abdomen is soft  There is no mass  Tenderness: There is no abdominal tenderness  There is no guarding or rebound  Musculoskeletal:         General: No tenderness or deformity  Lymphadenopathy:      Cervical: No cervical adenopathy  Skin:     General: Skin is warm and dry  Findings: No lesion or rash  Nails: There is no clubbing  Neurological:      Mental Status: He is alert and oriented to person, place, and time  Cranial Nerves: No cranial nerve deficit  Motor: No abnormal muscle tone  Coordination: Coordination normal       Deep Tendon Reflexes: Reflexes are normal and symmetric  Reflexes normal    Psychiatric:         Judgment: Judgment normal        BMI Counseling: Body mass index is 30 88 kg/m²  The BMI is above normal  Nutrition recommendations include moderation in carbohydrate intake, increasing intake of lean protein and reducing intake of saturated and trans fat  Exercise recommendations include exercising 3-5 times per week

## 2021-02-26 ENCOUNTER — TRANSCRIBE ORDERS (OUTPATIENT)
Dept: ADMINISTRATIVE | Facility: HOSPITAL | Age: 56
End: 2021-02-26

## 2021-02-26 DIAGNOSIS — C64.9: Primary | ICD-10-CM

## 2021-02-26 DIAGNOSIS — C64.9: ICD-10-CM

## 2021-03-02 ENCOUNTER — HOSPITAL ENCOUNTER (OUTPATIENT)
Dept: CT IMAGING | Facility: HOSPITAL | Age: 56
Discharge: HOME | End: 2021-03-02
Payer: COMMERCIAL

## 2021-03-02 DIAGNOSIS — C64.9: ICD-10-CM

## 2021-03-02 PROCEDURE — 71260 CT THORAX DX C+: CPT

## 2021-03-02 PROCEDURE — 74177 CT ABD & PELVIS W/CONTRAST: CPT

## 2021-03-02 PROCEDURE — G1004 CDSM NDSC: HCPCS

## 2021-03-02 RX ADMIN — IODIXANOL 100 ML: 320 INJECTION, SOLUTION INTRAVASCULAR at 16:53

## 2021-04-15 DIAGNOSIS — Z23 ENCOUNTER FOR IMMUNIZATION: ICD-10-CM

## 2021-05-23 DIAGNOSIS — I10 ESSENTIAL HYPERTENSION: ICD-10-CM

## 2021-05-23 RX ORDER — AMLODIPINE BESYLATE AND BENAZEPRIL HYDROCHLORIDE 10; 20 MG/1; MG/1
CAPSULE ORAL
Qty: 90 CAPSULE | Refills: 1 | Status: SHIPPED | OUTPATIENT
Start: 2021-05-23 | End: 2022-01-14

## 2021-05-25 ENCOUNTER — TRANSCRIBE ORDERS (OUTPATIENT)
Dept: ADMINISTRATIVE | Facility: HOSPITAL | Age: 56
End: 2021-05-25

## 2021-05-25 DIAGNOSIS — C64.2 RENAL CELL CARCINOMA OF LEFT KIDNEY (HCC): Primary | ICD-10-CM

## 2021-06-05 ENCOUNTER — HOSPITAL ENCOUNTER (OUTPATIENT)
Dept: CT IMAGING | Facility: HOSPITAL | Age: 56
Discharge: HOME | End: 2021-06-05
Payer: COMMERCIAL

## 2021-06-05 DIAGNOSIS — C64.2 RENAL CELL CARCINOMA OF LEFT KIDNEY (HCC): ICD-10-CM

## 2021-06-05 PROCEDURE — G1004 CDSM NDSC: HCPCS

## 2021-06-05 PROCEDURE — 74177 CT ABD & PELVIS W/CONTRAST: CPT

## 2021-06-05 PROCEDURE — 71260 CT THORAX DX C+: CPT

## 2021-06-05 RX ADMIN — IOHEXOL 100 ML: 350 INJECTION, SOLUTION INTRAVENOUS at 08:37

## 2021-06-18 ENCOUNTER — TELEPHONE (OUTPATIENT)
Dept: FAMILY MEDICINE CLINIC | Facility: CLINIC | Age: 56
End: 2021-06-18

## 2021-08-18 ENCOUNTER — HOSPITAL ENCOUNTER (OUTPATIENT)
Dept: CT IMAGING | Facility: HOSPITAL | Age: 56
Discharge: HOME | End: 2021-08-18
Payer: COMMERCIAL

## 2021-08-18 DIAGNOSIS — C64.9 MALIGNANT NEOPLASM OF UNSPECIFIED KIDNEY, EXCEPT RENAL PELVIS (HCC): ICD-10-CM

## 2021-08-18 DIAGNOSIS — E80.6 OTHER DISORDERS OF BILIRUBIN METABOLISM: ICD-10-CM

## 2021-08-18 PROCEDURE — 71260 CT THORAX DX C+: CPT

## 2021-08-18 PROCEDURE — G1004 CDSM NDSC: HCPCS

## 2021-08-18 PROCEDURE — 74177 CT ABD & PELVIS W/CONTRAST: CPT

## 2021-08-18 RX ADMIN — IOHEXOL 100 ML: 350 INJECTION, SOLUTION INTRAVENOUS at 16:53

## 2022-01-01 DIAGNOSIS — L98.9 SKIN LESION: Primary | ICD-10-CM

## 2022-01-05 ENCOUNTER — HOSPITAL ENCOUNTER (OUTPATIENT)
Dept: CT IMAGING | Facility: HOSPITAL | Age: 57
Discharge: HOME | End: 2022-01-05
Payer: COMMERCIAL

## 2022-01-05 DIAGNOSIS — C64.9 MALIGNANT NEOPLASM OF UNSPECIFIED KIDNEY, EXCEPT RENAL PELVIS (HCC): ICD-10-CM

## 2022-01-05 PROCEDURE — G1004 CDSM NDSC: HCPCS

## 2022-01-05 PROCEDURE — 74177 CT ABD & PELVIS W/CONTRAST: CPT

## 2022-01-05 PROCEDURE — 71260 CT THORAX DX C+: CPT

## 2022-01-05 RX ADMIN — IOHEXOL 100 ML: 350 INJECTION, SOLUTION INTRAVENOUS at 14:24

## 2022-01-14 ENCOUNTER — APPOINTMENT (EMERGENCY)
Dept: MRI IMAGING | Facility: HOSPITAL | Age: 57
End: 2022-01-14
Payer: COMMERCIAL

## 2022-01-14 ENCOUNTER — APPOINTMENT (EMERGENCY)
Dept: CT IMAGING | Facility: HOSPITAL | Age: 57
End: 2022-01-14
Payer: COMMERCIAL

## 2022-01-14 ENCOUNTER — HOSPITAL ENCOUNTER (EMERGENCY)
Facility: HOSPITAL | Age: 57
Discharge: HOME | End: 2022-01-14
Attending: EMERGENCY MEDICINE
Payer: COMMERCIAL

## 2022-01-14 VITALS
WEIGHT: 180.34 LBS | TEMPERATURE: 98.2 F | DIASTOLIC BLOOD PRESSURE: 86 MMHG | OXYGEN SATURATION: 98 % | HEART RATE: 74 BPM | RESPIRATION RATE: 16 BRPM | SYSTOLIC BLOOD PRESSURE: 150 MMHG | BODY MASS INDEX: 25.15 KG/M2

## 2022-01-14 DIAGNOSIS — C79.31 BRAIN METASTASES (HCC): Primary | ICD-10-CM

## 2022-01-14 LAB
2HR DELTA HS TROPONIN: 0 NG/L
ALBUMIN SERPL BCP-MCNC: 4.2 G/DL (ref 3.5–5)
ALP SERPL-CCNC: 104 U/L (ref 46–116)
ALT SERPL W P-5'-P-CCNC: 21 U/L (ref 12–78)
ANION GAP SERPL CALCULATED.3IONS-SCNC: 9 MMOL/L (ref 4–13)
APTT PPP: 29 SECONDS (ref 23–37)
AST SERPL W P-5'-P-CCNC: 18 U/L (ref 5–45)
ATRIAL RATE: 88 BPM
BASOPHILS # BLD AUTO: 0.03 THOUSANDS/ΜL (ref 0–0.1)
BASOPHILS NFR BLD AUTO: 0 % (ref 0–1)
BILIRUB SERPL-MCNC: 0.67 MG/DL (ref 0.2–1)
BUN SERPL-MCNC: 17 MG/DL (ref 5–25)
CALCIUM SERPL-MCNC: 9 MG/DL (ref 8.3–10.1)
CARDIAC TROPONIN I PNL SERPL HS: 16 NG/L
CARDIAC TROPONIN I PNL SERPL HS: 16 NG/L
CHLORIDE SERPL-SCNC: 102 MMOL/L (ref 100–108)
CO2 SERPL-SCNC: 29 MMOL/L (ref 21–32)
CREAT SERPL-MCNC: 0.88 MG/DL (ref 0.6–1.3)
EOSINOPHIL # BLD AUTO: 0.09 THOUSAND/ΜL (ref 0–0.61)
EOSINOPHIL NFR BLD AUTO: 1 % (ref 0–6)
ERYTHROCYTE [DISTWIDTH] IN BLOOD BY AUTOMATED COUNT: 13.3 % (ref 11.6–15.1)
GFR SERPL CREATININE-BSD FRML MDRD: 96 ML/MIN/1.73SQ M
GLUCOSE SERPL-MCNC: 90 MG/DL (ref 65–140)
HCT VFR BLD AUTO: 44.2 % (ref 36.5–49.3)
HGB BLD-MCNC: 14.6 G/DL (ref 12–17)
IMM GRANULOCYTES # BLD AUTO: 0.03 THOUSAND/UL (ref 0–0.2)
IMM GRANULOCYTES NFR BLD AUTO: 0 % (ref 0–2)
INR PPP: 0.96 (ref 0.84–1.19)
LYMPHOCYTES # BLD AUTO: 1.14 THOUSANDS/ΜL (ref 0.6–4.47)
LYMPHOCYTES NFR BLD AUTO: 15 % (ref 14–44)
MCH RBC QN AUTO: 31.6 PG (ref 26.8–34.3)
MCHC RBC AUTO-ENTMCNC: 33 G/DL (ref 31.4–37.4)
MCV RBC AUTO: 96 FL (ref 82–98)
MONOCYTES # BLD AUTO: 0.58 THOUSAND/ΜL (ref 0.17–1.22)
MONOCYTES NFR BLD AUTO: 8 % (ref 4–12)
NEUTROPHILS # BLD AUTO: 5.64 THOUSANDS/ΜL (ref 1.85–7.62)
NEUTS SEG NFR BLD AUTO: 76 % (ref 43–75)
NRBC BLD AUTO-RTO: 0 /100 WBCS
P AXIS: 48 DEGREES
PLATELET # BLD AUTO: 226 THOUSANDS/UL (ref 149–390)
PMV BLD AUTO: 10.4 FL (ref 8.9–12.7)
POTASSIUM SERPL-SCNC: 3.8 MMOL/L (ref 3.5–5.3)
PR INTERVAL: 130 MS
PROT SERPL-MCNC: 8.1 G/DL (ref 6.4–8.2)
PROTHROMBIN TIME: 12.5 SECONDS (ref 11.6–14.5)
QRS AXIS: 67 DEGREES
QRSD INTERVAL: 104 MS
QT INTERVAL: 364 MS
QTC INTERVAL: 440 MS
RBC # BLD AUTO: 4.62 MILLION/UL (ref 3.88–5.62)
SODIUM SERPL-SCNC: 140 MMOL/L (ref 136–145)
T WAVE AXIS: 60 DEGREES
VENTRICULAR RATE: 88 BPM
WBC # BLD AUTO: 7.51 THOUSAND/UL (ref 4.31–10.16)

## 2022-01-14 PROCEDURE — 85025 COMPLETE CBC W/AUTO DIFF WBC: CPT | Performed by: PHYSICIAN ASSISTANT

## 2022-01-14 PROCEDURE — 70498 CT ANGIOGRAPHY NECK: CPT

## 2022-01-14 PROCEDURE — 96374 THER/PROPH/DIAG INJ IV PUSH: CPT

## 2022-01-14 PROCEDURE — 85610 PROTHROMBIN TIME: CPT | Performed by: PHYSICIAN ASSISTANT

## 2022-01-14 PROCEDURE — 93010 ELECTROCARDIOGRAM REPORT: CPT

## 2022-01-14 PROCEDURE — 85730 THROMBOPLASTIN TIME PARTIAL: CPT | Performed by: PHYSICIAN ASSISTANT

## 2022-01-14 PROCEDURE — 70496 CT ANGIOGRAPHY HEAD: CPT

## 2022-01-14 PROCEDURE — 99285 EMERGENCY DEPT VISIT HI MDM: CPT | Performed by: PHYSICIAN ASSISTANT

## 2022-01-14 PROCEDURE — 80053 COMPREHEN METABOLIC PANEL: CPT | Performed by: PHYSICIAN ASSISTANT

## 2022-01-14 PROCEDURE — 96375 TX/PRO/DX INJ NEW DRUG ADDON: CPT

## 2022-01-14 PROCEDURE — 84484 ASSAY OF TROPONIN QUANT: CPT | Performed by: PHYSICIAN ASSISTANT

## 2022-01-14 PROCEDURE — A9585 GADOBUTROL INJECTION: HCPCS | Performed by: PHYSICIAN ASSISTANT

## 2022-01-14 PROCEDURE — 99285 EMERGENCY DEPT VISIT HI MDM: CPT

## 2022-01-14 PROCEDURE — 72156 MRI NECK SPINE W/O & W/DYE: CPT

## 2022-01-14 PROCEDURE — 36415 COLL VENOUS BLD VENIPUNCTURE: CPT | Performed by: PHYSICIAN ASSISTANT

## 2022-01-14 PROCEDURE — 70553 MRI BRAIN STEM W/O & W/DYE: CPT

## 2022-01-14 PROCEDURE — G1004 CDSM NDSC: HCPCS

## 2022-01-14 PROCEDURE — 93005 ELECTROCARDIOGRAM TRACING: CPT

## 2022-01-14 RX ORDER — LORAZEPAM 2 MG/ML
1 INJECTION INTRAMUSCULAR ONCE
Status: COMPLETED | OUTPATIENT
Start: 2022-01-14 | End: 2022-01-14

## 2022-01-14 RX ORDER — DEXAMETHASONE SODIUM PHOSPHATE 4 MG/ML
10 INJECTION, SOLUTION INTRA-ARTICULAR; INTRALESIONAL; INTRAMUSCULAR; INTRAVENOUS; SOFT TISSUE ONCE
Status: COMPLETED | OUTPATIENT
Start: 2022-01-14 | End: 2022-01-14

## 2022-01-14 RX ORDER — LEVETIRACETAM 1000 MG/1
1000 TABLET ORAL 2 TIMES DAILY
Qty: 20 TABLET | Refills: 0 | Status: ON HOLD | OUTPATIENT
Start: 2022-01-14 | End: 2022-02-01

## 2022-01-14 RX ORDER — LEVETIRACETAM 250 MG/1
1000 TABLET ORAL ONCE
Status: COMPLETED | OUTPATIENT
Start: 2022-01-14 | End: 2022-01-14

## 2022-01-14 RX ORDER — DEXAMETHASONE SODIUM PHOSPHATE 4 MG/ML
4 INJECTION, SOLUTION INTRA-ARTICULAR; INTRALESIONAL; INTRAMUSCULAR; INTRAVENOUS; SOFT TISSUE EVERY 6 HOURS SCHEDULED
Status: DISCONTINUED | OUTPATIENT
Start: 2022-01-15 | End: 2022-01-14

## 2022-01-14 RX ORDER — AMLODIPINE BESYLATE 5 MG/1
5 TABLET ORAL DAILY
Status: ON HOLD | COMMUNITY
Start: 2021-12-28 | End: 2022-02-01 | Stop reason: SDUPTHER

## 2022-01-14 RX ORDER — DEXAMETHASONE SODIUM PHOSPHATE 4 MG/ML
4 INJECTION, SOLUTION INTRA-ARTICULAR; INTRALESIONAL; INTRAMUSCULAR; INTRAVENOUS; SOFT TISSUE EVERY 6 HOURS SCHEDULED
Status: DISCONTINUED | OUTPATIENT
Start: 2022-01-15 | End: 2022-01-15 | Stop reason: HOSPADM

## 2022-01-14 RX ORDER — DEXAMETHASONE 4 MG/1
4 TABLET ORAL EVERY 6 HOURS
Qty: 40 TABLET | Refills: 0 | Status: SHIPPED | OUTPATIENT
Start: 2022-01-14 | End: 2022-01-24

## 2022-01-14 RX ADMIN — IOHEXOL 85 ML: 350 INJECTION, SOLUTION INTRAVENOUS at 17:30

## 2022-01-14 RX ADMIN — DEXAMETHASONE SODIUM PHOSPHATE 10 MG: 4 INJECTION, SOLUTION INTRA-ARTICULAR; INTRALESIONAL; INTRAMUSCULAR; INTRAVENOUS; SOFT TISSUE at 19:06

## 2022-01-14 RX ADMIN — GADOBUTROL 8 ML: 604.72 INJECTION INTRAVENOUS at 20:35

## 2022-01-14 RX ADMIN — LORAZEPAM 1 MG: 2 INJECTION INTRAMUSCULAR; INTRAVENOUS at 19:27

## 2022-01-14 RX ADMIN — LEVETIRACETAM 1000 MG: 250 TABLET, FILM COATED ORAL at 22:40

## 2022-01-14 NOTE — ED PROVIDER NOTES
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, here complaining of R arm numbness x 3 weeks  Patient states the numbness feels more like a heaviness to the entirity of the right arm  He is also endorsing some weakness to the RUE, mostly in the shoulder, having a hard time lifting his arm  He denies loss of sensation  When it started he was scooping ice cream, he was able to continue to have a conversation with his wife, no slurred speech, no loss of balance, no changes to vision, no facial droop, no LE weakness/numbness/tingling, no other injury to the area  Patient is R hand dominant, he states its hard for him to do many daily activities, including play guitar  Patient states he has pinched a nerve in the past and it feels similar to that  Patient is currently denying dizziness, light headedness, headache, changes in vision, CP, SOB, palpitations, abdominal pain, N/V/D, no recent viral illness, no recent sick contacts, denies neck and back pain  When patient was initially diagnosed with RCC he was noted to have a brain stem lesion which could not be biopsied due to its location for which he had radiation to the cerebellum with presumption of possible RCC (per care everywhere resolved since 2014)  Patient recently got taken off of hospice 12/07/2021 after initially being placed on for decline marked progression (liver, peritoneum, mediastinal/hilar LN mets)  Patient was on hospice for 8 weeks, progressively improved and was d/c from hospice  Prior to Admission Medications   Prescriptions Last Dose Informant Patient Reported? Taking?    amLODIPine (NORVASC) 5 mg tablet 1/14/2022 at Unknown time  Yes Yes   Sig: Take 5 mg by mouth in the morning   oxyCODONE (ROXICODONE) 10 MG TABS Past Week at Unknown time  Yes Yes   Sig: Take 10 mg by mouth every 6 (six) hours as needed      Facility-Administered Medications: None       Past Medical History:   Diagnosis Date    Anxiety disorder     resolved 3/25/15    Arthritis     Chronic kidney disease     Chronic left-sided low back pain without sciatica 3/13/2019    H/O renal cell carcinoma     last assessed 1/10/18    Hyperlipidemia     Hypertension     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([address] )     resolved 11/29/14    Overweight     resolved 7/8/14    Solitary pulmonary nodule on lung CT     resolved 11/19/14       Past Surgical History:   Procedure Laterality Date    ANKLE FRACTURE SURGERY      closed tx of bimalleolar    COLONOSCOPY  04/23/2014    fiberoptic screening     CT NEEDLE BIOPSY RETROPERITONEUM  4/17/2019    NEPHRECTOMY      UMBILICAL HERNIA REPAIR      patient states this was at birth       Family History   Problem Relation Age of Onset    Diabetes Family         mellitus    Hypertension Family     Other Family         visual impairment     I have reviewed and agree with the history as documented  E-Cigarette/Vaping     E-Cigarette/Vaping Substances     Social History     Tobacco Use    Smoking status: Current Some Day Smoker    Smokeless tobacco: Never Used    Tobacco comment: cigars   Substance Use Topics    Alcohol use: Yes     Alcohol/week: 3 0 standard drinks     Types: 1 Glasses of wine, 1 Cans of beer, 1 Shots of liquor per week     Comment: social    Drug use: No       Review of Systems   Constitutional: Positive for activity change (due to RUE weakness)  Negative for chills, fatigue and fever  HENT: Negative for congestion, ear pain, rhinorrhea and sore throat  Eyes: Negative for photophobia, pain and visual disturbance  Respiratory: Negative for cough and shortness of breath  Cardiovascular: Negative for chest pain, palpitations and leg swelling     Gastrointestinal: Negative for abdominal pain, diarrhea, nausea and vomiting  Genitourinary: Negative for dysuria and hematuria  Musculoskeletal: Negative for arthralgias, back pain and neck pain  Skin: Negative for color change and rash  Neurological: Positive for weakness and numbness  Negative for dizziness, seizures, syncope, light-headedness and headaches  All other systems reviewed and are negative  Physical Exam  Physical Exam  Vitals and nursing note reviewed  Constitutional:       General: He is not in acute distress  Appearance: Normal appearance  He is well-developed and well-groomed  He is not ill-appearing  HENT:      Head: Normocephalic and atraumatic  Right Ear: External ear normal       Left Ear: External ear normal       Nose: Nose normal    Eyes:      General: No scleral icterus  Extraocular Movements: Extraocular movements intact  Conjunctiva/sclera: Conjunctivae normal    Cardiovascular:      Rate and Rhythm: Normal rate and regular rhythm  Pulses: Normal pulses  Heart sounds: Normal heart sounds  No murmur heard  No friction rub  No gallop  Pulmonary:      Effort: Pulmonary effort is normal       Breath sounds: No stridor  No wheezing, rhonchi or rales  Abdominal:      General: There is no distension  Musculoskeletal:         General: No deformity  Normal range of motion  Cervical back: Normal range of motion  No rigidity or tenderness  Skin:     Coloration: Skin is not jaundiced or pale  Findings: No lesion or rash  Neurological:      Mental Status: He is alert and oriented to person, place, and time  GCS: GCS eye subscore is 4  GCS verbal subscore is 5  GCS motor subscore is 6  Motor: Weakness (Strength: 3/5 weakness finger abduction, thumb abduction,  strength, 4/5 elbow extension and flexion, wrist extension and flexion on R    ) present  No atrophy        Coordination: Coordination abnormal (patient slower with alternating fingers to thumb of R hand, had to think about it compared to L)  Gait: Gait is intact  Comments: Sensation: is intact throughout  + 2 point tactile location  ROM: intact throughout  No trauma or injury to area  Psychiatric:         Behavior: Behavior normal  Behavior is cooperative           Vital Signs  ED Triage Vitals [01/14/22 1523]   Temperature Pulse Respirations Blood Pressure SpO2   98 2 °F (36 8 °C) 88 16 (!) 182/81 97 %      Temp Source Heart Rate Source Patient Position - Orthostatic VS BP Location FiO2 (%)   Oral Monitor Sitting Left arm --      Pain Score       No Pain           Vitals:    01/14/22 1523 01/14/22 1901 01/14/22 2212   BP: (!) 182/81 (!) 171/83 150/86   Pulse: 88 73 74   Patient Position - Orthostatic VS: Sitting Lying        Visual Acuity  Visual Acuity      Most Recent Value   L Pupil Size (mm) 3   R Pupil Size (mm) 3          ED Medications  Medications   dexamethasone (DECADRON) injection 4 mg (has no administration in time range)   iohexol (OMNIPAQUE) 350 MG/ML injection (SINGLE-DOSE) 85 mL (85 mL Intravenous Given 1/14/22 1730)   dexamethasone (DECADRON) injection 10 mg (10 mg Intravenous Given 1/14/22 1906)   LORazepam (ATIVAN) injection 1 mg (1 mg Intravenous Given 1/14/22 1927)   Gadobutrol injection (SINGLE-DOSE) SOLN 8 mL (8 mL Intravenous Given 1/14/22 2035)   levETIRAcetam (KEPPRA) tablet 1,000 mg (1,000 mg Oral Given 1/14/22 2240)       Diagnostic Studies  Results Reviewed     Procedure Component Value Units Date/Time    HS Troponin I 2hr [[id number]] Collected: 01/14/22 1927    Lab Status: Final result Specimen: Blood from Arm, Left Updated: 01/14/22 2004     hs TnI 2hr 16 ng/L      Delta 2hr hsTnI 0 ng/L     HS Troponin 0hr (reflex protocol) [[id number]]  (Normal) Collected: 01/14/22 1633    Lab Status: Final result Specimen: Blood from Arm, Left Updated: 01/14/22 1711     hs TnI 0hr 16 ng/L     Comprehensive metabolic panel [[id number]] Collected: 01/14/22 1633    Lab Status: Final result Specimen: Blood from Arm, Left Updated: 01/14/22 1658     Sodium 140 mmol/L      Potassium 3 8 mmol/L      Chloride 102 mmol/L      CO2 29 mmol/L      ANION GAP 9 mmol/L      BUN 17 mg/dL      Creatinine 0 88 mg/dL      Glucose 90 mg/dL      Calcium 9 0 mg/dL      AST 18 U/L      ALT 21 U/L      Alkaline Phosphatase 104 U/L      Total Protein 8 1 g/dL      Albumin 4 2 g/dL      Total Bilirubin 0 67 mg/dL      eGFR 96 ml/min/1 73sq m     Narrative:      National Kidney Disease Foundation guidelines for Chronic Kidney Disease (CKD):     Stage 1 with normal or high GFR (GFR > 90 mL/min/1 73 square meters)    Stage 2 Mild CKD (GFR = 60-89 mL/min/1 73 square meters)    Stage 3A Moderate CKD (GFR = 45-59 mL/min/1 73 square meters)    Stage 3B Moderate CKD (GFR = 30-44 mL/min/1 73 square meters)    Stage 4 Severe CKD (GFR = 15-29 mL/min/1 73 square meters)    Stage 5 End Stage CKD (GFR <15 mL/min/1 73 square meters)  Note: GFR calculation is accurate only with a steady state creatinine    Protime-INR [[id number]]  (Normal) Collected: 01/14/22 1633    Lab Status: Final result Specimen: Blood from Arm, Left Updated: 01/14/22 1658     Protime 12 5 seconds      INR 0 96    APTT [[id number]]  (Normal) Collected: 01/14/22 1633    Lab Status: Final result Specimen: Blood from Arm, Left Updated: 01/14/22 1658     PTT 29 seconds     CBC and differential [[id number]]  (Abnormal) Collected: 01/14/22 1633    Lab Status: Final result Specimen: Blood from Arm, Left Updated: 01/14/22 1644     WBC 7 51 Thousand/uL      RBC 4 62 Million/uL      Hemoglobin 14 6 g/dL      Hematocrit 44 2 %      MCV 96 fL      MCH 31 6 pg      MCHC 33 0 g/dL      RDW 13 3 %      MPV 10 4 fL      Platelets 259 Thousands/uL      nRBC 0 /100 WBCs      Neutrophils Relative 76 %      Immat GRANS % 0 %      Lymphocytes Relative 15 %      Monocytes Relative 8 %      Eosinophils Relative 1 %      Basophils Relative 0 %      Neutrophils Absolute 5 64 Thousands/µL Immature Grans Absolute 0 03 Thousand/uL      Lymphocytes Absolute 1 14 Thousands/µL      Monocytes Absolute 0 58 Thousand/µL      Eosinophils Absolute 0 09 Thousand/µL      Basophils Absolute 0 03 Thousands/µL                  MRI brain w wo contrast   Final Result by Jh Gilbert MD (01/14 2146)      Stable left parietal and left parietal temporal cystic lesions with significant surrounding vasogenic edema, likely to represent metastatic disease  Workstation performed: [id number]         MRI cervical spine w wo contrast   Final Result by Jh Gilbert MD (01/14 2159)         1  Disc and facet degenerative change throughout the cervical spine resulting in mild central canal stenosis  Right C6-7 severe neural foraminal narrowing  2   No evidence of epidural or paraspinal metastases  Workstation performed: [id number]         CTA head and neck with and without contrast   Final Result by Jh Gilbert MD (01/14 1818)         1  Two  rim-enhancing masses in the left parietal temporal region measuring 3 and 3 5 cm, each with significant surrounding vasogenic edema, likely to represent metastatic disease  Recommend MRI of the brain with gadolinium and neurosurgical    consultation  2   No significant mass effect or intracranial hemorrhage  3   No stenosis, dissection or occlusion of the carotid or vertebral arteries or major vessels of the [place]  The study was marked in [facility] for immediate notification                    Workstation performed: [id number]                    Procedures  ECG 12 Lead Documentation Only    Date/Time: 1/14/2022 4:40 PM  Performed by: Christelle Haynes PA-C  Authorized by: Christelle Haynes PA-C     Indications / Diagnosis:  Numbness  ECG reviewed by me, the ED Provider: yes    Patient location:  ED  Previous ECG:     Previous ECG:  Compared to current    Similarity:  No change  Interpretation:     Interpretation: normal    Rate:     ECG rate:  88    ECG rate assessment: normal    Rhythm:     Rhythm: sinus rhythm    Ectopy:     Ectopy: none    QRS:     QRS axis:  Normal    QRS intervals:  Normal  Conduction:     Conduction: normal    ST segments:     ST segments:  Normal  T waves:     T waves: normal               ED Course  ED Course as of 01/14/22 2241 Fri Jan 14, 2022 1652 Spoke with neurology about patient  Will place order for CTA head and neck and MRI Brain and C-Spine with contrast   1822 CTA head and neck with and without contrast  Interpretation: 1  Two rim-enhancing masses in the left parietal temporal region measuring 3 and 3 5 cm, each with significant surrounding vasogenic edema, likely to represent metastatic disease  Recommend MRI of the brain with gadolinium and neurosurgical   consultation  2   No significant mass effect or intracranial hemorrhage  3   No stenosis, dissection or occlusion of the carotid or vertebral arteries or major vessels of the [place]  1906 Dex 10mg IV given per neuro, will order 4mg q6h for 6h from now   2018 hs TnI 2hr: 16  0hr and 2hr trop the same, will d/c 4hr   2034 Spoke with critical care at [facility], patient to be transferred to [facility] with [address] consult  Will also start [address] ppx for sz precautions  2150 Discussed with patient and wife in depth about the findings on his CT and MRI scan  Patient at this time is declining to stay  Patient and wife are aware of risks of him leaving  Understand the vasogenic edema can lead to seizures, stroke and/or death  Patient and wife are willing to accept this risk and would like to go home  MDM  Number of Diagnoses or Management Options  Brain metastases 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, here complaining of R arm numbness x 3 weeks  Initial presentation with RCC assocaited with brain stem legion (resolved 2014)   Numbness feels like heaviness, associated with weakness, all other ROS negative (see HPI)  Exam sig for 3-4/5 strength in RUE, sensation intact, ROM intact  Discuss case with neurology for imaging suggestions  Discussed w/ Neuro: CTA head and neck with CTH, MRI brain and c-spine with gadolinium  CTH revealed two Two rim-enhancing masses in the left parietal temporal region measuring 3 and 3 5 cm, each with significant surrounding vasogenic edema, likely to represent metastatic disease  Will discuss about possible transfer  Patient to be transferred to [facility] with NSGY consult  Keppra started as sz ppx  Patient does not want to be transferred to hospitals at this time  Patient would like to go home, discuss with doctor at [facility] and make decisions from there  Patient understands why admission is necessary at this time but still would like to go home  Patient aware of risk of going home with vasogenic edema  Patient accepting of possible risks: death, seizure, or stroke  Will give patient Keppra for seizure ppx and decadron for vasogenic edema  Patient will follow up tomorrow with Doctor at [facility]  Patient aware of when to return to the ED  Patient and wife are in agreement of plan  Patient is stable upon d/c  All questions answered         Amount and/or Complexity of Data Reviewed  Clinical lab tests: ordered and reviewed  Tests in the radiology section of CPT®: ordered and reviewed  Review and summarize past medical records: yes  Discuss the patient with other providers: yes        Disposition  Final diagnoses:   Brain metastases ([facility] [address] )       Patient's Medications   Discharge Prescriptions    DEXAMETHASONE (DECADRON) 4 MG TABLET    Take 1 tablet (4 mg total) by mouth every 6 (six) hours for 10 days       Start Date: 1/14/2022 End Date: 1/24/2022       Order Dose: 4 mg       Quantity: 40 tablet    Refills: 0    LEVETIRACETAM (KEPPRA) 1000 MG TABLET    Take 1 tablet (1,000 mg total) by mouth 2 (two) times a day for 10 days Start Date: 1/14/2022 End Date: 1/24/2022       Order Dose: 1,000 mg       Quantity: 20 tablet    Refills: 0       No discharge procedures on file      PDMP Review     None          ED Provider  Electronically Signed by CLINT Chavez PA-C  01/14/22 8646

## 2022-01-15 NOTE — EMTALA/ACUTE CARE TRANSFER
[facility] [address]  [address] [facility] [address]  Dept: [phone]      EMTALA TRANSFER CONSENT    NAME Emilie Hickey   1965                              MRN [id number]    I have been informed of my rights regarding examination, treatment, and transfer   by Dr Deya Flynn DO    Benefits: Specialized equipment and/or services available at the receiving facility (Include comment)________________________    Risks: Potential for delay in receiving treatment,Potential deterioration of medical condition,Loss of IV,Increased discomfort during transfer,Possible worsening of condition or death during transfer      Consent for Transfer:  I acknowledge that my medical condition has been evaluated and explained to me by the emergency department physician or other qualified medical person and/or my attending physician, who has recommended that I be transferred to the service of  Accepting Physician: Dr Jess Leiva at [address], [address] : [place], [place]  The above potential benefits of such transfer, the potential risks associated with such transfer, and the probable risks of not being transferred have been explained to me, and I fully understand them  The doctor has explained that, in my case, the benefits of transfer outweigh the risks  I agree to be transferred  I authorize the performance of emergency medical procedures and treatments upon me in both transit and upon arrival at the receiving facility  Additionally, I authorize the release of any and all medical records to the receiving facility and request they be transported with me, if possible  I understand that the safest mode of transportation during a medical emergency is an ambulance and that the Hospital advocates the use of this mode of transport   Risks of traveling to the receiving facility by car, including absence of medical control, life sustaining equipment, such as oxygen, and medical personnel has been explained to me and I fully understand them  (FRANKIE CORRECT BOX BELOW)  [  ]  I consent to the stated transfer and to be transported by ambulance/helicopter  [  ]  I consent to the stated transfer, but refuse transportation by ambulance and accept full responsibility for my transportation by car  I understand the risks of non-ambulance transfers and I exonerate the Hospital and its staff from any deterioration in my condition that results from this refusal     X___________________________________________    DATE  22  TIME________  Signature of patient or legally responsible individual signing on patient behalf           RELATIONSHIP TO PATIENT_________________________          Provider Certification    NAME Kike Murdock  [facility] 1965                              MRN [id number]    A medical screening exam was performed on the above named patient  Based on the examination:    Condition Necessitating Transfer The encounter diagnosis was Brain metastases ([facility] [address] )      Patient Condition: The patient has been stabilized such that within reasonable medical probability, no material deterioration of the patient condition or the condition of the unborn child(jayce) is likely to result from the transfer    Reason for Transfer: Level of Care needed not available at this facility    Transfer Requirements: Elisabeth Melendez [address] [place] [address]   · Space available and qualified personnel available for treatment as acknowledged by    · Agreed to accept transfer and to provide appropriate medical treatment as acknowledged by       Dr Judie Copeland  · Appropriate medical records of the examination and treatment of the patient are provided at the time of transfer   [address] _______  · Transfer will be performed by qualified personnel from    and appropriate transfer equipment as required, including the use of necessary and appropriate life support measures  Provider Certification: I have examined the patient and explained the following risks and benefits of being transferred/refusing transfer to the patient/family:  General risk, such as traffic hazards, adverse weather conditions, rough terrain or turbulence, possible failure of equipment (including vehicle or aircraft), or consequences of actions of persons outside the control of the transport personnel,Unanticipated needs of medical equipment and personnel during transport,Risk of worsening condition      Based on these reasonable risks and benefits to the patient and/or the unborn child(jayce), and based upon the information available at the time of the patients examination, I certify that the medical benefits reasonably to be expected from the provision of appropriate medical treatments at another medical facility outweigh the increasing risks, if any, to the individuals medical condition, and in the case of labor to the unborn child, from effecting the transfer      X____________________________________________ DATE 01/14/22        TIME_______      ORIGINAL - SEND TO MEDICAL RECORDS   COPY - SEND WITH PATIENT DURING TRANSFER

## 2022-01-15 NOTE — DISCHARGE INSTRUCTIONS
Please return if symptoms worsen, if stroke like symptoms develop, if seizures develop  Please return if new symptoms develop  Call Dr  at [address] [place] [address] or tomorrow morning to follow up with scans and make plan  If you need to be admitted, go ahead to [facility]

## 2022-01-17 ENCOUNTER — TELEPHONE (OUTPATIENT)
Dept: FAMILY MEDICINE CLINIC | Facility: CLINIC | Age: 57
End: 2022-01-17

## 2022-01-17 NOTE — TELEPHONE ENCOUNTER
----- Message from Edwin Piper DO sent at 1/16/2022  8:35 PM EST -----  Schedule  Emergency room follow-up appointment

## 2022-01-26 ENCOUNTER — HOSPITAL ENCOUNTER (INPATIENT)
Facility: HOSPITAL | Age: 57
LOS: 7 days | Discharge: HOME | DRG: 054 | End: 2022-02-02
Attending: PHYSICAL MEDICINE & REHABILITATION | Admitting: PHYSICAL MEDICINE & REHABILITATION
Payer: COMMERCIAL

## 2022-01-26 DIAGNOSIS — I10 ESSENTIAL HYPERTENSION: ICD-10-CM

## 2022-01-26 DIAGNOSIS — Z91.89 AT RISK FOR STRESS ULCER: ICD-10-CM

## 2022-01-26 DIAGNOSIS — C79.31 BRAIN METASTASES (HCC): ICD-10-CM

## 2022-01-26 DIAGNOSIS — E87.1 HYPONATREMIA: ICD-10-CM

## 2022-01-26 DIAGNOSIS — K59.09 OTHER CONSTIPATION: ICD-10-CM

## 2022-01-26 DIAGNOSIS — C79.31 METASTATIC RENAL CELL CARCINOMA TO BRAIN (HCC): Primary | ICD-10-CM

## 2022-01-26 DIAGNOSIS — C64.9 SARCOMATOID RENAL CELL CARCINOMA (HCC): ICD-10-CM

## 2022-01-26 DIAGNOSIS — C64.9 METASTATIC RENAL CELL CARCINOMA TO BRAIN (HCC): Primary | ICD-10-CM

## 2022-01-26 PROBLEM — R13.10 DYSPHAGIA: Status: ACTIVE | Noted: 2022-01-26

## 2022-01-26 PROCEDURE — 99222 1ST HOSP IP/OBS MODERATE 55: CPT | Performed by: PHYSICAL MEDICINE & REHABILITATION

## 2022-01-26 PROCEDURE — 99254 IP/OBS CNSLTJ NEW/EST MOD 60: CPT | Performed by: INTERNAL MEDICINE

## 2022-01-26 RX ORDER — DEXAMETHASONE 4 MG/1
4 TABLET ORAL EVERY 12 HOURS SCHEDULED
Status: COMPLETED | OUTPATIENT
Start: 2022-01-29 | End: 2022-01-30

## 2022-01-26 RX ORDER — POLYETHYLENE GLYCOL 3350 17 G/17G
17 POWDER, FOR SOLUTION ORAL ONCE
Status: COMPLETED | OUTPATIENT
Start: 2022-01-26 | End: 2022-01-26

## 2022-01-26 RX ORDER — DEXAMETHASONE 0.5 MG/1
1 TABLET ORAL DAILY
Status: DISCONTINUED | OUTPATIENT
Start: 2022-02-05 | End: 2022-02-02 | Stop reason: HOSPADM

## 2022-01-26 RX ORDER — SENNA PLUS 8.6 MG/1
1 TABLET ORAL DAILY
Status: DISCONTINUED | OUTPATIENT
Start: 2022-01-26 | End: 2022-01-26

## 2022-01-26 RX ORDER — OXYCODONE HYDROCHLORIDE 10 MG/1
10 TABLET ORAL EVERY 4 HOURS PRN
Status: DISCONTINUED | OUTPATIENT
Start: 2022-01-26 | End: 2022-02-02 | Stop reason: HOSPADM

## 2022-01-26 RX ORDER — POLYETHYLENE GLYCOL 3350 17 G/17G
17 POWDER, FOR SOLUTION ORAL DAILY PRN
Status: DISCONTINUED | OUTPATIENT
Start: 2022-01-26 | End: 2022-02-02 | Stop reason: HOSPADM

## 2022-01-26 RX ORDER — SENNOSIDES 8.6 MG
2 TABLET ORAL DAILY
Status: DISCONTINUED | OUTPATIENT
Start: 2022-01-26 | End: 2022-02-02 | Stop reason: HOSPADM

## 2022-01-26 RX ORDER — DEXAMETHASONE 4 MG/1
2 TABLET ORAL EVERY 12 HOURS SCHEDULED
Status: COMPLETED | OUTPATIENT
Start: 2022-01-31 | End: 2022-02-01

## 2022-01-26 RX ORDER — LEVETIRACETAM 500 MG/1
1000 TABLET ORAL EVERY 12 HOURS SCHEDULED
Status: DISCONTINUED | OUTPATIENT
Start: 2022-01-26 | End: 2022-02-02 | Stop reason: HOSPADM

## 2022-01-26 RX ORDER — DEXAMETHASONE 4 MG/1
2 TABLET ORAL DAILY
Status: DISCONTINUED | OUTPATIENT
Start: 2022-02-02 | End: 2022-02-02 | Stop reason: HOSPADM

## 2022-01-26 RX ORDER — ONDANSETRON 4 MG/1
4 TABLET, ORALLY DISINTEGRATING ORAL EVERY 6 HOURS PRN
Status: DISCONTINUED | OUTPATIENT
Start: 2022-01-26 | End: 2022-02-02 | Stop reason: HOSPADM

## 2022-01-26 RX ORDER — OXYCODONE HYDROCHLORIDE 5 MG/1
5 TABLET ORAL EVERY 4 HOURS PRN
Status: DISCONTINUED | OUTPATIENT
Start: 2022-01-26 | End: 2022-02-02 | Stop reason: HOSPADM

## 2022-01-26 RX ORDER — BISACODYL 10 MG
10 SUPPOSITORY, RECTAL RECTAL DAILY PRN
Status: DISCONTINUED | OUTPATIENT
Start: 2022-01-26 | End: 2022-01-27

## 2022-01-26 RX ORDER — ACETAMINOPHEN 325 MG/1
650 TABLET ORAL EVERY 6 HOURS PRN
Status: ACTIVE | OUTPATIENT
Start: 2022-01-26 | End: 2022-01-28

## 2022-01-26 RX ORDER — DOCUSATE SODIUM 100 MG/1
100 CAPSULE, LIQUID FILLED ORAL 2 TIMES DAILY
Status: DISCONTINUED | OUTPATIENT
Start: 2022-01-26 | End: 2022-01-31

## 2022-01-26 RX ORDER — PANTOPRAZOLE SODIUM 40 MG/1
40 TABLET, DELAYED RELEASE ORAL
Status: DISCONTINUED | OUTPATIENT
Start: 2022-01-26 | End: 2022-02-02 | Stop reason: HOSPADM

## 2022-01-26 RX ORDER — DEXAMETHASONE 4 MG/1
6 TABLET ORAL EVERY 12 HOURS SCHEDULED
Status: COMPLETED | OUTPATIENT
Start: 2022-01-27 | End: 2022-01-28

## 2022-01-26 RX ORDER — AMLODIPINE BESYLATE 10 MG/1
10 TABLET ORAL DAILY
Status: DISCONTINUED | OUTPATIENT
Start: 2022-01-27 | End: 2022-02-02 | Stop reason: HOSPADM

## 2022-01-26 RX ORDER — DEXAMETHASONE 4 MG/1
8 TABLET ORAL 2 TIMES DAILY
Status: COMPLETED | OUTPATIENT
Start: 2022-01-26 | End: 2022-01-26

## 2022-01-26 RX ADMIN — DOCUSATE SODIUM 100 MG: 100 CAPSULE, LIQUID FILLED ORAL at 21:31

## 2022-01-26 RX ADMIN — POLYETHYLENE GLYCOL 3350 17 G: 17 POWDER, FOR SOLUTION ORAL at 13:11

## 2022-01-26 RX ADMIN — LEVETIRACETAM 1000 MG: 500 TABLET, FILM COATED ORAL at 21:31

## 2022-01-26 RX ADMIN — SENNOSIDES 17.2 MG: 8.6 TABLET, FILM COATED ORAL at 21:31

## 2022-01-26 RX ADMIN — DEXAMETHASONE 8 MG: 4 TABLET ORAL at 17:19

## 2022-01-26 NOTE — CONSULTS
[address]  1965, 64 y o  male MRN: [id number]  Unit/Bed#: -78 Encounter: [id number]  Primary Care Provider: Arjun Allen DO   Date and time admitted to hospital: 1/26/2022 11:15 AM    Inpatient consult to Internal Medicine  Consult performed by: CHETNA Figueroa  Consult ordered by: Tutu Harmon MD          Hyponatremia  Assessment & Plan  Na+ currently 133  Continue to trend with routine BMP  Sarcomatoid renal cell carcinoma (HCC)  Assessment & Plan  Hx of renal cell carcinoma s/p nephrectomy in 2009  Recurrence in 2019  Follows with [facility] as an outpatient  Recently placed in hospice in 10/2021 but was then discharged in 12/2021 due to improvement  Plans to discuss radiation treatment in 2 weeks as outpatient with Dr Kira Somers  Essential hypertension  Assessment & Plan  Continue home amlodipine 10mg daily  Monitor BP with routine VS   Follow-up with PCP as outpatient  Mixed hyperlipidemia  Assessment & Plan  Currently not taking a statin  Last lipid panel on 2/10/21: Cholesterol 236, Triglycerides 113, HDL 40, and   Currently encouraging diet changes  Recommend repeat lipid panel as outpatient  Hyperglycemia  Assessment & Plan  Slightly elevated BG (110s) at [facility]  Likely steroid induced  Last A1c in 09/2020 was 6 0  May benefit from repeat A1c  Continue to trend fasting BG  * Metastatic renal cell carcinoma to brain [facility])  Assessment & Plan  Hx of renal cell carcinoma in 2009 with recurrence in 2019  Developed RUE weakness  CT head on 1/20 showed L parietal lobe mass 3 1x2 6cm and additional mass 3 2x3 2cm along with 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  1/20 - L frontal and occipital craniotomy [facility]  Started on Decadron taper in acute setting - transition to 1mg daily on 2/5 and then follow-up with Neurosurgery  Continue Keppra 1000mg Q12  Seizure precautions  Neurovascular checks Q shift  Ensure adequate pain control  Monitor incision for s/s of infection  PT/OT/Speech therapies  Primary team following  History of Present Illness:    Sherin Bear  is a 64 y o  male with a PMH of renal cell carcinoma s/p nephrectomy in 2009 with recurrence in 2009, dyslipidemia, and HTN who originally presented to [facility] on 1/20/2022 after developing RUE weakness  CT scan showed multiple lesions in the brain with L parietal lobe masses measuring 3 1x2 6cm and 3 2x3 2cm along with chronic L cerebellar metastasis  Patient underwent L frontal and occipital craniotomy with stereotactic resection on 1/20/2022 performed by Dr Isaiah Rosado  Started on decadron taper post-operatively to be transitioned to 1mg daily on 2/5  Will then require further instructions from Neurosurgery on maintaining dose  Plans to follow-up with Dr Dede Cordova (Oncology at [facility]) in 2 weeks as outpatient to discuss radiation treatment options  Patient admitted to [address] [place] [address] on 1/26/2022; we are consulted for medical clearance  Pt examined while pt sitting in bed in pt room  Currently complaining of 2/10, aching/throbbing pain to L craniotomy incision  Has not taken any Tylenol to help with the pain and would like to avoid at this time  Encouraged to ask for Tylenol if pain becomes worse  Denies any headaches, lightheadedness, dizziness, or balance disturbances  Noted R sided weakness prior to going to the hospital but has improved greatly since surgery  Does admit to minimal lower extremity weakness that started a few days ago in the acute setting  Appears strong during examination  Feels that it could be weaker due to not getting OOB the past few days  Reviewed medications and medical hx - only takes amlodipine 10mg daily at home  Lives at home with his wife and children  No assistive devices used at home  Currently working as a   Has no other concerns or complaints at this time  Review of Systems:    Review of Systems   Constitutional: Negative for appetite change, chills, fatigue and fever  Eyes: Negative for visual disturbance  Respiratory: Negative for cough and shortness of breath  Cardiovascular: Negative for chest pain, palpitations and leg swelling  Gastrointestinal: Positive for constipation  Negative for abdominal distention, abdominal pain, diarrhea, nausea and vomiting  LBM 4 days ago, denies abdominal pain  Passing gas  Usually has a BM daily at home  Genitourinary: Negative for difficulty urinating, dysuria, frequency, hematuria and urgency  Musculoskeletal: Negative for arthralgias and back pain  Neurological: Positive for headaches (L craniotomy site pain, aching/throbbing, 2/10)  Negative for dizziness, tremors, seizures, facial asymmetry, speech difficulty, weakness, light-headedness and numbness  Psychiatric/Behavioral: Negative for sleep disturbance  Past Medical and Surgical History:     Past Medical History:   Diagnosis Date    Anxiety disorder     resolved 3/25/15    Arthritis     Chronic kidney disease     Chronic left-sided low back pain without sciatica 3/13/2019    H/O renal cell carcinoma     last assessed 1/10/18    Hyperlipidemia     Hypertension     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([facility] [address] )     resolved 11/29/14    Overweight     resolved 7/8/14    Solitary pulmonary nodule on lung CT     resolved 11/19/14       Past Surgical History:   Procedure Laterality Date    ANKLE FRACTURE SURGERY      closed tx of bimalleolar    COLONOSCOPY  04/23/2014    fiberoptic screening     CT NEEDLE BIOPSY RETROPERITONEUM  4/17/2019    NEPHRECTOMY      UMBILICAL HERNIA REPAIR      patient states this was at birth       Meds/Allergies:    all medications and allergies reviewed    Allergies:    Allergies   Allergen Reactions    Nifedipine Other (See Comments) [facility] - [id number]: flushing; takes amlodipine at home without a problem   Sulfa Antibiotics Hives       Social History:     Marital Status: /Civil Union    Substance Use History:   Social History     Substance and Sexual Activity   Alcohol Use Yes    Alcohol/week: 3 0 standard drinks    Types: 1 Glasses of wine, 1 Cans of beer, 1 Shots of liquor per week    Comment: social     Social History     Tobacco Use   Smoking Status Current Some Day Smoker    Types: Cigars   Smokeless Tobacco Never Used   Tobacco Comment    cigars     Social History     Substance and Sexual Activity   Drug Use No       Family History:  Reviewed    Physical Exam:     Vitals:   Blood Pressure: 147/80 (01/26/22 1100)  Pulse: 56 (01/26/22 1100)  Temperature: 98 2 °F (36 8 °C) (01/26/22 1100)  Temp Source: Tympanic (01/26/22 1100)  Respirations: 16 (01/26/22 1100)  Height: 6' (182 9 cm) (01/26/22 1144)  Weight - Scale: 76 kg (167 lb 8 8 oz) (01/26/22 1144)  SpO2: 99 % (01/26/22 1100)    Physical Exam  Vitals and nursing note reviewed  Constitutional:       Appearance: Normal appearance  HENT:      Head: Normocephalic and atraumatic  Neck:      Vascular: No carotid bruit  Cardiovascular:      Rate and Rhythm: Normal rate and regular rhythm  Pulses: Normal pulses  Heart sounds: Normal heart sounds  No murmur heard  No friction rub  Pulmonary:      Effort: Pulmonary effort is normal  No respiratory distress  Breath sounds: Normal breath sounds  No wheezing or rhonchi  Abdominal:      General: Abdomen is flat  Bowel sounds are normal  There is no distension  Palpations: Abdomen is soft  Tenderness: There is no abdominal tenderness  Musculoskeletal:      Cervical back: Normal range of motion and neck supple  Right lower leg: No edema  Left lower leg: No edema  Skin:     General: Skin is warm and dry  Comments: L craniotomy incision covered with DSD and Tegaderm  Dressing CDI  Neurological:      Mental Status: He is alert and oriented to person, place, and time  Motor: No weakness  Psychiatric:         Mood and Affect: Mood normal          Behavior: Behavior normal            Additional Data:     Labs and imaging reviewed  EKG Reviewed - last EKG on 1/14 showed NSR     M*Modal software was used to dictate this note  It may contain errors with dictating incorrect words or incorrect spelling  Please contact the provider directly with any questions

## 2022-01-26 NOTE — TREATMENT PLAN
Individualized Plan of [address]  64 y o  male MRN: [id number]  Unit/Bed#: -00 Encounter: [id number]     PATIENT INFORMATION  ADMISSION DATE: 1/26/2022 11:15 AM ROYAL CATEGORY:Brain Dysfunction:  02 1  Non-Traumatic   ADMISSION DIAGNOSIS: Malignant neoplasm of brain ([facility] [address] ) [C71 9]  EXPECTED LOS: 2 weeks     MEDICAL/FUNCTIONAL PROGNOSIS  Based on my assessment of the patient's medical conditions and current functional status, the prognosis for attaining medical and functional goals or the IRF stay is:  Good    Medical Goals: Patient will be able to manage medical conditions and comorbid conditions with medications and follow up upon completion of rehab program     1  Adequate pain control  2  Prevention of VTE  3  Adequate secretion management, and monitoring of respiratory status  4  Bowel/bladder management  5  Maintain appropriate nutrition and hydration for healing and hemodynamic stability  6  Emotional adaptation to impairment  7  Monitor for polypharmacy  8  Fall prevention  9  Patient and family caregiver training/education  10  Skin protection and prevention of pressure injury  11  Incisional care to prevent infection/dehiscence  12  Prevention of delirium  13  Monitoring neuro-status and sequelae of brain injury  14  Management of leukocytosis and hyponatremia         ANTICIPATED DISCHARGE DISPOSITION AND SERVICES  COMMUNITY SETTING: Home - independent/modified independent    ANTICIPATED FOLLOW-UP SERVICE:   Outpatient Therapy Services: PT, OT and SLP        DISCIPLINE SPECIFIC PLANS:  Required Disciplines & Services: Rehabillitation Nursing and Psychology    REQUIRED THERAPY:  Therapy Hours per Day Days per Week Total Days   Physical Therapy 1 5-6 5-6   Occupational Therapy 1 5-6 5-6   Speech/Language Therapy 1 3-5 3-5   NOTE: Additional therapy time(s) or changes to allocation of therapies as appropriate to meet patient needs and to achieve functional goals      Patient will either participate in above therapy regimen or participate in 900 minutes of therapy within 7 day week consisting of PT, OT and SLP due to the following medical procedure/condition:Brain Dysfunction:  02 1  Non-Traumatic    ANTICIPATED FUNCTIONAL OUTCOMES:  ADL:   mod Ind with LRAD   Bladder/Bowel:   mod Ind   Transfers:   mod Ind with LRAD   Locomotion:   mod Ind with LRAD   Cognitive:   goal is modified Ind      DISCHARGE PLANNING NEEDS  Equipment needs: Discharge needs to be reviewed with team      REHAB ANTICIPATED PARTICIPATION RESTRICTIONS:  None

## 2022-01-26 NOTE — H&P
PHYSICAL MEDICINE AND REHABILITATION H&P/ADMISSION NOTE  Abe Nava  64 y o  male MRN: [id number]  Unit/Bed#: -01 Encounter: [id number]     Rehab Diagnosis: Impairment of mobility, safety, Activities of Daily Living (ADLs), and cognitive/communication skills due to Brain Dysfunction:  02 1  Non-Traumatic   - Patient with non-traumatic brain dysfunction 2/2 to brain mets - primary cause of his impairment with R sided weakness, improving impulsivity/attention, anomic aphasia (improving), impaired motor planning/coordination on the R  Imaging with 2 L cerebral and known L cerebellar masses with surrounding edema, and some hemorrhage in area of parietal/occipital mass  History of Present Illness:   Abe Nava  is a 64 y o  male with medical history of 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 (s/p XRT to cerebellar lesion, L nephrectomy in 2009), HTN who presented to [facility] for R sided weakness  MRI brain showed brain mets on the L, and he had a frontal and occipital craniotomy on the L for biopsy on 1/20 with Dr Ky Bianchi  Post-op he was started on high dose steroid taper  Radiation oncology was consulted for post-op SRT - they will plan to have him follow-up in 2 weeks with Dr Eddie Brandt to discuss next steps  He was originally diagnosed in 2009, had a nephrectomy, and had a recurrence in 2019  He has had metastectomy for retroperitoneal recurrence, and has been on ipi/nivo, maintenance nivo, cabozantinib, pembrolizumab, and levatinib  He was briefly in hospice in 10/2021, but after doing better was transferred out in December 2021  He has had radiation to para-aortic lymphnode in 2021, and also to a cerebellar lesion in 2009  Of note he is claustrophobic, and needs pre-medication prior to any MRI  He was admitted to the [facility] on 1/26  Subjective:   Feels better every day  Last night finally crashed and had a good night of sleep   Denies any double/blurry vision - mostly complaints are R sided weakness  He denies any difficulty swallowing, but is on a modified diet  Denies any CP, SOB, fevers, chills, N/V, abdominal pain  Head pain is minor and localized  He has no issues with urination, but has been constipated now for 4 days  He is passing gas and tolerating PO  Review of Systems: A 10-point review of systems was performed  Negative except as listed above  Plan:     * Metastatic renal cell carcinoma to brain [facility])  Assessment & Plan  Originally diagnosed in 2009, with L cerebellar mets (s/p radiation) at that time  Had nephrectomy in 2009  Recurrence in 2019  Has had multiple types of systemic therapy  Now with R sided weakness, impaired motor planning/coordination, 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, with cerebral edema with midline shift, parieto/occipital hemorrhage  S/p biopsy on 1/20  Has f/u with Rad Onc in about 2 weeks with plan to discuss next steps at that point   - Closely monitor neuro status  - On decadron taper as prescribed by his team at [facility] 1000mg BID  - Seizure precautions, Delirium precautions   - PT/OT/SLP 3-5 hours a day, 5-7 days/week  - Outpatient f/u with NSx, Radiation Oncology, and Medical Oncology  Dysphagia  Assessment & Plan  Admitted on Dental Soft/Thins diet  Consulted SLP for eval  1/23 note did say he could advance to Reg/Thins, but has not yet been done  Hyponatremia  Assessment & Plan  Likely related to brain mets and recent surgery  Not on FR at this time  Recheck BMP in AM and trend  Sarcomatoid renal cell carcinoma ([facility] [address] )  Assessment & Plan  See "metastatic RCC to brain"     Essential hypertension  Assessment & Plan  Home: Amlodipine 5mg daily  Here: Amlodipine 10mg daily  Monitor BP and adjust as appropriate     IM consulted and management at their discretion    Mixed hyperlipidemia  Assessment & Plan  Not on statin at this time  Outpatient f/u with PCP    Hyperglycemia  Assessment & Plan  Likely 2/2 steroids  Very slightly elevated at [address] A1C 6  Trend fasting BG  Not on CDI at this time  IM consulted  Health Maintenance  #Delirium/Sleep: At risk, has been appropriate  Sleep at times is difficult    -Overstimulation precautions, frequent re-orientation, re-direction, re-assurance  -Sleep log and agitation monitoring if needed  -Optimize sleep-wake cycle  -Limit sedating medications when possible  - Ensure optimal management electrolytes, nutrition, and hydration  - Ensure optimal bowel/bladder management  - Ensure optimal pain management   -Patient/family/caregiver education and training   -Monitor for need for 1:1   -Fall precautions with frequent rounding; proactive toileting program, patient should not be unattended in bathroom     #Pain: Tylenol PRN, Oxycodone 5-10mg PRN  At home on Oxycodone PRN  #Bowel: Last BM at least 4 days prior  Giving miralax today  Docusate/Senna scheduled  Added PRN suppository  #Bladder: Voiding and continent  #Skin/Pressure Injury Prevention: Turn Q2hr in bed, with weight shifts [id number] in wheelchair  Float heels in bed  #DVT Prophylaxis: Lovenox, SCDs  #GI Prophylaxis: Protonix  #Code Status:  Full Code  #FEN:  Dental/Thins  #Dispo:  ELOS 2 weeks with plan to discharge home to supportive family  2 MANUEL, full flight of stairs to bedroom/bathroom  Goal is modified Ind with mobility and ADLs    Drug regimen reviewed, all potential adverse effects identified and addressed:    Scheduled Meds:  Current Facility-Administered Medications   Medication Dose Route Frequency Provider Last Rate    acetaminophen  650 mg Oral Q6H PRN MD Pacheco [place] ON 1/27/2022] amLODIPine  10 mg Oral Daily Feliberto Harrison MD      bisacodyl  10 mg Rectal Daily PRN Feliberto Harrison MD      [START ON 2/5/2022] dexamethasone  1 mg Oral Daily Feliberto Harrison MD      [START ON 1/31/2022] dexamethasone  2 mg Oral Q12H Kt Weiner MD      [facility] ON 2/2/2022] dexamethasone  2 mg Oral Daily Sravan Johnson MD      [START ON 1/29/2022] dexamethasone  4 mg Oral Q12H [address] Sravan Johnson MD      [START ON 1/27/2022] dexamethasone  6 mg Oral Q12H [address] Sravan Johnson MD      dexamethasone  8 mg Oral BID Sravan Johnson MD      DSS  100 mg Oral BID Sravan Johnson MD     [facility] 1/[facility]] enoxaparin  40 mg Subcutaneous Daily Sravan Johnson MD      levETIRAcetam  1,000 mg Oral Q12H [address] Sravan Johnson MD      ondansetron  4 mg Oral Q6H PRN Sravan Johnson MD      oxyCODONE  10 mg Oral Q4H PRN Sravan Johnson MD      oxyCODONE  5 mg Oral Q4H PRN Sravan Johnson MD      pantoprazole  40 mg Oral Early Morning Sravan Johnson MD      polyethylene glycol  17 g Oral Daily PRN Sravan Johnson MD      senna  1 tablet Oral Daily Sravan Johnson MD          Restrictions include:  Seizure precautions Fall precautions      Functional History/Home Set-up - Prior to Admission:      Functional Status: Patient was independent with mobility/ambulation, transfers, ADL's, IADL's  Lives with his wife, 21yo son, and daughter Brooke Bhandari) comes to visit on weekends  3 SH  Bed/bath on 2nd floor  2 MANUEL  Worked as a  for a car dealership  Functional Status Upon Admission to [facility]:  Mobility: Ramiro x2 for ambulation with HHA x2  4 steps  Transfers: Ramiro x2  ADLs:  Sup eating/grooming, Ramiro bathing, Ramiro UB/LB dressing, Dep for toileting, Ramiro x2 toilet transfers      Physical Exam:    /80 (BP Location: Left arm)   Pulse 56   Temp 98 2 °F (36 8 °C) (Tympanic)   Resp 16   Ht 6' (1 829 m)   Wt 76 kg (167 lb 8 8 oz)   SpO2 99%   BMI 22 72 kg/m²     Gen: No acute distress, Well-nourished, well-appearing  HEENT: Moist mucus membranes, Normocephalic/Atraumatic  L craniotomy site with dressing C/D/I  Cardiovascular: Regular rate, rhythm, S1/S2   Distal pulses palpable  Heme/Extr: No edema  Pulmonary: Non-labored breathing  Lungs CTAB  : No elizabeth  GI: Soft, non-tender, non-distended  BS+  MSK: ROM is WFL in all extremities  No effusions or deformities  Bulk is symmetric  See below for MMT scores  Integumentary: Skin is warm, dry  Neuro: AAOx3, CN 2-12 intact  Sensation intact to light touch throughout  Speech with some anomia  Otherwise pretty fluent, able to repeat  Comprehension is good  Decent attention, maybe slightly impulsive  Appropriate to questioning  Tone is normal  Ataxia/dysmetria noted on exam  Motor planning difficulties noted as well  MMT:   Strength:   Right  Left  Site  Right  Left  Site    3+ 5  S Ab: Shoulder Abductors  3 5  HF: Hip Flexors    4- 5  EF: Elbow Flexors  4  5 KF: Knee Flexors    4-  5  EE: Elbow Extensors  4  5  KE: Knee Extensors    4  5  WE: Wrist Extensors  4 5  DR: Dorsi Flexors    4  5  FF: Finger Flexors  4  5  PF: Plantar Flexors    3  5  HI: Hand Intrinsics  4  5  EHL: Extensor Hallucis Longus   Psych: Normal mood and affect  Laboratory:          Invalid input(s): PLTCT        Invalid input(s): CA         Wt Readings from Last 1 Encounters:   01/14/22 81 8 kg (180 lb 5 4 oz)     Estimated body mass index is 25 15 kg/m² as calculated from the following:    Height as of 1/14/22: 5' 11" (1 803 m)  Weight as of 1/14/22: 81 8 kg (180 lb 5 4 oz)  Imaging: reviewed   1/20 CTH:  Expected post-op changes including pneumocephalus and mass effect on the frontal lobes  1/20 CTH: 2 L cerebral and known L cerebellar masses with surrounding edema/mass effect with minimal hemorrhage in the L parietal occipital mass  No large territory infarct or midline shift  1/22 MRI Brain: Expected operative changes from interval resection of a L frontal and L temporoparietal tumor in keeping with metastatic disease  Persistent vasogenic edema and mass effect with rightward midline shift of 5mm, redemonstration of L cerebellar mets measuring up to 5mm      Rehabilitation Prognosis: good Tolerance for three hours of therapy a day: good     Family/Patient Goals:  Patient/family's goals: Return to previous home/apartment  Patient will receive PT, OT, and ST 60 minutes each per day, five days per week to achieve rehab goals or participate in 900 minutes of therapy within a 7 day week period  Mobility Goals: Modified [place]  Transfer Goals: Modified [place]  Activities of Daily Living (ADLs) Goals: Modified [place]    Discharge Planning:  Rehabilitation and discharge goals discussed with the patient and/or family  Case Managment and Social Work to review patient/family resources and to coordinate Discharge Planning  Estimated length of stay: 2 weeks    Patient and Family Education and Training:  Rehabilitation and discharge goals discussed with the patient and/or family  Patient/family education/training needs to be discussed in weekly team meeting      Equipment/DME needs: Therapy teams to assess and evaluate for additional equipment/DME needs throughout rehabilitation stay    Past Medical History:   Past Surgical History:   Family History:   Social history:   Past Medical History:   Diagnosis Date    Anxiety disorder     resolved 3/25/15    Arthritis     Chronic kidney disease     Chronic left-sided low back pain without sciatica 3/13/2019    H/O renal cell carcinoma     last assessed 1/10/18    Hyperlipidemia     Hypertension     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([facility] [address] )     resolved 11/29/14    Overweight     resolved 7/8/14    Solitary pulmonary nodule on lung CT     resolved 11/19/14    Past Surgical History:   Procedure Laterality Date    ANKLE FRACTURE SURGERY      closed tx of bimalleolar    COLONOSCOPY  04/23/2014    fiberoptic screening     CT NEEDLE BIOPSY RETROPERITONEUM  4/17/2019    NEPHRECTOMY      UMBILICAL HERNIA REPAIR      patient states this was at birth     Family History   Problem Relation Age of Onset    Diabetes Family         mellitus    Hypertension Family     Other Family         visual impairment      Social History     Socioeconomic History    Marital status: /Civil Union     Spouse name: Not on file    Number of children: Not on file    Years of education: Not on file    Highest education level: Not on file   Occupational History    Not on file   Tobacco Use    Smoking status: Current Some Day Smoker    Smokeless tobacco: Never Used    Tobacco comment: cigars   Substance and Sexual Activity    Alcohol use: Yes     Alcohol/week: 3 0 standard drinks     Types: 1 Glasses of wine, 1 Cans of beer, 1 Shots of liquor per week     Comment: social    Drug use: No    Sexual activity: Not on file   Other Topics Concern    Not on file   Social History Narrative    Not on file     Social Determinants of Health     Financial Resource Strain: Not on file   Food Insecurity: Not on file   Transportation Needs: Not on file   Physical Activity: Not on file   Stress: Not on file   Social Connections: Not on file   Intimate Partner Violence: Not on file   Housing Stability: Not on file          Current Medical Diagnosis Allergies   Patient Active Problem List   Diagnosis    Hyperglycemia    Mixed hyperlipidemia    Essential hypertension    Osteoarthritis of left knee    Vitamin D deficiency    Left hip pain    Weight loss, abnormal    Sarcomatoid renal cell carcinoma (HCC)    Allergies   Allergen Reactions    Nifedipine      Annotation - [id number]: flushing    Sulfa Antibiotics Hives           Medical Necessity Criteria for [facility] Admission: Will required close monitoring of his neuro status given recent diagnosis of new brain mets, cerebral edema, hemorrhage, and risk of seizures  He is at risk for aspiration with dysphagia on a modified diet, he has hypertension, cancer related pain, and constipation  He has leukocytosis likely related to his steroids, and hyponatremia  Monitor for sequelae of brain injury   In addition, the preadmission screen, post-admission physical evaluation, overall plan of care and admissions order demonstrate a reasonable expectation that the following criteria were met at the time of admission to the [facility]  1  The patient requires active and ongoing therapeutic intervention of multiple therapy disciplines (physical therapy, occupational therapy, speech-language pathology, or prosthetics/orthotics), one of which is physical or occupational therapy  2  Patient requires an intensive rehabilitation therapy program, as defined in Chapter 1, section 110 2 2 of the CMS Medicare Policy Manual  This intensive rehabilitation therapy program will consist of at least 3 hours of therapy per day at least 5 days per week or at least 15 hours of intensive rehabilitation therapy within a 7 consecutive day period, beginning with the date of admission to the [facility]  3  The patient is reasonably expected to actively participate in, and benefit significantly from, the intensive rehabilitation therapy program as defined in Chapter 1, section 110 2 2 of the CMS Medicare Policy Manual at this time of admission to the [facility]  He can reasonably be expected to make measurable improvement (that will be of practical value to improve the patients functional capacity or adaptation to impairments) as a result of the rehabilitation treatment, as defined in section 110 3, and such improvement can be expected to be made within the prescribed period of time  As noted in the CMS Medicare Policy Manual, the patient need not be expected to achieve complete independence in the domain of self-care nor be expected to return to his or her prior level of functioning in order to meet this standard  4  The patient must require physician supervision by a rehabilitation physician   As such, a rehabilitation physician will conduct face-to-face visits with the patient at least 3 days per week throughout the patients stay in the [facility] to assess the patient both medically and functionally, as well as to modify the course of treatment as needed to maximize the patients capacity to benefit from the rehabilitation process  5  The patient requires an intensive and coordinated interdisciplinary approach to providing rehabilitation, as defined in Chapter 1, section 110 2 5 of the CMS Medicare Policy Manual  This will be achieved through periodic team conferences, conducted at least once in a 7-day period, and comprising of an interdisciplinary team of medical professionals consisting of: a rehabilitation physician, registered nurse,  and/or , and a licensed/certified therapist from each therapy discipline involved in treating the patient  Nic Lebron MD  Physical Medicine and Rehabilitation    ** Please Note: Fluency Direct voice to text software may have been used in the creation of this document   **

## 2022-01-26 NOTE — ASSESSMENT & PLAN NOTE
Hx of renal cell carcinoma in 2009 with recurrence in 2019  Developed RUE weakness  CT head on 1/20 showed L parietal lobe mass 3 1x2 6cm and additional mass 3 2x3 2cm along with 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  1/20 - L frontal and occipital craniotomy performed by Dr David Bautista [facility])  Started on Decadron taper in acute setting - transition to 1mg daily on 2/5 and then follow-up with Neurosurgery  Continue Keppra 1000mg Q12  Seizure precautions  Neurovascular checks Q shift  Ensure adequate pain control  Monitor incision for s/s of infection  PT/OT/Speech therapies  Primary team following

## 2022-01-26 NOTE — ASSESSMENT & PLAN NOTE
Slightly elevated BG (110s) at [facility]  Likely steroid induced  Last A1c in 09/2020 was 6 0  May benefit from repeat A1c  Continue to trend fasting BG

## 2022-01-26 NOTE — ASSESSMENT & PLAN NOTE
Likely 2/2 steroids  Very slightly elevated at [address] [facility] A1C 6 - recheck as per IM - 5 5   Trend fasting BG  Not on CDI at this time  IM consulted

## 2022-01-26 NOTE — ASSESSMENT & PLAN NOTE
Home: Amlodipine 5mg daily  Here: Amlodipine 10mg daily  Monitor BP and adjust as appropriate     IM consulted and management at their discretion

## 2022-01-26 NOTE — ASSESSMENT & PLAN NOTE
Likely related to brain mets and recent surgery  Placing on fluid restriction 1800mL  Recheck BMP and osm on 1/28     - Na stable on 1/31, osm normal    - Continue fluid restriction     - Repeat BMP in 1 week with PCP

## 2022-01-26 NOTE — ASSESSMENT & PLAN NOTE
Hx of renal cell carcinoma s/p nephrectomy in 2009  Recurrence in 2019  Follows with [facility] as an outpatient  Recently placed in hospice in 10/2021 but was then discharged in 12/2021 due to improvement  Plans to discuss radiation treatment in 2 weeks as outpatient with Dr Scott Urbina

## 2022-01-26 NOTE — ASSESSMENT & PLAN NOTE
Currently not taking a statin  Last lipid panel on 2/10/21: Cholesterol 236, Triglycerides 113, HDL 40, and   Currently encouraging diet changes  Recommend repeat lipid panel as outpatient

## 2022-01-26 NOTE — ASSESSMENT & PLAN NOTE
Originally diagnosed in 2009, with L cerebellar mets (s/p radiation) at that time  Had nephrectomy in 2009  Recurrence in 2019  Has had multiple types of systemic therapy  Now with R sided weakness, impaired motor planning/coordination, 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, with cerebral edema with midline shift, parieto/occipital hemorrhage  S/p biopsy on 1/20  Has f/u with Rad Onc in about 2 weeks with plan to discuss next steps at that point   - Closely monitor neuro status  - On decadron taper as prescribed by his team at [facility] 1000mg BID  - Seizure precautions, Delirium precautions   - PT/OT/SLP 3-5 hours a day, 5-7 days/week     - Outpatient f/u with NSx, Radiation Oncology, and Medical Oncology     - Has Radiation Oncology appt 2/1, and NSx appointment 2/4 at 12:45pm

## 2022-01-27 LAB
ANION GAP SERPL CALCULATED.3IONS-SCNC: 1 MMOL/L (ref 5–14)
BASOPHILS # BLD AUTO: 0 THOUSANDS/ΜL (ref 0–0.1)
BASOPHILS NFR BLD AUTO: 0 % (ref 0–1)
BUN SERPL-MCNC: 29 MG/DL (ref 5–25)
CALCIUM SERPL-MCNC: 8.5 MG/DL (ref 8.4–10.2)
CHLORIDE SERPL-SCNC: 104 MMOL/L (ref 97–108)
CO2 SERPL-SCNC: 26 MMOL/L (ref 22–30)
CREAT SERPL-MCNC: 0.68 MG/DL (ref 0.7–1.5)
EOSINOPHIL # BLD AUTO: 0 THOUSAND/ΜL (ref 0–0.4)
EOSINOPHIL NFR BLD AUTO: 0 % (ref 0–6)
ERYTHROCYTE [DISTWIDTH] IN BLOOD BY AUTOMATED COUNT: 13.6 %
GFR SERPL CREATININE-BSD FRML MDRD: 106 ML/MIN/1.73SQ M
GLUCOSE P FAST SERPL-MCNC: 122 MG/DL (ref 70–99)
GLUCOSE SERPL-MCNC: 106 MG/DL (ref 65–140)
GLUCOSE SERPL-MCNC: 122 MG/DL (ref 70–99)
HCT VFR BLD AUTO: 39.2 % (ref 41–53)
HGB BLD-MCNC: 13.1 G/DL (ref 13.5–17.5)
LYMPHOCYTES # BLD AUTO: 0.6 THOUSANDS/ΜL (ref 0.5–4)
LYMPHOCYTES NFR BLD AUTO: 6 % (ref 25–45)
MCH RBC QN AUTO: 30.3 PG (ref 26–34)
MCHC RBC AUTO-ENTMCNC: 33.3 G/DL (ref 31–36)
MCV RBC AUTO: 91 FL (ref 80–100)
MONOCYTES # BLD AUTO: 0.4 THOUSAND/ΜL (ref 0.2–0.9)
MONOCYTES NFR BLD AUTO: 5 % (ref 1–10)
NEUTROPHILS # BLD AUTO: 7.8 THOUSANDS/ΜL (ref 1.8–7.8)
NEUTS SEG NFR BLD AUTO: 89 % (ref 45–65)
PLATELET # BLD AUTO: 168 THOUSANDS/UL (ref 150–450)
PMV BLD AUTO: 7.9 FL (ref 8.9–12.7)
POTASSIUM SERPL-SCNC: 4.5 MMOL/L (ref 3.6–5)
RBC # BLD AUTO: 4.31 MILLION/UL (ref 4.5–5.9)
SODIUM SERPL-SCNC: 131 MMOL/L (ref 137–147)
WBC # BLD AUTO: 8.8 THOUSAND/UL (ref 4.5–11)

## 2022-01-27 PROCEDURE — 97530 THERAPEUTIC ACTIVITIES: CPT

## 2022-01-27 PROCEDURE — 99232 SBSQ HOSP IP/OBS MODERATE 35: CPT | Performed by: INTERNAL MEDICINE

## 2022-01-27 PROCEDURE — 99232 SBSQ HOSP IP/OBS MODERATE 35: CPT | Performed by: PHYSICAL MEDICINE & REHABILITATION

## 2022-01-27 PROCEDURE — 97162 PT EVAL MOD COMPLEX 30 MIN: CPT

## 2022-01-27 PROCEDURE — 97129 THER IVNTJ 1ST 15 MIN: CPT

## 2022-01-27 PROCEDURE — 80048 BASIC METABOLIC PNL TOTAL CA: CPT | Performed by: PHYSICAL MEDICINE & REHABILITATION

## 2022-01-27 PROCEDURE — 92610 EVALUATE SWALLOWING FUNCTION: CPT

## 2022-01-27 PROCEDURE — 92523 SPEECH SOUND LANG COMPREHEN: CPT

## 2022-01-27 PROCEDURE — 97112 NEUROMUSCULAR REEDUCATION: CPT

## 2022-01-27 PROCEDURE — 97166 OT EVAL MOD COMPLEX 45 MIN: CPT

## 2022-01-27 PROCEDURE — 97535 SELF CARE MNGMENT TRAINING: CPT

## 2022-01-27 PROCEDURE — 82948 REAGENT STRIP/BLOOD GLUCOSE: CPT

## 2022-01-27 PROCEDURE — 85025 COMPLETE CBC W/AUTO DIFF WBC: CPT | Performed by: PHYSICAL MEDICINE & REHABILITATION

## 2022-01-27 RX ORDER — MAGNESIUM CARB/ALUMINUM HYDROX 105-160MG
148 TABLET,CHEWABLE ORAL ONCE AS NEEDED
Status: DISCONTINUED | OUTPATIENT
Start: 2022-01-27 | End: 2022-02-02 | Stop reason: HOSPADM

## 2022-01-27 RX ORDER — SIMETHICONE 80 MG
80 TABLET,CHEWABLE ORAL EVERY 6 HOURS PRN
Status: DISCONTINUED | OUTPATIENT
Start: 2022-01-27 | End: 2022-02-02 | Stop reason: HOSPADM

## 2022-01-27 RX ORDER — BISACODYL 10 MG
10 SUPPOSITORY, RECTAL RECTAL DAILY PRN
Status: DISCONTINUED | OUTPATIENT
Start: 2022-01-27 | End: 2022-02-02 | Stop reason: HOSPADM

## 2022-01-27 RX ORDER — SODIUM PHOSPHATE, DIBASIC AND SODIUM PHOSPHATE, MONOBASIC 7; 19 G/133ML; G/133ML
1 ENEMA RECTAL ONCE AS NEEDED
Status: DISCONTINUED | OUTPATIENT
Start: 2022-01-27 | End: 2022-02-02 | Stop reason: HOSPADM

## 2022-01-27 RX ADMIN — LEVETIRACETAM 1000 MG: 500 TABLET, FILM COATED ORAL at 20:28

## 2022-01-27 RX ADMIN — PANTOPRAZOLE SODIUM 40 MG: 40 TABLET, DELAYED RELEASE ORAL at 06:35

## 2022-01-27 RX ADMIN — DOCUSATE SODIUM 100 MG: 100 CAPSULE, LIQUID FILLED ORAL at 09:21

## 2022-01-27 RX ADMIN — DEXAMETHASONE 6 MG: 4 TABLET ORAL at 09:21

## 2022-01-27 RX ADMIN — DOCUSATE SODIUM 100 MG: 100 CAPSULE, LIQUID FILLED ORAL at 19:49

## 2022-01-27 RX ADMIN — LEVETIRACETAM 1000 MG: 500 TABLET, FILM COATED ORAL at 09:20

## 2022-01-27 RX ADMIN — ENOXAPARIN SODIUM 40 MG: 40 INJECTION SUBCUTANEOUS at 09:21

## 2022-01-27 RX ADMIN — AMLODIPINE BESYLATE 10 MG: 10 TABLET ORAL at 09:20

## 2022-01-27 RX ADMIN — DEXAMETHASONE 6 MG: 4 TABLET ORAL at 20:28

## 2022-01-27 RX ADMIN — SENNOSIDES 17.2 MG: 8.6 TABLET, FILM COATED ORAL at 09:21

## 2022-01-27 NOTE — PROGRESS NOTES
[facility]  Progress Note - Gerhardt Hale  1965, 64 y o  male MRN: [id number]  Unit/Bed#: -91 Encounter: [id number]  Primary Care Provider: Kareem Zhang DO   Date and time admitted to hospital: 1/26/2022 11:15 AM    Hyponatremia  Assessment & Plan  Na+ currently 131  Likely related to SIADH  Start 1800 FR  Obtain serum osmo tomorrow  Continue to trend with routine BMP  Sarcomatoid renal cell carcinoma (HCC)  Assessment & Plan  Hx of renal cell carcinoma s/p nephrectomy in 2009  Recurrence in 2019  Follows with [facility] as an outpatient  Recently placed in hospice in 10/2021 but was then discharged in 12/2021 due to improvement  Plans to discuss radiation treatment in 2 weeks as outpatient with Dr Balbina Zhang  Essential hypertension  Assessment & Plan  Continue home amlodipine 10mg daily  Monitor BP with routine VS   Follow-up with PCP as outpatient  Mixed hyperlipidemia  Assessment & Plan  Currently not taking a statin  Last lipid panel on 2/10/21: Cholesterol 236, Triglycerides 113, HDL 40, and   Currently encouraging diet changes  Recommend repeat lipid panel as outpatient  Hyperglycemia  Assessment & Plan  Slightly elevated BG (110s) at [facility]  Likely steroid induced  Last A1c in 09/2020 was 6 0  May benefit from repeat A1c  Continue to trend fasting BG  * Metastatic renal cell carcinoma to brain [facility])  Assessment & Plan  Hx of renal cell carcinoma in 2009 with recurrence in 2019  Developed RUE weakness  CT head on 1/20 showed L parietal lobe mass 3 1x2 6cm and additional mass 3 2x3 2cm along with 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  1/20 - L frontal and occipital craniotomy performed by Dr Korin Thomas [facility])  Started on Decadron taper in acute setting - transition to 1mg daily on 2/5 and then follow-up with Neurosurgery  Continue Keppra 1000mg Q12  Seizure precautions    Neurovascular checks Q shift   Ensure adequate pain control  Monitor incision for s/s of infection  PT/OT/Speech therapies  Primary team following  VTE Pharmacologic Prophylaxis:   Pharmacologic: Enoxaparin (Lovenox)  Mechanical VTE Prophylaxis in Place: Yes - sequential compression devices  Current Length of Stay: 1 day(s)    Current Patient Status: Inpatient Rehab     Discharge Plan: As per primary team     Code Status: Level 1 - Full Code    Subjective:   Pt examined while sitting in WC in pt room  Currently complaining of lack of sleep due to noise - does not want any PRNs/medications at this time to help with sleep  States that he is normally a light sleeper  Still has not had a BM, denies abdominal pain and is passing gas  Incision is healing nicely  Complaints of pain along incision, mild, 2/10, improves with pain medication  Denies any lightheadedness, dizziness, or grogginess  Therapy went well this morning  Reviewed labs and educated on fluid restriction  Has no other concerns or complaints at this time  Objective:     Vitals:   Temp (24hrs), Av 1 °F (36 7 °C), Min:97 9 °F (36 6 °C), Max:98 3 °F (36 8 °C)    Temp:  [97 9 °F (36 6 °C)-98 3 °F (36 8 °C)] 97 9 °F (36 6 °C)  HR:  [55-80] 80  Resp:  [16-18] 18  BP: (128-147)/(68-80) 128/69  SpO2:  [96 %-99 %] 97 %  Body mass index is 22 72 kg/m²  Review of Systems   Constitutional: Negative for appetite change, chills, fatigue and fever  Respiratory: Negative for cough and shortness of breath  Cardiovascular: Negative for chest pain, palpitations and leg swelling  Gastrointestinal: Positive for constipation  Negative for abdominal distention, abdominal pain, diarrhea, nausea and vomiting  LBM  - denies abdominal pain, passing gas   Genitourinary: Negative for difficulty urinating  Musculoskeletal: Positive for arthralgias (incisional pain, mild, 2/10, aching, has not been taking pain medication)  Negative for back pain  Neurological: Negative for dizziness, weakness, light-headedness, numbness and headaches  Psychiatric/Behavioral: Positive for sleep disturbance (difficulty sleeping due to noise)  Input and Output Summary (last 24 hours): Intake/Output Summary (Last 24 hours) at 1/27/2022 1047  Last data filed at 1/27/2022 0901  Gross per 24 hour   Intake 960 ml   Output 600 ml   Net 360 ml       Physical Exam:     Physical Exam  Vitals and nursing note reviewed  Constitutional:       Appearance: Normal appearance  HENT:      Head: Normocephalic and atraumatic  Cardiovascular:      Rate and Rhythm: Normal rate and regular rhythm  Pulses: Normal pulses  Heart sounds: Normal heart sounds  No murmur heard  No friction rub  Pulmonary:      Effort: Pulmonary effort is normal  No respiratory distress  Breath sounds: Normal breath sounds  No wheezing or rhonchi  Abdominal:      General: Abdomen is flat  Bowel sounds are normal  There is no distension  Palpations: Abdomen is soft  Tenderness: There is no abdominal tenderness  Musculoskeletal:      Cervical back: Normal range of motion and neck supple  Right lower leg: No edema  Left lower leg: No edema  Skin:     General: Skin is warm and dry  Capillary Refill: Capillary refill takes less than 2 seconds  Comments: L craniotomy incision, well-approximated  SAMMY  Sutures in place  No drainage, erythema, or edema present  Neurological:      Mental Status: He is alert and oriented to person, place, and time     Psychiatric:         Mood and Affect: Mood normal          Behavior: Behavior normal          Additional Data:     Labs:    Results from last 7 days   Lab Units 01/27/22  0541   WBC Thousand/uL 8 80   HEMOGLOBIN g/dL 13 1*   HEMATOCRIT % 39 2*   PLATELETS Thousands/uL 168   NEUTROS PCT % 89*   LYMPHS PCT % 6*   MONOS PCT % 5   EOS PCT % 0     Results from last 7 days   Lab Units 01/27/22  0541   SODIUM mmol/L 131*   POTASSIUM mmol/L 4 5   CHLORIDE mmol/L 104   CO2 mmol/L 26   BUN mg/dL 29*   CREATININE mg/dL 0 68*   ANION GAP mmol/L 1*   CALCIUM mg/dL 8 5   GLUCOSE RANDOM mg/dL 122*         Results from last 7 days   Lab Units 01/27/22  0637   POC GLUCOSE mg/dl 106               Labs reviewed    Imaging:    Imaging reviewed    Recent Cultures (last 7 days):           Last 24 Hours Medication List:   Current Facility-Administered Medications   Medication Dose Route Frequency Provider Last Rate    acetaminophen  650 mg Oral Q6H PRN Jimena Slater MD      amLODIPine  10 mg Oral Daily Jimena Slater MD      bisacodyl  10 mg Rectal Daily PRN Jimena Slater MD     [facility] ON 2/5/2022] dexamethasone  1 mg Oral Daily Jimena Slater MD      [START ON 1/31/2022] dexamethasone  2 mg Oral Q12H [address] Jimena Slater MD      [START ON 2/2/2022] dexamethasone  2 mg Oral Daily Jimena Slater MD      [START ON 1/29/2022] dexamethasone  4 mg Oral Q12H [address] Jimena Slater MD      dexamethasone  6 mg Oral Q12H [address] Jimena Slater MD      docusate sodium  100 mg Oral BID Jimena Slater MD      enoxaparin  40 mg Subcutaneous Daily Jimena Slater MD      levETIRAcetam  1,000 mg Oral Q12H [address] Jimena Slater MD      ondansetron  4 mg Oral Q6H PRN Jimena Slater MD      oxyCODONE  10 mg Oral Q4H PRN Jimena Slater MD      oxyCODONE  5 mg Oral Q4H PRN Jimena Slater MD      pantoprazole  40 mg Oral Early Morning Jimena Slater MD      polyethylene glycol  17 g Oral Daily PRN Jimena Slater MD      senna  2 tablet Oral Daily Jimena Slater MD          M*Modal software was used to dictate this note  It may contain errors with dictating incorrect words or incorrect spelling  Please contact the provider directly with any questions

## 2022-01-27 NOTE — PROGRESS NOTES
01/27/22 0700   Patient Data   Rehab Impairment Impairment of mobility, safety, Activities of Daily Living (ADLs), and cognitive/communication skills due to Brain Dysfunction:  02 1  Non-Traumatic    Etiologic Diagnosis Brain Dysfunction   Date of Onset 01/20/22   Support System   Name Shira Griffith   Relationship spouse   Able to provide 24 hour supervision Yes   Able to provide physical help? Yes   Multiple Support Systems Yes   Support System 2   Name 2 Oralia Rodriguez  (works during the day)   Relationship 2 son   Home Setup   Type of Home Multi Level   Method of Entry Stairs   Number of Stairs 3  (2-3, no HR)   Number of Stairs in Home 15  (FF to 2nd flr)   In Home Hand Rail Left   First [address] Full; Shower; Door   First Floor Setup Available Yes  (per pt)   Home Modifications Necessary?   (pending progress)   Home Modification Comment pending progress   Available Equipment   (None)   Baseline Information   [address]  prior to Admission   [facility] on Oct 2021)   Vocation Other  (disability)   Transportation Family/friends drive   Prior Device(s) Used   (None)   Prior IADL Participation   Money Management   (50/50 with wife)   Meal Preparation Full Participation;Stove top; Bonnie Goltz   (wife completes)   Home Cleaning   (wife completes)   Prior Level of Function   Self-Care 3  Independent - Patient completed the activities by him/herself, with or without an assistive device, with no assistance from a helper  Indoor-Mobility (Ambulation) 3  Independent - Patient completed the activities by him/herself, with or without an assistive device, with no assistance from a helper  Stairs 3  Independent - Patient completed the activities by him/herself, with or without an assistive device, with no assistance from a helper  Functional Cognition 3  Independent - Patient completed the activities by him/herself, with or without an assistive device, with no assistance from a helper     Prior Assistance Needed for Household Chores/Cleaning;Money Management; Shopping  (meds from bottles)   Prior Device Used Z  None of the above   Falls in the Last Year   Number of falls in the past 12 months 0   Patient Preference   Nickname (Patient Preference) Bobby Medina   Psychosocial   Psychosocial (WDL) WDL   Restrictions/Precautions   Precautions Fall Risk   Weight Bearing Restrictions No   ROM Restrictions No   Pain Assessment   Pain Score No Pain   Eating Assessment   Adaptive Equipment Other  (built up foam)   Food To Mouth Yes   Positioning Upright   Meal Assessed Breakfast   Current Diet Dysphia III; Thin   Opens Packages Yes  (vc ancouragement to use R hand)   Type of Assistance Needed Glenys Ocasio / Frank ; Adaptive equipment   Physical Assistance Level No physical assistance   Comment assessed pt with opening containers  vc to utilize RUE, no spiallge observed  Pt observed to perform hand to mouth with R hand with vc  Observed dec grp on fork, provided pt with built up foam with improved grasp and pt reporting inc comfort   Eating CARE Score 5   Oral Hygiene   Type of Assistance Needed Incidental touching   Physical Assistance Level No physical assistance   Comment completed in stance CGA for balance  educ  pt on using R hand with dec coord observed   Oral Hygiene CARE Score 4   Grooming   Able To Wash/Dry Face;Brush/Clean Teeth;Wash/Dry Hands   Limitation Noted In Strength;Timeliness   Tub/Shower Transfer   Reason Not Assessed Other  (NO ACTIVE SHOWER ORDER)   Shower/Bathe Self   Type of Assistance Needed Physical assistance   Physical Assistance Level 25% or less   Comment no active shower order, completed SB  Pt able to wash 10/10parts Ramiro/CGA in stance for sean/buttock area   Dec coord/speed of RUE with vc to attend   Shower/Bathe Self CARE Score 3   [address]   Anticipated D/C Bath Style Shower   Able to Lawtey Stefan No   Able to [employer] Temperature No   Able to Wash/Rinse/Dry (body part) Left Arm;Right Arm;L Upper Leg;R Upper Leg;L Lower Leg/Foot;R Lower Leg/Foot;Chest;Abdomen;Perineal Area; Buttocks   Limitations Noted in Balance; Endurance; Safety;Strength;Timeliness   Positioning Seated;Standing   Dressing/Undressing Clothing   Remove UB Clothes Pullover Shirt   Don UB Clothes Pullover Shirt   Type of Assistance Needed Supervision   Physical Assistance Level No physical assistance   Comment seated able to don   Upper Body Dressing CARE Score 4   Remove LB Clothes Undergarment;Pants;Socks   Don LB Clothes Undergarment;Pants; Shoes   Type of Assistance Needed Physical assistance   Physical Assistance Level 25% or less   Comment pt able to don brief/pants  Observed to favor L side, vc to use R arm  Ramiro/CGA in stance for CM, vc to pullup R side completely   Lower Body Dressing CARE Score 3   Limitations Noted In Balance; Endurance;Strength;Timeliness;ROM; Coordination   Positioning Supported Sit;Standing   Putting On/Taking Off Footwear   Type of Assistance Needed Supervision   Physical Assistance Level No physical assistance   Comment seated to don slippers   Putting On/Taking Off Footwear CARE Score 4   Toileting Hygiene   Type of Assistance Needed Physical assistance   Physical Assistance Level 25% or less   Comment Ramiro/CGA for balance   Toileting Hygiene CARE Score 3   Toilet Transfer   Surface Assessed Raised Toilet   Transfer Technique Standard   Limitations Noted In Balance; Endurance;UE Strength;LE Strength   Type of Assistance Needed Physical assistance   Physical Assistance Level 26%-50%   Comment Ramiro for tyransfer u sing HHA   Toilet Transfer CARE Score 3   Toileting   Able to Pull Clothing down yes, up yes  Able to Manage Clothing Closures Yes   Manage Hygiene Bladder   Limitations Noted In Balance;ROM;UE Strength;LE Strength   Transfer Bed/Chair/Wheelchair   Limitations Noted In Balance; Coordination; Endurance;UE Strength;LE Strength   Type of Assistance Needed Physical assistance   Physical Assistance Level 26%-50%   Comment Ramiro for transfer, no AD, using HHA   Chair/Bed-to-Chair Transfer CARE Score 3   Lying to Sitting on Side of Bed   Type of Assistance Needed Supervision   Physical Assistance Level No physical assistance   Comment has bed at home with [facility] anf feet features   Lying to Sitting on Side of Bed CARE Score 4   Sit to Stand   Type of Assistance Needed Incidental touching   Physical Assistance Level No physical assistance   Comment CGA no AD   Sit to Stand CARE Score 4   Walk 10 Feet   Type of Assistance Needed Physical assistance   Physical Assistance Level 26%-50%   Comment short fx mobility no AD, Ramiro for HHA on R side in order for pt to attend to R side   Walk 10 Feet CARE Score 3   Comprehension   Assist Devices Glasses  (contacts)   Auditory Complex   Visual Complex   QI: Comprehension 3  Usually Understands: Understands most conversations, but misses some part/intent of message  Requires cues at times to understand  Comprehension (FIM) 5 - Understands basic directions and conversation   Expression   Verbal Complex   Non-Verbal Complex   Intelligibility Sentence   QI: Expression 3  Exhibits some difficulty with expressing needs and ideas (e g , some words or finishing thoughts) or speech is not clear   Expression (FIM) 5 - Needs help/cues only RARELY (< 10% of the time)   RUE Assessment   RUE Assessment   (4-/5, full ROM, dec coord)   LUE Assessment   LUE Assessment WFL   Coordination   Movements are Fluid and Coordinated 1  (dec RUE coord/ataxia, dec motor coord)   Sensation   Light Touch No apparent deficits   Cognition   Overall Cognitive Status WFL   Arousal/Participation Alert; Cooperative   Attention Attends with cues to redirect   Orientation Level Oriented X4   Memory Decreased short term memory   Following Commands Follows one step commands with increased time or repetition   Vision   Vision Comments No c/o blurry/double vision   Perception Inattention/Neglect Cues to attend to right side of body   Objective Measure   OT Measure(s) 9HPT and  strength   OT Findings see below   Discharge Information   Vocational Plan Retired/not working   Barriers to Return to "[employer] Endurance   Patient's Discharge Plan Return home with wife and son   Patient's Rehab Expectations "Play guitar and cook and be independe  Gong everything I used to do"   Barriers to Discharge Home Decreased Strength;Decreased Endurance   Impressions Eriberto Ford  is a 64 y o  male with medical history of 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 (s/p XRT to cerebellar lesion, L nephrectomy in 2009), HTN who presented [facility] for R sided weakness  MRI brain showed brain mets on the L, and he had a frontal and occipital craniotomy on the L for biopsy on 1/20 with Dr Haque Patches  Post-op he was started on high dose steroid taper  Radiation oncology was consulted for post-op SRT - they will plan to have him follow-up in 2 weeks with Dr Cinthya Dominguez to discuss next steps  He was originally diagnosed in 2009, had a nephrectomy, and had a recurrence in 2019  He has had metastectomy for retroperitoneal recurrence, and has been on ipi/nivo, maintenance nivo, cabozantinib, pembrolizumab, and levatinib  He was briefly in hospice in 10/2021, but after doing better was transferred out in December 2021  He has had radiation to para-aortic lymphnode in 2021, and also to a cerebellar lesion in 2009  Pt supine in bed upon arrival, reports he lives in a multi level home with wife, son and dtr who comes every weekend  Reports he was indep with ADLs, wife assisted with IADls and driving  At this time pt performing ADLs at Ramiro/CGA level with noted dec attention to R side, dec RUE weakness and balance  Therapist did place pt on bed/chair alarm and GH to assess safety and maintain safety and prevent fall risk   In addition, therapist provided pt with bin including clothes pins (to inc  strength), coins [facility]), yellow tputty (strength) cards (sup/pron)  Reviewed all the above with pt and educ to complete in room to cont to inc fx use of RUE, with pt verbalizing understanding  Therapist assessed 9HPT and  strength with noted deficits on R side, see below for results  Pt seen for 90mins of skilled OT services with emphasis on self-care, transfers, fx use of RUE, assessment of RUE FMC/strength, as well as therapist educated pt on rehab process, goal setting and ELOS which pt verbalized understanding  Pt presents with the following performance component deficits impacting ADL/IADL skills: R side weakness, dec attention to R side, impaired balance, decreased endurance, decreased coordination, increased fall risk, new onset of impairment of functional mobility, decreased ADLS, decreased IADLS, decreased activity tolerance, decreased safety awareness,that result in activity limitations and/or participation restrictions  Pt to continue to benefit from continued acute rehab OT services during hospital stay to address defined deficits and to maximize level of functional independence in the following Occupational Performance areas: grooming, bathing/shower, toilet hygiene, dressing, medication management, functional mobility, community mobility, clothing management, cleaning, meal prep and household maintenance  Treatment approaches may include: OT eval, self-care, there ex, therapeutic activity, modalities  Pt presents with good rehab potential  This evaluation requires clinical decision making of moderate complexity, because the patient presents with comorbidites that affect occupational performance and required significant modification of tasks or assistance with consideration of multiple treatment options   Pt is unsafe to D/C home at this time, recommending ELOS 7-10days to achieve Marjorie level goals with least restrictive device to address these areas and resume prior occupational roles to maximize independence to reduce risk of fall and decrease risk of readmission        OT Therapy Minutes   OT Time In 0700   OT Time Out 0830   OT Total Time (minutes) 90   OT Mode of treatment - Individual (minutes) 90   OT Mode of treatment - Concurrent (minutes) 0   OT Mode of treatment - Group (minutes) 0   OT Mode of treatment - Co-treat (minutes) 0   OT Mode of Treatment - Total time(minutes) 90 minutes   OT Cumulative Minutes 90   Cumulative Minutes   Cumulative therapy minutes 90      9 HOLE PEG TEST (IE)  Right: [id number] (NWFL)  Left: 23 40sec     STRENGTH (IE)  Right: 30#avg (NWFL)  Left: 65#, 60#, 55# avg 60#

## 2022-01-27 NOTE — PROGRESS NOTES
01/27/22 0700   Rehab Team Goals   ADL Team Goal Patient will be independent with ADLs with least restrictive device upon completion of rehab program   Rehab Team Interventions   OT Interventions Self Care; Therapeutic Exercise; Energy Conservation;Patient/Family Education;Group Therapy   Eating Goal   Eating Goal 06  Independent - Patient completes the activity by him/herself with no assistance from a helper  Meal Complete All meals   Status Ongoing; Target goal - one week  (7-10days)   Interventions Other  (built up foam)   Tub/Shower Transfer Goal   Method Shower Stall  (supervision)   Assist Device Seat with Back;Grab Bar;Hand Held Shower   Status Ongoing; Target goal - one week  (7-10days)   Interventions ADL Training;Neuromuscular Education   Bathing Goal   Shower/bathe self Goal 04  Supervision or touching assistance- [place] provides VERBAL CUES or supervision throughout activity  Environment Seated;Standing; Shower   Adaptive Equipment Seat with back;Grab [employer]   Status Ongoing; Target goal - one week  (7-10days)   Intervention Assistive Device; Neuromuscular Education; Therapeutic Exercise;ADL Training   Upper Body Dressing Goal   Upper body dressing Goal 06  Independent - Patient completes the activity by him/herself with no assistance from a helper  Task Upper Body;Arms in/out   Environment Seated   Safety Precautions Safety Precaution   Status Ongoing; Target goal - one week  (7-10days)   Intervention Balance Work;Neuromuscular Education; Therapeutic Exercise; Tolerance Work   Lower Body Dressing Goal   Lower body dressing Goal 06  Independent - Patient completes the activity by him/herself with no assistance from a helper  Putting on/taking off footwear Goal 06  Independent - Patient completes the activity by him/herself with no assistance from a helper  Task Lower Body;Shoe/Slipper;Socks;Pants; Undergarment   Environment Seated;Standing   Safety Precautions Safety Precaution   Status Ongoing; Target goal - one week  (7-10days)   Intervention Balance Work;Neuromuscular Education; Therapeutic Exercise; Tolerance Work   Toileting Transfer Goal   Toilet transfer Goal 06  Independent - Patient completes the activity by him/herself with no assistance from a helper  Assistive Device Bedside Commode;Roller Walker   Safety Safety Precaution   Status Ongoing; Target goal - one week  (7-10days)   Intervention ADL Training;Balance Work;Assistive Device   Toileting Goal   Toileting hygiene Goal 06  Independent - Patient completes the activity by him/herself with no assistance from a helper  Task Pants Up;Pants Down;Hygiene   Safety Balance   Status Ongoing; Target goal - one week  (7-10days)   Intervention ADL Training;Balance Work;Assistive Device   Comprehension Goal   Comprehension Assist Level Moderate Towner   Assist Device Glasses   Function Demand Complex Needs   Status Ongoing; Target goal - one week  (7-10days)   Interventions Use of Gesture   Expression Goal   Expression Assist Level Moderate Towner   Assist Device Gestures yes; Gestures no   Function Demand Basic Needs   Status Ongoing; Target goal - one week  (7-10days)   Intervention Writing Tasks   Meal Prep and Kitchen Mobility   Assist Level Supervision   Status Ongoing; Target goal - one week  (7-10days)   Medication Management   Assist Level Independent  (takes from bottles)   Status Ongoing; Target goal - one week  (7-10days)   Laundry   Assist Level   (wife completes)   Status Ongoing; Target goal - one week  (7-10days)   Additional Goal (other)   Goal Type (other) Pt will demonstrate inc RUE FMC/ strength by 20sec and 5# to inc overall fx performance in ADL tasks  Status Ongoing; Target goal - one week  (7-10days)

## 2022-01-27 NOTE — TEAM CONFERENCE
Acute RehabilitationTeam Conference Note  Date: 1/27/2022   Time: 12:54 PM       Patient Name:  Johnny Álvarez  Medical Record Number: [id number]   YOB: 1965  Sex:  Male          Room/Bed:  /[facility] 268-01  Payor Info:  Payor: BLUE CROSS / Plan: [facility] PLAN 361 / Product Type: Blue Fee for Service /      Admitting Diagnosis: Malignant neoplasm of brain ([facility] ) [C71 9]   Admit Date/Time:  1/26/2022 11:15 AM  Admission Comments: No comment available     Primary Diagnosis:  Metastatic renal cell carcinoma to brain [facility])  Principal Problem: Metastatic renal cell carcinoma to brain [facility])    Patient Active Problem List    Diagnosis Date Noted    Metastatic renal cell carcinoma to brain ([place] [id number] ) 01/26/2022    Hyponatremia 01/26/2022    Dysphagia 01/26/2022    Sarcomatoid renal cell carcinoma ([place] [address] ) 06/03/2019    Weight loss, abnormal 03/13/2019    Left hip pain 10/22/2018    Osteoarthritis of left knee 03/15/2017    Hyperglycemia 12/23/2015    Vitamin D deficiency 12/23/2015    Mixed hyperlipidemia 07/02/2012    Essential hypertension 07/02/2012       Physical Therapy:         No notes on file    Occupational Therapy:  Eating:  (setup)  Grooming: Incidental Touching  Bathing: Minimal Assistance  Bathing: Minimal Assistance  Upper Body Dressing: Supervision  Lower Body Dressing: Minimal Assistance  Toileting: Minimal Assistance  Tub/Shower Transfer:  (N/A no active shower order)  Toilet Transfer: Minimal Assistance  Cognition: Exceptions to WNL  Cognition: Decreased Safety  Orientation: Person,Place,Time,Situation  Discharge Recommendations: Home with:  [address] [facility] Nicolas Elias with[de-identified] Family Support,First Floor Setup (home vs OP pending progress)       OT eval to be completed on 1/27/22 1/27/22  Johnny Álvarez  is a 64 y o  male with medical history of 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 (s/p XRT to cerebellar lesion, L nephrectomy in 2009), HTN who presented to [facility] for R sided weakness  MRI brain showed brain mets on the L, and he had a frontal and occipital craniotomy on the L for biopsy on 1/20 with Dr Steven Medrano  Post-op he was started on high dose steroid taper  Radiation oncology was consulted for post-op SRT - they will plan to have him follow-up in 2 weeks with Dr Adonis Hurtado to discuss next steps  He was originally diagnosed in 2009, had a nephrectomy, and had a recurrence in 2019  He has had metastectomy for retroperitoneal recurrence, and has been on ipi/nivo, maintenance nivo, cabozantinib, pembrolizumab, and levatinib  He was briefly in hospice in 10/2021, but after doing better was transferred out in December 2021  He has had radiation to para-aortic lymphnode in 2021, and also to a cerebellar lesion in 2009  Pt supine in bed upon arrival, reports he lives in a multi level home with wife, son and dtr who comes every weekend  Reports he was indep with ADLs, wife assisted with IADls and driving  At this time pt performing ADLs at Ramiro/CGA level with noted dec attention to R side, dec RUE weakness and balance  Therapist did place pt on bed/chair alarm and GH to assess safety and maintain safety and prevent fall risk  In addition, therapist provided pt with bin including clothes pins (to inc  strength), coins [place] [facility]), yellow tputty (strength) cards (sup/pron)  Reviewed all the above with pt and educ to complete in room to cont to inc fx use of RUE, with pt verbalizing understanding  Therapist assessed 9HPT and  strength with noted deficits on R side, see below for results  Pt seen for 90mins of skilled OT services with emphasis on self-care, transfers, fx use of RUE, assessment of RUE FMC/strength, as well as therapist educated pt on rehab process, goal setting and ELOS which pt verbalized understanding   Pt presents with the following performance component deficits impacting ADL/IADL skills: R side weakness, dec attention to R side, impaired balance, decreased endurance, decreased coordination, increased fall risk, new onset of impairment of functional mobility, decreased ADLS, decreased IADLS, decreased activity tolerance, decreased safety awareness,that result in activity limitations and/or participation restrictions  Pt to continue to benefit from continued acute rehab OT services during hospital stay to address defined deficits and to maximize level of functional independence in the following Occupational Performance areas: grooming, bathing/shower, toilet hygiene, dressing, medication management, functional mobility, community mobility, clothing management, cleaning, meal prep and household maintenance  Treatment approaches may include: OT eval, self-care, there ex, therapeutic activity, modalities  Pt presents with good rehab potential  This evaluation requires clinical decision making of moderate complexity, because the patient presents with comorbidites that affect occupational performance and required significant modification of tasks or assistance with consideration of multiple treatment options  Pt is unsafe to D/C home at this time, recommending ELOS 7-10days to achieve Marjorie level goals with least restrictive device to address these areas and resume prior occupational roles to maximize independence to reduce risk of fall and decrease risk of readmission  Speech Therapy:           Orders received for skilled SLP assessment- to be completed on this date 1/27/2022  Nursing Notes:  Appetite: Good  Diet Type: Dysphagia III                                                                        Pain Score: 0                                  Pt is a 64 y o  male with a PMH of renal cell carcinoma s/p nephrectomy in 2009 with recurrence in 2009, dyslipidemia, and HTN who originally presented to [facility] on 1/20/2022 after developing RUE weakness    CT scan showed multiple lesions in the brain with L parietal lobe masses measuring 3 1x2 6cm and 3 2x3 2cm along with chronic L cerebellar metastasis  Patient underwent L frontal and occipital craniotomy with stereotactic resection on 1/20/2022 performed by Dr Genesis Hernandes  Started on decadron taper post-operatively to be transitioned to 1mg daily on 2/5  Will require further instructions from Neurosurgery on maintaining dose  Plans to follow-up with Dr Diana Nava (Oncology at [facility]) in 2 weeks as outpatient to discuss radiation treatment options  Pt is on Amlodipine 10 mg daily for HTN, on fasting blood sugar, level increased possibly steroid induced, on Decadron tapered dose, Keppra 1,000 mg every 12 hrs and on seizure precaution  Pt currently has no pain  This week we will monitor v/s and lab results  We will also monitor site of incision  Will educate patient on importance of T/R & off loading weight while in bed/chair to prevent pressure injury  We will keep patient safe from falls by continuing hourly rounding and encouraging pt  to utilize call bell for assistance  We will promote independence with ADLs  Case Management:     Discharge Planning  Living Arrangements: Lives w/ Spouse/significant other,Lives w/ Children  Support Systems: Spouse/significant other  Assistance Needed: unable to assess at present  Type of Current Residence: Private residence  Current Home Care Services: No  Pt is a new admission and initial evaluation to occur  Is the patient actively participating in therapies? yes  List any modifications to the treatment plan:     Barriers Interventions   Dyphagia Improving, therapy exercises   Aspiration risk Distant supervision   Hyponatremia  Lab work up in the morning   Impulsive Education   RS weakness NMR   Steriod - induced hyperglycemia  Medical team monitoring     Is the patient making expected progress toward goals?  yes  List any update or changes to goals:     Medical Goals: Patient will be medically stable for discharge to [facility] restrictive envrionment upon completion of rehab program and Patient will be able to manage medical conditions and comorbid conditions with medications and follow up upon completion of rehab program    Weekly Team Goals:   Rehab Team Goals  ADL Team Goal: Patient will be independent with ADLs with least restrictive device upon completion of rehab program  Bowel/Bladder Team Goal: Patient will be independent with bladder/bowel management with least restrictive device upon completion of rehab program    Discussion: Pt presents with the above barriers  He lives with his wife and his son  He was independent before admission  He scored mild on cognitive assessment  He is cleared for regular and thin diet now after swallow evaluation  He is min a/ CGA for functional tasks in evaluations  Goals are for 7-10 days  Recommendations will be made as pt progresses  Anticipated Discharge Date:  [facility] Team Members Present: The following team members are supervising care for this patient and were present during this Weekly Team Conference      Physician: Dr Ricardo Alvarez MD  : TROY Szymanski  Registered Nurse: Kaye Nava RN  Physical Therapist: PAM LingT  Occupational Therapist: Alexus Roque, MS, OTR/L  Speech Therapist: Carla Puente [address], [address] [facility]

## 2022-01-27 NOTE — PCC OCCUPATIONAL THERAPY
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 (s/p XRT to cerebellar lesion, L nephrectomy in 2009), HTN who presented to [facility] for R sided weakness  MRI brain showed brain mets on the L, and he had a frontal and occipital craniotomy on the L for biopsy on 1/20 with Dr Steven Medrano  Post-op he was started on high dose steroid taper  Radiation oncology was consulted for post-op SRT - they will plan to have him follow-up in 2 weeks with Dr Adonis Hurtado to discuss next steps  He was originally diagnosed in 2009, had a nephrectomy, and had a recurrence in 2019  He has had metastectomy for retroperitoneal recurrence, and has been on ipi/nivo, maintenance nivo, cabozantinib, pembrolizumab, and levatinib  He was briefly in hospice in 10/2021, but after doing better was transferred out in December 2021  He has had radiation to para-aortic lymphnode in 2021, and also to a cerebellar lesion in 2009  Pt supine in bed upon arrival, reports he lives in a multi level home with wife, son and dtr who comes every weekend  Reports he was indep with ADLs, wife assisted with IADls and driving  At this time pt performing ADLs at Ramiro/CGA level with noted dec attention to R side, dec RUE weakness and balance  Therapist did place pt on bed/chair alarm and GH to assess safety and maintain safety and prevent fall risk  In addition, therapist provided pt with bin including clothes pins (to inc  strength), coins [place] [facility]), yellow tputty (strength) cards (sup/pron)  Reviewed all the above with pt and educ to complete in room to cont to inc fx use of RUE, with pt verbalizing understanding  Therapist assessed 9HPT and  strength with noted deficits on R side, see below for results   Pt seen for 90mins of skilled OT services with emphasis on self-care, transfers, fx use of RUE, assessment of RUE FMC/strength, as well as therapist educated pt on rehab process, goal setting and ELOS which pt verbalized understanding  Pt presents with the following performance component deficits impacting ADL/IADL skills: R side weakness, dec attention to R side, impaired balance, decreased endurance, decreased coordination, increased fall risk, new onset of impairment of functional mobility, decreased ADLS, decreased IADLS, decreased activity tolerance, decreased safety awareness,that result in activity limitations and/or participation restrictions  Pt to continue to benefit from continued acute rehab OT services during hospital stay to address defined deficits and to maximize level of functional independence in the following Occupational Performance areas: grooming, bathing/shower, toilet hygiene, dressing, medication management, functional mobility, community mobility, clothing management, cleaning, meal prep and household maintenance  Treatment approaches may include: OT eval, self-care, there ex, therapeutic activity, modalities  Pt presents with good rehab potential  This evaluation requires clinical decision making of moderate complexity, because the patient presents with comorbidites that affect occupational performance and required significant modification of tasks or assistance with consideration of multiple treatment options  Pt is unsafe to D/C home at this time, recommending ELOS 7-10days to achieve Marjorie level goals with least restrictive device to address these areas and resume prior occupational roles to maximize independence to reduce risk of fall and decrease risk of readmission

## 2022-01-27 NOTE — PCC NURSING
Pt is a 64 y o  male with a PMH of renal cell carcinoma s/p nephrectomy in 2009 with recurrence in 2009, dyslipidemia, and HTN who originally presented to [facility] on 1/20/2022 after developing RUE weakness  CT scan showed multiple lesions in the brain with L parietal lobe masses measuring 3 1x2 6cm and 3 2x3 2cm along with chronic L cerebellar metastasis  Patient underwent L frontal and occipital craniotomy with stereotactic resection on 1/20/2022 performed by Dr Chavez Fent  Started on decadron taper post-operatively to be transitioned to 1mg daily on 2/5  Will require further instructions from Neurosurgery on maintaining dose  Plans to follow-up with Dr Jose Resendez (Oncology at [facility]) in 2 weeks as outpatient to discuss radiation treatment options  Pt is on Amlodipine 10 mg daily for HTN, on fasting blood sugar, level increased possibly steroid induced, on Decadron tapered dose, Keppra 1,000 mg every 12 hrs and on seizure precaution  Pt currently has no pain  This week we will monitor v/s and lab results  We will also monitor site of incision  Will educate patient on importance of T/R & off loading weight while in bed/chair to prevent pressure injury  We will keep patient safe from falls by continuing hourly rounding and encouraging pt  to utilize call bell for assistance  We will promote independence with ADLs

## 2022-01-27 NOTE — ASSESSMENT & PLAN NOTE
Slightly elevated BG (110s) at [facility]  Likely steroid induced  Last A1c in 09/2020 was 6 0  May benefit from repeat A1c  Continue to trend fasting BG

## 2022-01-27 NOTE — ASSESSMENT & PLAN NOTE
Na+ currently 131  Likely related to SIADH  Start 1800 FR  Obtain serum osmo tomorrow  Continue to trend with routine BMP

## 2022-01-27 NOTE — CASE MANAGEMENT
Cm met with pt and reviewed team meeting update  He is in agreement with a "reteam" while he continues to work on his established goals  Reviewed potential LOS  Following to assist w/ d/c planning needs

## 2022-01-27 NOTE — PROGRESS NOTES
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 [place] on 1/26  Chief Complaint: No new issues     Interval History/Subjective:  No new issues last night  Slept well, except 268 kept him awake  He denies any pain, changes in vision, new numbness/tingling/weakness  No CP, SOB, fevers, chills, N/V, abdominal pain  Last BM still 1/21  He feels he may go today, has been feeling very gassy  Open to suppository or enema today if no BM     ROS:  A 10 point review of systems was negative except for what is noted in the HPI  Today's Changes:  1  Adding PRN simethicone  2  Will give magnesium citrate today if no BM  If no BM, would move to suppository or enema  3  For hyponatremia, will place on fluid restriction, suspect SIADH - recheck labs with an osm tomorrow  4  OK to remove surgical dressing  Ok to shower, keep head dry  5  Team Conference today, see separate note  Total time spent:  35 minutes, with more than 50% spent counseling/coordinating care  Counseling includes discussion with patient re: progress in therapies, functional issues observed by therapy staff, and discussion with patient his/her current medical state/wellbeing   Coordination of patient's care was performed in conjunction with Internal Medicine service to monitor patient's labs, vitals, and management of their comorbidities  In addition, this patient was discussed by the interdisciplinary team in weekly case conference today  The care of the patient was extensively discussed with all care providers and an appropriate rehabilitation plan was formulated unique for this patient  Barriers were identified preventing progression of therapy and appropriate interventions were discussed with each discipline  Please see the team note for input from all disciplines regarding barriers, intervention, and discharge planning  Assessment/Plan:    * Metastatic renal cell carcinoma to brain [facility])  Assessment & Plan  Originally diagnosed in 2009, with L cerebellar mets (s/p radiation) at that time  Had nephrectomy in 2009  Recurrence in 2019  Has had multiple types of systemic therapy  Now with R sided weakness, impaired motor planning/coordination, 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, with cerebral edema with midline shift, parieto/occipital hemorrhage  S/p biopsy on 1/20  Has f/u with Rad Onc in about 2 weeks with plan to discuss next steps at that point   - Closely monitor neuro status  - On decadron taper as prescribed by his team at [facility] 1000mg BID  - Seizure precautions, Delirium precautions   - PT/OT/SLP 3-5 hours a day, 5-7 days/week  - Outpatient f/u with NSx, Radiation Oncology, and Medical Oncology  Dysphagia  Assessment & Plan  Admitted on Dental Soft/Thins diet  Consulted SLP for eval  1/23 note did say he could advance to Reg/Thins, but has not yet been done  Hyponatremia  Assessment & Plan  Likely related to brain mets and recent surgery  Placing on fluid restriction 1800mL  Recheck BMP and osm on 1/28  Sarcomatoid renal cell carcinoma ([facility] [address] )  Assessment & Plan  See "metastatic RCC to brain"     Essential hypertension  Assessment & Plan  Home: Amlodipine 5mg daily  Here: Amlodipine 10mg daily  Monitor BP and adjust as appropriate     IM consulted and management at their discretion    Mixed hyperlipidemia  Assessment & Plan  Not on statin at this time  Outpatient f/u with PCP    Hyperglycemia  Assessment & Plan  Likely 2/2 steroids  Very slightly elevated at [address] A1C 6  Trend fasting BG  Not on CDI at this time  IM consulted  Health Maintenance  #Delirium/Sleep: At risk, has been appropriate  Sleep at times is difficult    -Overstimulation precautions, frequent re-orientation, re-direction, re-assurance  -Sleep log and agitation monitoring if needed  -Optimize sleep-wake cycle  -Limit sedating medications when possible  - Ensure optimal management electrolytes, nutrition, and hydration  - Ensure optimal bowel/bladder management  - Ensure optimal pain management   -Patient/family/caregiver education and training   -Monitor for need for 1:1   -Fall precautions with frequent rounding; proactive toileting program, patient should not be unattended in bathroom     #Pain: Tylenol PRN, Oxycodone 5-10mg PRN  At home on Oxycodone PRN  #Bowel: Last BM at least 4 days prior  Miralax PRN  Docusate/Senna scheduled  Added PRN suppository  Today give mag citrate if no BM  If still no BM give enema  #Bladder: Voiding and continent  #Skin/Pressure Injury Prevention: Turn Q2hr in bed, with weight shifts [id number] in wheelchair  Float heels in bed  #DVT Prophylaxis: Lovenox, SCDs  #GI Prophylaxis: Protonix  #Code Status:  Full Code  #FEN:  Dental/Thins  #Dispo:  Team 1/27: AMY 2 weeks with plan to discharge home to supportive family  2 MANUEL, full flight of stairs to bedroom/bathroom  Goal is modified Ind with mobility and ADLs         Objective:    Functional Update: pending  PT  OT  SLP    Allergies per EMR    Physical Exam:  Temp:  [97 9 °F (36 6 °C)-98 3 °F (36 8 °C)] 97 9 °F (36 6 °C)  HR:  [55-80] 80  Resp:  [18] 18  BP: (128-146)/(68-80) 128/69  Oxygen Therapy  SpO2: 97 %      Gen: No acute distress, Well-nourished, well-appearing  HEENT: Moist mucus membranes  Incision site with sutures  No dehiscence, drainage, signs of active infection  Healing well  Cardiovascular: Regular rate, rhythm, S1/S2  Distal pulses palpable  Heme/Extr: No edema  Pulmonary: Non-labored beathing  : No elizabeth  GI: Soft, non-tender, non-distended  MSK: ROM is full in all extremities  No effusions or deformities  Bulk is symmetric  See below for MMT scores  Integumentary: Skin is warm, dry  Neuro: AAOx3, Appropriate to questioning  Speech stable with word finding difficulties occasionally but improving  R weakness stable  Has R > L pupil in size, but reactive (stable from admission)  Psych: Normal mood and affect  Diagnostic Studies: reviewed, no new imaging  No results found  Laboratory:  Reviewed     Results from last 7 days   Lab Units 01/27/22  0541   HEMOGLOBIN g/dL 13 1*   HEMATOCRIT % 39 2*   WBC Thousand/uL 8 80     Results from last 7 days   Lab Units 01/27/22  0541   BUN mg/dL 29*   POTASSIUM mmol/L 4 5   CHLORIDE mmol/L 104   CREATININE mg/dL 0 68*            Patient Active Problem List   Diagnosis    Hyperglycemia    Mixed hyperlipidemia    Essential hypertension    Osteoarthritis of left knee    Vitamin D deficiency    Left hip pain    Weight loss, abnormal    Sarcomatoid renal cell carcinoma (HCC)    Metastatic renal cell carcinoma to brain (HCC)    Hyponatremia    Dysphagia         Medications  Current Facility-Administered Medications   Medication Dose Route Frequency Provider Last Rate    acetaminophen  650 mg Oral Q6H PRN Azlu Phelps MD      amLODIPine  10 mg Oral Daily Azul Phelps MD      bisacodyl  10 mg Rectal Daily PRN Azul Phelps MD     [facility] Krysten Valencia ON 2/5/2022] dexamethasone  1 mg Oral Daily Azul Phelps MD      [START ON 1/31/2022] dexamethasone  2 mg Oral Q12H [facility] MD oMntana Eckert ON 2/2/2022] dexamethasone  2 mg Oral Daily Azul Phelps MD      [START ON 1/29/2022] dexamethasone  4 mg Oral Q12H Eladio Harmon MD      dexamethasone  6 mg Oral Q12H Eladio Harmon MD      docusate sodium  100 mg Oral BID Gwendloyn Mood, MD      enoxaparin  40 mg Subcutaneous Daily Gwendloyn Mood, MD      levETIRAcetam  1,000 mg Oral Q12H [address] Gwendloyn Mood, MD      ondansetron  4 mg Oral Q6H PRN Gwendloyn Mood, MD      oxyCODONE  10 mg Oral Q4H PRN Gwendloyn Mood, MD      oxyCODONE  5 mg Oral Q4H PRN Gwendloyn Mood, MD      pantoprazole  40 mg Oral Early Morning Gwendloyn Mood, MD      polyethylene glycol  17 g Oral Daily PRN Gwendloyn Mood, MD      senna  2 tablet Oral Daily Gwendloyn Mood, MD            ** Please Note: Fluency Direct voice to text software may have been used in the creation of this document   **

## 2022-01-27 NOTE — PROGRESS NOTES
SLP Bedside Swallow Assessment and Initiated Cognitive Linguistic Communication Assessment       01/27/22 1100   Pain Assessment   Pain Assessment Tool 0-10   Pain Score No Pain   Restrictions/Precautions   Precautions Aspiration;Bed/chair alarms;Cognitive; Fall Risk;Impulsive;Supervision on toilet/commode   Weight Bearing Restrictions No   ROM Restrictions No   Cognitive Linguisitic Assessments   Cognitive Linquistic Quick Test (CLQT) Pt completing portions of formalized cognitive linguistic assessment, CLQT+  Overall score when compared to age matched peers between 25 - 67 yrs   The following cognitive domain scores are as follows:  Memory: score of (145), indicating mild impairment;  Language: score of (27), indicating mild impairment; Of note, pt was below criterion cutoff scores on (2) out of 6 tasks completed during this assesssment  Per results from assessment, SLP services are recommended at this time to complete assessment and target areas of deficit to maximize overall cognitive linguistic skills while in the acute rehab setting  Comprehension   Assist Devices Glasses   Auditory Basic;Complex   Visual Basic;Complex   Findings Pt completed portions of CLQT+ assessment tool- see SLP rehab note for details  QI: Comprehension 3  Usually Understands: Understands most conversations, but misses some part/intent of message  Requires cues at times to understand  Comprehension (FIM) 5 - Needs help/cues, repetition only RARELY ( < 10% of the time)   Expression   Verbal Basic;Complex   Non-Verbal Basic;Complex   Intelligibility Sentence   Findings Pt completed portions of CLQT+ assessment tool- see SLP rehab note for details  QI: Expression 3   Exhibits some difficulty with expressing needs and ideas (e g , some words or finishing thoughts) or speech is not clear   Expression (FIM) 4 - Expresses basic info/needs 75-90% of time   Social Interaction   Cooperation with staff   Participation Individual Behaviors observed Appropriate   Findings Pt completed portions of CLQT+ assessment tool- see SLP rehab note for details  Social Interaction (FIM) 5 - Interacts appropriately with others 90% of time   Problem Solving   Complex Manages finances;Manages discharge planning;Manages medications   Routine Manages call bell;Manges precautions   Findings Pt completed portions of CLQT+ assessment tool- see SLP rehab note for details  Problem solving (FIM) 4 - Solves basic problems 75-89% of time   Memory   Recognize People Yes   Remember Routine Yes   Initiates Tasks Yes   Short-Term Impaired   Long Term Intact   Recalls Precaution No   Findings Pt completed portions of CLQT+ assessment tool- see SLP rehab note for details  Memory (FIM) 4 - Recognizes/recalls/performs 75-89%   Speech/Language/Cognition Assessmetn   Treatment Assessment Pt completed portions of CLQT+ assessment tool- see above for details  Pt presenting with MILD cognitive deficits with some word finding, comprehension, insight to deficits, problem solving, attention, safety awareness, and higher level executive functions  When engaging in initial interview, pt with good recall of events and oriented to all current info and biographical info  Pt at times impulsive with his responses, expressing "I know I have poor attention, I always said I have ADD"  Pt communicated that post procedure, he was expressively aphasic but it has greatly improved since but still notices some word finding at times  Pt was mostly independent prior to surgery, shares finances with wife, per pt was only on 1 medication prior, however stopped driving recently  Pt also on disability but was working as the  at a car dealership  Pt hopes he could go back to work at some point  Pt lives at home with his wife and supportive children  Pt will have 24hr supervision at home for discharge   At this time, pt will cont to benefit from further skilled SLP services to complete cognitive assessment and then target areas of deficit in order to maximize cognitive linguistic communication skills for increased independence and decreased burden of care at time of discharge  Swallow Information   Current Risks for Dysphagia & Aspiration Recent intubation;General debilitation;New Neuro event;Brain injury;Cognitive deficit; Mental status change;HX neurologic dx   Current Diet Dysphagia advance; Thin liquid   Baseline Diet Regular; Thin liquids   Consistencies Assessed and Performance   Materials Admnistered Regular/Solid; Thin liquid   Oral Stage WFL   Phargngeal Stage WFL   Swallow Mechanics WFL;Swallow initation;Good Larygneal rise   Esophageal Concerns No s/s reported   Recommendations   Risk for Aspiration   (low)   Recommendations   (brief follow up)   Diet Solid Recommendation Regular consistency   Diet Liquid Recommendation Thin liquid   Recommended Form of Meds As tolerated   General Precautions Aspiration precautions; Feed only when alert;Minimize distractions;Upright as possible for all oral intake;Remain upright for 45 mins after meals  (OOB FULLY upright for meals, distant sup)   Compensatory Swallowing Strategies Alternate solids and liquids; External pacing;Effortful swallow;Cue for lingual sweep;Voluntary throat clear/cough to clear penetration   Results Reviewed with PT/Family/Caregiver;RN;MD;PAC/CRNP   Eating   Type of Assistance Needed Set-up / clean-up   Physical Assistance Level No physical assistance   Eating CARE Score 5   Swallow Assessment   Swallow Treatment Assessment Pt seen for Bedside Swallow Assessment with lunch  Pt alert and interactive seen OOB upright in chair in room  Pt current diet Dysphagia level 3 and thin liquids  Pt voicing "they let me eat whatever at the other place, sandwiches and stuff"  Pt asking for a turkey sandwich for lunch- deferring hot meal  Retrieved from kitchen- turkey and swiss on rye with lettuce and tomato   Pt drinking thin liquids with sandwich  Meal completion 100% and 240cc liquids  Pt provided with some set up with tray but able to self feed independently  Pt with good bolus retrieval of sandwich by bite  No anterior loss present  Functional oral processing with timely transfers and swallows  Oral clearance complete between swallows and bites  Pt alternating food with thin liquids by cup- good retrieval and oral control, no anterior loss  Transfers and swallows prompt- no s/s aspiration noted  Oral cavity clear at end of meal- no pocketing or retention noted  At this time, recommend UPGRADE to Regular diet and thin liquids- aspiration precautions with Distant Supervision  Further strategies to include- OOB FULLY upright for meal, set up as needed, slow rate, small bites, alternate food and liquids  Follow up x1 warranted to assess tolerance of upgrade and compliance with aspiration precautions and safe swallow strategies  Swallow Assessment Prognosis   Prognosis Good   Prognosis Considerations Age; Co-morbidities; Medical diagnosis; Medical prognosis;Previous level of function;Severity of impairments;New learning ability;Ability to carry over; Cooperation   SLP Therapy Minutes   SLP Time In 1100   SLP Time Out 1200   SLP Total Time (minutes) 60   SLP Mode of treatment - Individual (minutes) 60   SLP Mode of treatment - Concurrent (minutes) 0   SLP Mode of treatment - Group (minutes) 0   SLP Mode of treatment - Co-treat (minutes) 0   SLP Mode of Treatment - Total time(minutes) 60 minutes   SLP Cumulative Minutes 60

## 2022-01-27 NOTE — PROGRESS NOTES
PT initial evaluation        01/27/22 1400   Patient Data   Rehab Impairment Impairement in mobility, ADLs, and safety secoondary to brain dysfunction   Etiologic Diagnosis Brain dysfunction   Date of Onset 01/20/22   Support System   Name Hayley [place]   Relationship Spouse   Able to provide 24 hour supervision Yes   Able to provide physical help? Yes   Multiple Support Systems Yes   Support System 2   Name 2 Tiffanie Pool   Relationship 2 Son   Home Setup   Type of Home Multi Level   Method of Entry Stairs   Number of Stairs 3  (From the garage; no railing)   Number of Stairs in Home 12   In Home Hand Rail Left   First [address] Full; Shower   First Floor Setup Available Yes   Home Modifications Necessary? No   Available Equipment   (None)   Baseline Information   Transportation Family/friends drive   Prior Device(s) Used   (None)   Prior Level of Function   Self-Care 3  Independent - Patient completed the activities by him/herself, with or without an assistive device, with no assistance from a helper  Indoor-Mobility (Ambulation) 3  Independent - Patient completed the activities by him/herself, with or without an assistive device, with no assistance from a helper  Stairs 3  Independent - Patient completed the activities by him/herself, with or without an assistive device, with no assistance from a helper  Functional Cognition 3  Independent - Patient completed the activities by him/herself, with or without an assistive device, with no assistance from a helper  Prior Device Used Z   None of the above   Falls in the Last Year   Number of falls in the past 12 months 0   Patient Preference   Nickname (Patient Preference) Damion Walden   Psychosocial   Psychosocial (WDL) WDL   Restrictions/Precautions   Precautions Fall Risk;Bed/chair alarms;Cognitive;Supervision on toilet/commode   Weight Bearing Restrictions No   Pain Assessment   Pain Assessment Tool 0-10   Pain Score No Pain   Transfer Bed/Chair/Wheelchair   Positioning Concerns Neglect  (Inattention to R side)   Limitations Noted In Balance; Coordination;Problem Solving;UE Strength;LE Strength; Sequencing   Adaptive Equipment None   Type of Assistance Needed Physical assistance   Physical Assistance Level 25% or less   Comment Stand pivot transfers no device  Right inattention to RUE   Chair/Bed-to-Chair Transfer CARE Score 3   Roll Left and Right   Type of Assistance Needed Supervision   Physical Assistance Level No physical assistance   Roll Left and Right CARE Score 4   Sit to Lying   Type of Assistance Needed Supervision   Physical Assistance Level No physical assistance   Sit to Lying CARE Score 4   Lying to Sitting on Side of Bed   Type of Assistance Needed Supervision   Physical Assistance Level No physical assistance   Lying to Sitting on Side of Bed CARE Score 4   Sit to Stand   Type of Assistance Needed Incidental touching   Physical Assistance Level No physical assistance   Comment Initial sit to stand pt has wide ELAN and impulsive   Sit to Stand CARE Score 4   Picking Up Object   Type of Assistance Needed Incidental touching   Comment CG no device wide ELAN   Picking Up Object CARE Score 4   Car Transfer   Reason if not Attempted Environmental limitations   Car Transfer CARE Score 10   Ambulation   Primary Mode of Locomotion Prior to Admission Walk   Distance Walked (feet) 200 ft   Assist Device   (No device)   Gait Pattern Inconsistant [place];Decreased foot clearance;R foot drag;R knee romulo; Improper weight shift   Limitations Noted In Balance; Coordination; Heel Strike;Neglect; Safety   Provided Assistance with: Balance   Walk Assist Level Close Supervision;Contact Guard   Findings Varies depending on task  CG with obstacles  Decreased attention to R side  Easily distracted during amb  Walk 10 Feet   Type of Assistance Needed Supervision; Incidental touching   Physical Assistance Level No physical assistance   Walk 10 Feet CARE Score 4   Walk 50 Feet with Two Turns   Type of Assistance Needed Incidental touching   Physical Assistance Level No physical assistance   Walk 50 Feet with Two Turns CARE Score 4   Walk 150 Feet   Type of Assistance Needed Incidental touching   Physical Assistance Level No physical assistance   Walk 150 Feet CARE Score 4   Walking 10 Feet on Uneven Surfaces   Type of Assistance Needed Incidental touching   Physical Assistance Level No physical assistance   Comment Indoor ramp with no AD   Walking 10 Feet on Uneven Surfaces CARE Score 4   Wheelchair mobility   Type of Wheelchair Used   (NA)   Wheel 50 Feet with Two Turns   Reason if not Attempted Activity not applicable   Wheel 50 Feet with Two Turns CARE Score 9   Wheel 150 Feet   Reason if not Attempted Activity not applicable   Wheel 350 Feet CARE Score 9   Curb or Single Stair   Style negotiated Single stair   Type of Assistance Needed Incidental touching   Physical Assistance Level No physical assistance   Comment With L handrail    1 Step (Curb) CARE Score 4   4 Steps   Type of Assistance Needed Physical assistance   Physical Assistance Level 26%-50%   Comment CG for ascending with handrail on L using reiprocal gait pattern  Min a X 1 for descending with handrail on R  Inattentive to R UE going down the stairs   4 Steps CARE Score 3   12 Steps   Type of Assistance Needed Physical assistance   Physical Assistance Level 26%-50%   Comment CG for ascending with handrail on L using reiprocal gait pattern  Min a X 1 for descending with handrail on R  Inattentive to R UE going down the stairs   12 Steps CARE Score 3   Comprehension   QI: Comprehension 3  Usually Understands: Understands most conversations, but misses some part/intent of message  Requires cues at times to understand  Expression   QI: Expression 3   Exhibits some difficulty with expressing needs and ideas (e g , some words or finishing thoughts) or speech is not clear   RLE Assessment   RLE Assessment WNL  (Grossly 4/5 strength)   LLE Assessment   LLE Assessment WNL   RUE Assessment   RUE Assessment   (Grossly 4/5 strength)   LUE Assessment   LUE Assessment WFL   Sensation   Light Touch No apparent deficits   Propioception No apparent deficits  (However, displays functional deficits)   Cognition   Arousal/Participation Alert; Responsive;Arousable; Cooperative   Attention Attends with cues to redirect   Orientation Level Oriented X4   Memory Decreased short term memory   Following Commands Follows one step commands with increased time or repetition   Vision   Vision Comments Wears glasses   Perception   Inattention/Neglect Cues to attend to right side of body   Objective Measure   PT Measure(s) TUG = 16 5, TUG cog = 17 5 Tug cup on R = 25, L = 17  5tsts = 17 sec  BBS = 35/56   PT Findings Pt demonstrates increased fall risk based on TUG and 5tsts scores  BBS indicates need for amb with AD and fall risk   Therapeutic Exercise   Therapeutic Exercise/Activity Gait with trials of SPC in cane in R hand and cane in L hand  Pt safety improves with SPC in L hand  When in R hand, pt does not attend to device and becomes a tripping hazard to the pt  The functional objective measures were repeated 3 times and averages for scores above  Discharge Information   Patient's Discharge Plan Return home with family   Patient's Rehab Expectations Pt wants independence with ambulation and household activities   Barriers to Discharge Home Safety Considerations;Decreased Cognitive Function  (Balance)   Impressions The patient is a 63 y/o male s/p frontal and parietal craniotomies on 1/20/22 to address metatstic brain cancer  Pt presents with decreased balance, inattentive to R UE, decreased ambulation tolerance, decreased ability to ascend/descend stairs safely, cognitive deficits with decreased safety awareness  The Pt has a good rehab potential to reach mod indendent goals to return home with family support   These goals will be addressed in skilled PT through gait retraining, balance interventions, multi-tasking and problem-solving skills to improve rehab potential    PT Therapy Minutes   PT Time In 1400   PT Time Out 1530   PT Total Time (minutes) 90   PT Mode of treatment - Individual (minutes) 90   PT Mode of treatment - Concurrent (minutes) 0   PT Mode of treatment - Group (minutes) 0   PT Mode of treatment - Co-treat (minutes) 0   PT Mode of Treatment - Total time(minutes) 90 minutes   PT Cumulative Minutes 90   Cumulative Minutes   Cumulative therapy minutes 180

## 2022-01-27 NOTE — CASE MANAGEMENT
Cm met with pt and reviewed rehab routine and cm role  Pt lives with his wife and his son (dtr comes in on weekends)  His wife is able to work from home  He has 3 MANUEL and a full flight to the second floor  There is a half bath on the floor so a first floor set up is possible if absolutely needed  His wife and son can provide transport to appointments as needed  He has no DME available for d/c  He also has no hx of rehab (home, outpt, or STR)  Reviewed team meeting process and potential LOS  Following to assist w/ d/c planning needs

## 2022-01-27 NOTE — PLAN OF CARE
Problem: PAIN - ADULT  Goal: Verbalizes/displays adequate comfort level or baseline comfort level  Description: Interventions:  - Encourage patient to monitor pain and request assistance  - Assess pain using appropriate pain scale  - Administer analgesics based on type and severity of pain and evaluate response  - Implement non-pharmacological measures as appropriate and evaluate response  - Consider cultural and social influences on pain and pain management  - Notify physician/advanced practitioner if interventions unsuccessful or patient reports new pain  Outcome: Progressing     Problem: SAFETY ADULT  Goal: Patient will remain free of falls  Description: INTERVENTIONS:  - Educate patient/family on patient safety including physical limitations  - Instruct patient to call for assistance with activity   - Consult OT/PT to assist with strengthening/mobility   - Keep Call bell within reach  - Keep bed low and locked with side rails adjusted as appropriate  - Keep care items and personal belongings within reach  - Initiate and maintain comfort rounds  - Make Fall Risk Sign visible to staff  - Initiate/Maintain chair alarm  - Obtain necessary fall risk management equipment: RW  - Apply yellow socks and bracelet for high fall risk patients  - Consider moving patient to room near nurses station  Outcome: Progressing     Problem: NEUROSENSORY - ADULT  Goal: Achieves stable or improved neurological status  Description: INTERVENTIONS  - Monitor and report changes in neurological status  - Monitor vital signs such as temperature, blood pressure, glucose, and any other labs ordered   - Initiate measures to prevent increased intracranial pressure  - Monitor for seizure activity and implement precautions if appropriate      Outcome: Progressing

## 2022-01-27 NOTE — ASSESSMENT & PLAN NOTE
Hx of renal cell carcinoma s/p nephrectomy in 2009  Recurrence in 2019  Follows with [facility] as an outpatient  Recently placed in hospice in 10/2021 but was then discharged in 12/2021 due to improvement  Plans to discuss radiation treatment in 2 weeks as outpatient with Dr Robert Harrell

## 2022-01-27 NOTE — ASSESSMENT & PLAN NOTE
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

## 2022-01-27 NOTE — PROGRESS NOTES
SLP ARC TAA       01/27/22 1100   Patient Data   Rehab Impairment Impairment of mobility, safety, Activities of Daily Living (ADLs), and cognitive/communication skills due to Brain Dysfunction:  02 1  Non-Traumatic   Etiologic Diagnosis Brain Dysfunction   Date of Onset 01/20/22   Support System   Name Alexandr Graham   Relationship wife   Able to provide 24 hour supervision Yes   Able to provide physical help? Yes   Baseline Information   Vocation Other  (disability)   Transportation Family/friends drive   Prior IADL Participation   Money Management   (completes with wife)   Meal Preparation Full Participation   Laundry   (wife completes)   Home Cleaning   (wife completes)   Patient Preference   Nickname (Patient Preference) Reina Steven   Psychosocial   Psychosocial (WDL) WDL   Restrictions/Precautions   Precautions Aspiration;Bed/chair alarms;Cognitive; Fall Risk;Impulsive;Supervision on toilet/commode   Weight Bearing Restrictions No   ROM Restrictions No   Pain Assessment   Pain Assessment Tool 0-10   Pain Score No Pain   Eating Assessment   Swallow Precautions Yes   Bedside Swallow Results Yes   VBS Study Results No   Food To Mouth Yes   Positioning Upright;Out of Bed   Meal Assessed Lunch   QI: Swallowing/Nutritional Status Modified food consistency   Current Diet Dysphia III; Thin   Intake Mode PO   Finishes Timely Yes   Opens Packages No   Findings Pt presents with functional oropharyngeal stages of swallow- recommend upgrade to Regular diet textures with brief follow up to ensure tolerance  See SLP rehab note for details  Type of Assistance Needed Set-up / clean-up   Physical Assistance Level No physical assistance   Eating CARE Score 5   Comprehension   Assist Devices Glasses   Auditory Basic;Complex   Visual Basic;Complex   Findings Pt completed portions of CLQT+ assessment tool- see SLP rehab note for details  QI: Comprehension 3   Usually Understands: Understands most conversations, but misses some part/intent of message  Requires cues at times to understand  Comprehension (FIM) 5 - Needs help/cues, repetition only RARELY ( < 10% of the time)   Expression   Verbal Basic;Complex   Non-Verbal Basic;Complex   Intelligibility Sentence   Findings Pt completed portions of CLQT+ assessment tool- see SLP rehab note for details  QI: Expression 3  Exhibits some difficulty with expressing needs and ideas (e g , some words or finishing thoughts) or speech is not clear   Expression (FIM) 4 - Expresses basic info/needs 75-90% of time   Social Interaction   Cooperation with staff   Participation Individual   Behaviors observed Appropriate   Findings Pt completed portions of CLQT+ assessment tool- see SLP rehab note for details  Social Interaction (FIM) 5 - Interacts appropriately with others 90% of time   Problem Solving   Complex Manages finances;Manages discharge planning;Manages medications   Routine Manages call bell;Manges precautions   Findings Pt completed portions of CLQT+ assessment tool- see SLP rehab note for details  Problem solving (FIM) 4 - Solves basic problems 75-89% of time   Memory   Recognize People Yes   Remember Routine Yes   Initiates Tasks Yes   Short-Term Impaired   Long Term Intact   Recalls Precaution No   Findings Pt completed portions of CLQT+ assessment tool- see SLP rehab note for details  Memory (FIM) 4 - Recognizes/recalls/performs 75-89%   Discharge Information   Vocational Plan Retired/not working   Patient's Discharge Plan return home with family   Patient's Rehab Expectations "To get back to being myself"   Barriers to Discharge Home Decreased Cognitive Function;Decreased Strength;Decreased Endurance; Safety Considerations   Impressions Pt completed portions of CLQT+ assessment tool- see SLP rehab note for details   Pt presenting with MILD cognitive deficits with some word finding, comprehension, insight to deficits, problem solving, attention, safety awareness, and higher level executive functions  Pt also complete Bedside Swallow assessment where pt grossly WFL and recommended upgraded to Regular diet  Further assessment time needed to complete cognitive assessment and brief follow up to be complete for assessing tolerance with meal  At this time, pt will benefit from skilled SLP services to maximize swallow and cognitive linguistic communication skills for increased independence and decreased burden of care upon safe discharge home with family support/supervision      SLP Therapy Minutes   SLP Time In 1100   SLP Time Out 1200   SLP Total Time (minutes) 60   SLP Mode of treatment - Individual (minutes) 60   SLP Mode of treatment - Concurrent (minutes) 0   SLP Mode of treatment - Group (minutes) 0   SLP Mode of treatment - Co-treat (minutes) 0   SLP Mode of Treatment - Total time(minutes) 60 minutes   SLP Cumulative Minutes 60   Cumulative Minutes   Cumulative therapy minutes 150

## 2022-01-28 LAB
ANION GAP SERPL CALCULATED.3IONS-SCNC: 3 MMOL/L (ref 5–14)
ATRIAL RATE: 47 BPM
BUN SERPL-MCNC: 27 MG/DL (ref 5–25)
CALCIUM SERPL-MCNC: 8.4 MG/DL (ref 8.4–10.2)
CHLORIDE SERPL-SCNC: 102 MMOL/L (ref 97–108)
CO2 SERPL-SCNC: 26 MMOL/L (ref 22–30)
CREAT SERPL-MCNC: 0.68 MG/DL (ref 0.7–1.5)
GFR SERPL CREATININE-BSD FRML MDRD: 106 ML/MIN/1.73SQ M
GLUCOSE P FAST SERPL-MCNC: 125 MG/DL (ref 70–99)
GLUCOSE SERPL-MCNC: 107 MG/DL (ref 65–140)
GLUCOSE SERPL-MCNC: 125 MG/DL (ref 70–99)
GLUCOSE SERPL-MCNC: 144 MG/DL (ref 65–140)
OSMOLALITY UR/SERPL-RTO: 288 MMOL/KG (ref 282–298)
P AXIS: 27 DEGREES
POTASSIUM SERPL-SCNC: 4.4 MMOL/L (ref 3.6–5)
PR INTERVAL: 142 MS
QRS AXIS: 35 DEGREES
QRSD INTERVAL: 100 MS
QT INTERVAL: 460 MS
QTC INTERVAL: 407 MS
SODIUM SERPL-SCNC: 131 MMOL/L (ref 137–147)
T WAVE AXIS: 53 DEGREES
VENTRICULAR RATE: 47 BPM

## 2022-01-28 PROCEDURE — 97130 THER IVNTJ EA ADDL 15 MIN: CPT

## 2022-01-28 PROCEDURE — 99232 SBSQ HOSP IP/OBS MODERATE 35: CPT | Performed by: INTERNAL MEDICINE

## 2022-01-28 PROCEDURE — 99232 SBSQ HOSP IP/OBS MODERATE 35: CPT | Performed by: PHYSICAL MEDICINE & REHABILITATION

## 2022-01-28 PROCEDURE — 97112 NEUROMUSCULAR REEDUCATION: CPT

## 2022-01-28 PROCEDURE — 83930 ASSAY OF BLOOD OSMOLALITY: CPT | Performed by: PHYSICAL MEDICINE & REHABILITATION

## 2022-01-28 PROCEDURE — 97535 SELF CARE MNGMENT TRAINING: CPT

## 2022-01-28 PROCEDURE — 97530 THERAPEUTIC ACTIVITIES: CPT

## 2022-01-28 PROCEDURE — 93005 ELECTROCARDIOGRAM TRACING: CPT

## 2022-01-28 PROCEDURE — 83036 HEMOGLOBIN GLYCOSYLATED A1C: CPT | Performed by: NURSE PRACTITIONER

## 2022-01-28 PROCEDURE — 82948 REAGENT STRIP/BLOOD GLUCOSE: CPT

## 2022-01-28 PROCEDURE — 97110 THERAPEUTIC EXERCISES: CPT

## 2022-01-28 PROCEDURE — 93010 ELECTROCARDIOGRAM REPORT: CPT | Performed by: INTERNAL MEDICINE

## 2022-01-28 PROCEDURE — 97129 THER IVNTJ 1ST 15 MIN: CPT

## 2022-01-28 PROCEDURE — 80048 BASIC METABOLIC PNL TOTAL CA: CPT | Performed by: PHYSICAL MEDICINE & REHABILITATION

## 2022-01-28 PROCEDURE — 92526 ORAL FUNCTION THERAPY: CPT

## 2022-01-28 RX ADMIN — LEVETIRACETAM 1000 MG: 500 TABLET, FILM COATED ORAL at 09:02

## 2022-01-28 RX ADMIN — DEXAMETHASONE 6 MG: 4 TABLET ORAL at 21:40

## 2022-01-28 RX ADMIN — SENNOSIDES 17.2 MG: 8.6 TABLET, FILM COATED ORAL at 09:02

## 2022-01-28 RX ADMIN — PANTOPRAZOLE SODIUM 40 MG: 40 TABLET, DELAYED RELEASE ORAL at 05:53

## 2022-01-28 RX ADMIN — LEVETIRACETAM 1000 MG: 500 TABLET, FILM COATED ORAL at 21:41

## 2022-01-28 RX ADMIN — DEXAMETHASONE 6 MG: 4 TABLET ORAL at 09:03

## 2022-01-28 RX ADMIN — AMLODIPINE BESYLATE 10 MG: 10 TABLET ORAL at 09:03

## 2022-01-28 RX ADMIN — ENOXAPARIN SODIUM 40 MG: 40 INJECTION SUBCUTANEOUS at 09:03

## 2022-01-28 NOTE — PROGRESS NOTES
[facility]  Progress Note - Mariaelena Becker  1965, 64 y o  male MRN: [id number]  Unit/Bed#: -57 Encounter: [id number]  Primary Care Provider: Arjun Allen DO   Date and time admitted to hospital: 1/26/2022 11:15 AM    Hyponatremia  Assessment & Plan  Na+ currently 131  Likely related to SIADH  Started on 1800 FR on 1/27  Serum osmo pending  Continue to trend with routine BMP  Sarcomatoid renal cell carcinoma (HCC)  Assessment & Plan  Hx of renal cell carcinoma s/p nephrectomy in 2009  Recurrence in 2019  Follows with [facility] as an outpatient  Recently placed in hospice in 10/2021 but was then discharged in 12/2021 due to improvement  Plans to discuss radiation treatment in 2 weeks as outpatient with Dr Kira Somers  Essential hypertension  Assessment & Plan  Continue home amlodipine 10mg daily  Monitor BP with routine VS   Follow-up with PCP as outpatient  Mixed hyperlipidemia  Assessment & Plan  Currently not taking a statin  Last lipid panel on 2/10/21: Cholesterol 236, Triglycerides 113, HDL 40, and   Currently encouraging diet changes  Recommend repeat lipid panel as outpatient  Hyperglycemia  Assessment & Plan  Slightly elevated BG (110s) at [facility]  Likely steroid induced  Last A1c in 09/2020 was 6 0  A1c pending  Continue to trend fasting BG  * Metastatic renal cell carcinoma to brain [facility])  Assessment & Plan  Hx of renal cell carcinoma in 2009 with recurrence in 2019  Developed RUE weakness  CT head on 1/20 showed L parietal lobe mass 3 1x2 6cm and additional mass 3 2x3 2cm along with 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  1/20 - L frontal and occipital craniotomy performed by Dr Hood Nip [facility])  Started on Decadron taper in acute setting - transition to 1mg daily on 2/5 and then follow-up with Neurosurgery  Continue Keppra 1000mg Q12  Seizure precautions  Neurovascular checks Q shift    Ensure adequate pain control  Monitor incision for s/s of infection  PT/OT/Speech therapies  Primary team following  VTE Pharmacologic Prophylaxis:   Pharmacologic: Enoxaparin (Lovenox)  Mechanical VTE Prophylaxis in Place: Yes - sequential compression devices  Current Length of Stay: 2 day(s)    Current Patient Status: Inpatient Rehab     Discharge Plan: As per primary team     Code Status: Level 1 - Full Code    Subjective:   Pt examined while pt sitting in WC in pt room  Currently denies any headaches, lightheadedness, and dizziness  Feels grogginess but states that it is manageable  May be due to [address] - will reach out to Neurology about dosing  BG have been slightly elevated - was told he was diabetic before but improved after weight loss  Would prefer to change diet than start medications  Offered nutritionist but declines at this time  Therapy is going well  Has minimal incisional pain  Still not sleeping well at night but states that it is similar to his baseline  Objective:     Vitals:   Temp (24hrs), Av 6 °F (36 4 °C), Min:97 1 °F (36 2 °C), Max:97 9 °F (36 6 °C)    Temp:  [97 1 °F (36 2 °C)-97 9 °F (36 6 °C)] 97 1 °F (36 2 °C)  HR:  [49-56] 49  Resp:  [18] 18  BP: (121-133)/(66-73) 133/70  SpO2:  [97 %-99 %] 98 %  Body mass index is 22 72 kg/m²  Review of Systems   Constitutional: Positive for fatigue  Negative for appetite change, chills and fever  Respiratory: Negative for cough, shortness of breath, wheezing and stridor  Cardiovascular: Negative for chest pain, palpitations and leg swelling  Gastrointestinal: Negative for abdominal distention, abdominal pain, constipation, diarrhea, nausea and vomiting  LBM    Genitourinary: Negative for difficulty urinating  Musculoskeletal: Positive for arthralgias (minimal incisional pain, 2/10, aching)  Negative for back pain  Neurological: Negative for dizziness, weakness, light-headedness, numbness and headaches  Psychiatric/Behavioral: Positive for sleep disturbance (difficulty sleeping at night, similar to baseline)  Input and Output Summary (last 24 hours): Intake/Output Summary (Last 24 hours) at 1/28/2022 6702  Last data filed at 1/28/2022 0901  Gross per 24 hour   Intake 660 ml   Output 300 ml   Net 360 ml       Physical Exam:     Physical Exam  Vitals and nursing note reviewed  Constitutional:       Appearance: Normal appearance  HENT:      Head: Normocephalic and atraumatic  Cardiovascular:      Rate and Rhythm: Normal rate and regular rhythm  Pulses: Normal pulses  Heart sounds: Normal heart sounds  No murmur heard  No friction rub  Pulmonary:      Effort: Pulmonary effort is normal  No respiratory distress  Breath sounds: Normal breath sounds  No wheezing or rhonchi  Abdominal:      General: Abdomen is flat  Bowel sounds are normal  There is no distension  Palpations: Abdomen is soft  Tenderness: There is no abdominal tenderness  Musculoskeletal:      Cervical back: Normal range of motion and neck supple  Right lower leg: No edema  Left lower leg: No edema  Skin:     General: Skin is warm and dry  Comments: L craniotomy incision, well-approximated  SAMMY  Sutures in place  No drainage, erythema, or edema present  Neurological:      Mental Status: He is alert and oriented to person, place, and time     Psychiatric:         Mood and Affect: Mood normal          Behavior: Behavior normal          Additional Data:     Labs:    Results from last 7 days   Lab Units 01/27/22  0541   WBC Thousand/uL 8 80   HEMOGLOBIN g/dL 13 1*   HEMATOCRIT % 39 2*   PLATELETS Thousands/uL 168   NEUTROS PCT % 89*   LYMPHS PCT % 6*   MONOS PCT % 5   EOS PCT % 0     Results from last 7 days   Lab Units 01/28/22  0558   SODIUM mmol/L 131*   POTASSIUM mmol/L 4 4   CHLORIDE mmol/L 102   CO2 mmol/L 26   BUN mg/dL 27*   CREATININE mg/dL 0 68*   ANION GAP mmol/L 3*   CALCIUM mg/dL 8 4   GLUCOSE RANDOM mg/dL 125*         Results from last 7 days   Lab Units 01/28/22  0559 01/27/22  0637   POC GLUCOSE mg/dl 107 106               Labs reviewed    Imaging:    Imaging reviewed    Recent Cultures (last 7 days):           Last 24 Hours Medication List:   Current Facility-Administered Medications   Medication Dose Route Frequency Provider Last Rate    amLODIPine  10 mg Oral Daily Hannah Gaspar MD      bisacodyl  10 mg Rectal Daily PRN Hannah Gaspar MD     [facility] ON 2/5/2022] dexamethasone  1 mg Oral Daily Hannah Gaspar MD      [START ON 1/31/2022] dexamethasone  2 mg Oral Q12H [facility] Hannah Gaspar MD      [START ON 2/2/2022] dexamethasone  2 mg Oral Daily Hannah Gaspar MD      [START ON 1/29/2022] dexamethasone  4 mg Oral Q12H [facility] Hannah Gaspar MD      dexamethasone  6 mg Oral Q12H [facility] Hannah Gaspar MD      docusate sodium  100 mg Oral BID Hannah Gaspar MD      enoxaparin  40 mg Subcutaneous Daily Hannah Gaspar MD      levETIRAcetam  1,000 mg Oral Q12H [facility] Hannah Gaspar MD      magnesium citrate  148 mL Oral Once PRN Hannah Gaspar MD      ondansetron  4 mg Oral Q6H PRN Hannah Gaspar MD      oxyCODONE  10 mg Oral Q4H PRN Hannah Gaspar MD      oxyCODONE  5 mg Oral Q4H PRN Hannah Gaspar MD      pantoprazole  40 mg Oral Early Morning Hannah Gaspar MD      polyethylene glycol  17 g Oral Daily PRN Hannah Gaspar MD      senna  2 tablet Oral Daily Hannah Gaspar MD      simethicone  80 mg Oral Q6H PRN Hannah Gaspar MD      sodium phosphate-biphosphate  1 enema Rectal Once PRN Hannah Gaspar MD          M*Modal software was used to dictate this note  It may contain errors with dictating incorrect words or incorrect spelling  Please contact the provider directly with any questions

## 2022-01-28 NOTE — ASSESSMENT & PLAN NOTE
Na+ currently 131  Likely related to SIADH  Started on 1800 FR on 1/27  Serum osmo pending  Continue to trend with routine BMP

## 2022-01-28 NOTE — ASSESSMENT & PLAN NOTE
Hx of renal cell carcinoma s/p nephrectomy in 2009  Recurrence in 2019  Follows with [facility] as an outpatient  Recently placed in hospice in 10/2021 but was then discharged in 12/2021 due to improvement  Plans to discuss radiation treatment in 2 weeks as outpatient with Dr Dede Cordova

## 2022-01-28 NOTE — PLAN OF CARE
Problem: SAFETY ADULT  Goal: Patient will remain free of falls  Description: INTERVENTIONS:  - Educate patient/family on patient safety including physical limitations  - Instruct patient to call for assistance with activity   - Consult OT/PT to assist with strengthening/mobility   - Keep Call bell within reach  - Keep bed low and locked with side rails adjusted as appropriate  - Keep care items and personal belongings within reach  - Initiate and maintain comfort rounds  - Make Fall Risk Sign visible to staff  - Offer Toileting every 4Hours, in advance of need  - Initiate/Maintain alarm  - Obtain necessary fall risk management equipment:hand held  - Apply yellow socks and bracelet for high fall risk patients  - Consider moving patient to room near nurses station  Outcome: Progressing  Goal: Maintain or return to baseline ADL function  Description: INTERVENTIONS:  -  Assess patient's ability to carry out ADLs; assess patient's baseline for ADL function and identify physical deficits which impact ability to perform ADLs (bathing, care of mouth/teeth, toileting, grooming, dressing, etc )  - Assess/evaluate cause of self-care deficits   - Assess range of motion  - Assess patient's mobility; develop plan if impaired  - Assess patient's need for assistive devices and provide as appropriate  - Encourage maximum independence but intervene and supervise when necessary  - Involve family in performance of ADLs  - Assess for home care needs following discharge   - Consider OT consult to assist with ADL evaluation and planning for discharge  - Provide patient education as appropriate  Outcome: Progressing  Goal: Maintains/Returns to pre admission functional level  Description: INTERVENTIONS:  - Perform BMAT or MOVE assessment daily    - Set and communicate daily mobility goal to care team and patient/family/caregiver     - Collaborate with rehabilitation services on mobility goals if consulted  - Perform Range of Motion 4 times a day   - Reposition patient every 4 hours    -  -  - Ambulate patient 4 times a day  - Out of bed to chair 4times a day   - Out of bed for meals 3 times a day  - Out of bed for toileting  - Record patient progress and toleration of activity level   Outcome: Progressing

## 2022-01-28 NOTE — PROGRESS NOTES
01/28/22 1230   Pain Assessment   Pain Score No Pain   Sit to Stand   Type of Assistance Needed Supervision   Physical Assistance Level No physical assistance   Comment CS w/o AD   Sit to Stand CARE Score 4   Bed-Chair Transfer   Type of Assistance Needed Physical assistance   Physical Assistance Level 25% or less   Comment CGA/CS w/o AD, vc to slow down   Chair/Bed-to-Chair Transfer CARE Score 3   Toileting Hygiene   Type of Assistance Needed Physical assistance   Physical Assistance Level 25% or less   Comment CGA/CS in stance w/o AD for CM, encouragement to use RUE   Toileting Hygiene CARE Score 3   Toilet Transfer   Type of Assistance Needed Physical assistance   Physical Assistance Level 25% or less   Comment CGA/CS w/o AD to perform voiding in stance   Toilet Transfer CARE Score 3   Therapeutic Exercise - ROM   UE-ROM Yes   ROM- Right Upper Extremities   RUE ROM Comment Therapist performed PROM to [facility] with focus on supination/pronation, elbow flex/ext, wrist/digit flex/ext in order to prevent jt contractures and inc overall ROM for inc fx activities   Therapeutic Excerise-Strength   UE Strength Yes   Right Upper Extremity- Strength   R Shoulder Flexion; Extension;Horizontal ABduction   R Elbow Elbow flexion;Elbow extension   R Position Seated   Equipment Dumbbell  (3#)   R Weight/Reps/Sets 2x15   RUE Strength Comment seated therapist engaged pt in UE TE 2x15 to inc fx strength  pt overall tolerated well with noted weakne son R side with rest break in between   Left Upper Extremity-Strength   LUE Strength Comment see above   Neuromuscular Education   Comments Therapist completed LUE palmer eye coord with balloon toss while completing LUE CIMT  Pt overall toelrated well and therapist challenged same activity while completing fx mobility around unit with CGA for safety  No LOB observed  Also therapist perfrmed speed trial x5 with inc speed observed (12, 15, 18, 16, 19)   Pt overall tolerated well with vc to fully elevate RUE  Coordination   Fine Motor Therapist engaged pt with digit alternation with marbles and coinds to inc overall dexterity and inc participation in ADL tasks  Cognition   Overall Cognitive Status WFL   Arousal/Participation Alert; Responsive;Arousable; Cooperative   Attention Attends with cues to redirect   Orientation Level Oriented X4   Memory Decreased short term memory   Following Commands Follows one step commands with increased time or repetition   Activity Tolerance   Activity Tolerance Patient tolerated treatment well   Assessment   Treatment Assessment Engaged pt in 90mins of skilled OT services with focus on toileting, LUE NMR/FMC/hand eye coord, balance  Pt at this time performing at CCGA/CS level w/o AD  THerpaist engaged pt in various RUE NMR tasks to inc overall coord/fx use of RUE for inc participation in ADL tasks  Pt toelrated all activities with rest break in between and rpeorting fatigue of RUE  Pt can cont to benefit from further skilled OT services with focus on med mngmnt, activity tolrtance, balance, RUE NMR, meal prep to inc fx performance and dec caregiver burden  Prognosis Good   Problem List Decreased strength;Decreased range of motion;Decreased endurance; Impaired balance;Decreased mobility; Decreased coordination   Plan   Treatment/Interventions ADL retraining;Functional transfer training; Therapeutic exercise; Endurance training;Patient/family training;Bed mobility; Compensatory technique education   Recommendation   OT Discharge Recommendation Home with outpatient rehabilitation   OT Equipment ordered TBD   OT - OK to Discharge No   OT Therapy Minutes   OT Time In 1230   OT Time Out 1400   OT Total Time (minutes) 90   OT Mode of treatment - Individual (minutes) 90   OT Mode of treatment - Concurrent (minutes) 0   OT Mode of treatment - Group (minutes) 0   OT Mode of treatment - Co-treat (minutes) 0   OT Mode of Treatment - Total time(minutes) 90 minutes   OT Cumulative Minutes 180   Therapy Time missed   Time missed?  No

## 2022-01-28 NOTE — ASSESSMENT & PLAN NOTE
Hx of renal cell carcinoma in 2009 with recurrence in 2019  Developed RUE weakness  CT head on 1/20 showed L parietal lobe mass 3 1x2 6cm and additional mass 3 2x3 2cm along with 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  1/20 - L frontal and occipital craniotomy performed by Dr Toni Mckeon [facility])  Started on Decadron taper in acute setting - transition to 1mg daily on 2/5 and then follow-up with Neurosurgery  Continue Keppra 1000mg Q12  Seizure precautions  Neurovascular checks Q shift  Ensure adequate pain control  Monitor incision for s/s of infection  PT/OT/Speech therapies  Primary team following

## 2022-01-28 NOTE — PHYSICAL THERAPY NOTE
HEP created and reviewed with patient  Recommend continued review next session  Access Code: [email or web address]  URL: [email or web address]  Date: 01/28/2022  Prepared by: Kin Lesser    Exercises  · Seated Hip Flexion - 1 x daily - 7 x weekly - 3 sets - 10 reps  · Seated Long Arc Quad - 1 x daily - 7 x weekly - 3 sets - 10 reps  · Supine Straight Leg Raises - 1 x daily - 7 x weekly - 3 sets - 10 reps  · Supine Bridge - 1 x daily - 7 x weekly - 3 sets - 10 reps  · Seated Ankle Dorsiflexion AROM - 1 x daily - 7 x weekly - 3 sets - 10 reps  · Seated Toe Taps - 1 x daily - 7 x weekly - 3 sets - 10 reps

## 2022-01-28 NOTE — ASSESSMENT & PLAN NOTE
Slightly elevated BG (110s) at [facility]  Likely steroid induced  Last A1c in 09/2020 was 6 0  A1c pending  Continue to trend fasting BG

## 2022-01-28 NOTE — PROGRESS NOTES
01/28/22 0845   Pain Assessment   Pain Assessment Tool 0-10   Pain Score No Pain   Restrictions/Precautions   Precautions Bed/chair alarms;Cognitive; Fall Risk;Supervision on toilet/commode  (R inattention)   Cognitive Linguisitic Assessments   Cognitive Linquistic Quick Test (CLQT) Pt completing formalized cognitive linguistic assessment, CLQT+  Overall score when compared to age matched peers between 25 - 71 yrs  Score was 3 6 out of 4 0, which indicates cognitive skills to be WNL  The following cognitive domain scores are as follows: Attention: score of (180), indicating WNL; Memory: score of (145), indicating mild impairment; Executive Functions: score of (27), indicating WNL; Language: score of (27), indicating mild impairment; Visuospatial Skills: score of (90), indicatingWNL; Clock Drawing: score of (10), indicatingmild impairment  Of note, pt was below criterion cutoff scores on (2) out of 10 tasks completed during this assesssment  Per results from assessment, SLP services are recommended at this time to maximize overall cognitive linguistic skills while in the acute rehab setting  Comprehension   Comprehension (FIM) 5 - Needs help to apply glasses(visual)   Expression   Expression (FIM) 5 - Needs help/cues only RARELY (< 10% of the time)   Social Interaction   Social Interaction (FIM) 5 - Interacts appropriately with others 90% of time   Problem Solving   Problem solving (FIM) 4 - Solves basic problems 75-89% of time   Memory   Memory (FIM) 4 - Recognizes/recalls/performs 75-89%   Speech/Language/Cognition Assessmetn   Treatment Assessment Pt completed final portions of CLQT+  See above for details  Overall, pt scored WNL as compared to similar aged peers  HOWEVER, noted pt with MILD deficits in subtests Memory and Language as well as pt scoring on the lower side/cut off for "normal" for Attention and Executive Function skills   Pt verbalizes that he knows his attention and memory is poor, notes occ word finding  Discussed impulsivity and increasing insight to deficits  Pt ELOS is 7-10 days and discussed possible need for further Outpt services as well to cont to target higher level cognitive skills  Pt agreeable stating "whatever gets me back to myself again"  Pt will greatly benefit from skilled SLP services targeting functional tasks and IADLs in order to maximize cognitive linguistic communication skills for increased independence and decreased burden of care at this time  Swallow Information   Current Risks for Dysphagia & Aspiration Recent intubation;General debilitation;New Neuro event;Brain injury;Cognitive deficit; Mental status change;HX neurologic dx;HX head/neck CA   Current Diet Regular; Thin liquid   Baseline Diet Regular; Thin liquids   Consistencies Assessed and Performance   Materials Admnistered Regular/Solid; Thin liquid   Oral Stage WFL   Phargngeal Stage WFL   Swallow Mechanics WFL;Swallow initation;Good Larygneal rise   Esophageal Concerns No s/s reported   Recommendations   Diet Solid Recommendation Regular consistency   Diet Liquid Recommendation Thin liquid   Recommended Form of Meds Whole; With thin liquid; As tolerated   General Precautions Aspiration precautions; Feed only when alert;Minimize distractions;Upright as possible for all oral intake;Remain upright for 45 mins after meals  (OOB FULLY upright for meals, distant sup)   Compensatory Swallowing Strategies Alternate solids and liquids; External pacing;Effortful swallow;Voluntary throat clear/cough to clear penetration   Results Reviewed with PT/Family/Caregiver;RN   Eating   Type of Assistance Needed Independent   Physical Assistance Level No physical assistance   Eating CARE Score 6   Swallow Assessment   Swallow Treatment Assessment Pt seen for follow up dysphagia tx session with breakfast  Pt recently upgraded to regular diet yesterday- follow up to ensure tolerance and use of strategies  Pt OOB upright in chair in room   See with regular diet textures- pancake and vazquez with thin liquids  Pt able to set up self and required not assistance to self feed  Meal completion 100% and 300cc liquids  Pt eating food items c good bolus retrieval and oral control  No anterior loss  Pt eating with slight increased rate stating "I'm hungry"- cues to reduce rate and take his time  Oral processing functional for effective breakdown  Transfers and swallows timely  No overt s/s aspiration noted  Pt alternating food items c thin liquids- good retrieval and control from cup, transfers and swallows complete  No s/s aspiration noted  Nursing provided meds whole with water- pt taking one at a time with no difficulty  At this time, pt appears to be tolerating regular diet and thin liquids appropriately with good use of safe swallow strategies  Cont diet as tolerated with the following- aspiration precautions with Distant Supervision  Further strategies to include- OOB FULLY upright for meal, set up as needed, slow rate, small bites, alternate food and liquids  No further skilled SLP services warranted for dysphagia- cont skilled SLP services targeting cognitive linguistic communication needs at this time  Swallow Assessment Prognosis   Prognosis Good   Prognosis Considerations Age; Co-morbidities; Medical diagnosis; Medical prognosis;Previous level of function;Severity of impairments;New learning ability;Ability to carry over; Cooperation   SLP Therapy Minutes   SLP Time In 0845   SLP Time Out 0945   SLP Total Time (minutes) 60   SLP Mode of treatment - Individual (minutes) 60   SLP Mode of treatment - Concurrent (minutes) 0   SLP Mode of treatment - Group (minutes) 0   SLP Mode of treatment - Co-treat (minutes) 0   SLP Mode of Treatment - Total time(minutes) 60 minutes   SLP Cumulative Minutes 120   Therapy Time missed   Time missed?  No

## 2022-01-28 NOTE — PROGRESS NOTES
01/28/22 0700   Pain Assessment   Pain Assessment Tool 0-10   Pain Score No Pain   Restrictions/Precautions   Precautions Fall Risk;Bed/chair alarms;Supervision on toilet/commode  (R inattention)   General   Change In Medical/Functional Status Does not require HHA for walking, now CGA to close supervision   Cognition   Memory Decreased short term memory   Subjective   Subjective didn't sleep well, can't believe how weak his right side is   Roll Left and Right   Type of Assistance Needed Supervision   Roll Left and Right CARE Score 4   Sit to Lying   Type of Assistance Needed Supervision   Sit to Lying CARE Score 4   Lying to Sitting on Side of Bed   Type of Assistance Needed Supervision   Lying to Sitting on Side of Bed CARE Score 4   Sit to Stand   Type of Assistance Needed Supervision   Comment close supervision   Sit to Stand CARE Score 4   Bed-Chair Transfer   Type of Assistance Needed Incidental touching;Supervision   Comment no device, CGA to close supervision, no longer required HHA   Chair/Bed-to-Chair Transfer CARE Score 4   Car Transfer   Reason if not Attempted Environmental limitations   Car Transfer CARE Score 10   Walk 10 Feet   Type of Assistance Needed Supervision   Comment no device close sup   Walk 10 Feet CARE Score 4   Walk 50 Feet with Two Turns   Type of Assistance Needed Supervision   Comment no device close sup   Walk 50 Feet with Two Turns CARE Score 4   Walk 150 Feet   Type of Assistance Needed Supervision   Comment no device close sup   Walk 150 Feet CARE Score 4   Walking 10 Feet on Uneven Surfaces   Type of Assistance Needed Incidental touching   Comment indoor ramp with CGA no device   Walking 10 Feet on Uneven Surfaces CARE Score 4   Ambulation   Does the patient walk? 2  Yes   Distance Walked (feet) 350 ft   Findings R LE weakness noted with soft knee at times, but no buckling, no toe drag and no LOB   Wheelchair mobility   Does the patient use a wheelchair? 0   No   Picking Up Object   Type of Assistance Needed Incidental touching   Comment no device, bending down   Picking Up Object CARE Score 4   Therapeutic Interventions   Strengthening R LE LAQ, Seated hip flex  Fatigued eaisly  Plan to give HEP handouts next session for continued exercise in his room  Balance walking throughout unit with head movements  No c/o dizziness or LOB with flex ext, but c/o dizziness with quick cervical rotation  Educated pt on slowing down with turns for safety   Neuromuscular Re-Education walking around unit looking for objects that therapist asked him to remember form start of session, Required A initially to remember 1 item of 3  Fair problem solving t find objects around unit, safely walking w/o LOB  Walking while bounce LArge green Thera ball with B hands down elliott, Clearwave using rebounder having pt catch with B UE and throw with R hand only(blue playground ball used), walking while carrying cup of water in right hand, needed to look at his hand to prevent spilling water   Equipment Use   NuStep 10 min to start session using R UE and B E only, lvl 3 for neural priming, R UE involvement and strengthening   Assessment   Treatment Assessment Pt awake, no complaints and rady for therapy  Pt attention to right side improving, making conscious effort to pay attention and use R hand with activities  R Le weakness apparent with fatigue  Remians fall risk  HEP made and will review next session  Plan   Treatment/Interventions LE strengthening/ROM; Endurance training;Patient/family training;Equipment eval/education;Gait training; Therapeutic exercise;Cognitive reorientation   Progress Progressing toward goals   Recommendation   PT Discharge Recommendation Home with outpatient rehabilitation   PT Therapy Minutes   PT Time In 0700   PT Time Out 0800   PT Total Time (minutes) 60   PT Mode of treatment - Individual (minutes) 60   PT Mode of treatment - Concurrent (minutes) 0   PT Mode of treatment - Group (minutes) 0   PT Mode of treatment - Co-treat (minutes) 0   PT Mode of Treatment - Total time(minutes) 60 minutes   PT Cumulative Minutes 150   Therapy Time missed   Time missed?  No

## 2022-01-28 NOTE — PROGRESS NOTES
01/28/22 1100   Pain Assessment   Pain Assessment Tool 0-10   Pain Score No Pain   Assessment   Treatment Assessment HEP created and reviewed with patient  See additional note for HEP  Will contnue to review and practice during future sessions  PT Therapy Minutes   PT Time In 1100   PT Time Out 1130   PT Total Time (minutes) 30   PT Mode of treatment - Individual (minutes) 30   PT Mode of treatment - Concurrent (minutes) 0   PT Mode of treatment - Group (minutes) 0   PT Mode of treatment - Co-treat (minutes) 0   PT Mode of Treatment - Total time(minutes) 30 minutes   PT Cumulative Minutes 180   Therapy Time missed   Time missed?  No

## 2022-01-28 NOTE — PROGRESS NOTES
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 (s/p XRT to cerebellar lesion, L nephrectomy in 2009), HTN who presented to [facility] for R sided weakness  MRI brain showed brain mets on the L, and he had a frontal and occipital craniotomy on the L for biopsy on 1/20 with Dr Laura Thomas  Post-op he was started on high dose steroid taper  Radiation oncology was consulted for post-op SRT - they will plan to have him follow-up in 2 weeks with Dr Anish Robles to discuss next steps  [facility]/26  Chief Complaint: Doing well  Interval History/Subjective:  No new issues last night  Slept quite well  No new pain, headaches, lightheadedness, dizziness, changes in vision  No CP, SOB, fevers, chills, N/V, feeling quite relaxed  Last BM was 1/27  ROS:  A 10 point review of systems was negative except for what is noted in the HPI  Today's Changes:  1  Sodium stable, f/u osm  2  Continue fluid restriction for now  3  Bradycardia - sinus on EKG today  Asymptomatic, BP is maintained and normal, mental status and functional status are improving  Not on any blocking agents  Monitor closely for now  Assessment/Plan:    * Metastatic renal cell carcinoma to brain [facility])  Assessment & Plan  Originally diagnosed in 2009, with L cerebellar mets (s/p radiation) at that time  Had nephrectomy in 2009  Recurrence in 2019  Has had multiple types of systemic therapy    Now with R sided weakness, impaired motor planning/coordination, 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, with cerebral edema with midline shift, parieto/occipital hemorrhage  S/p biopsy on 1/20  Has f/u with Rad Onc in about 2 weeks with plan to discuss next steps at that point   - Closely monitor neuro status  - On decadron taper as prescribed by his team at [facility] 1000mg BID  - Seizure precautions, Delirium precautions   - PT/OT/SLP 3-5 hours a day, 5-7 days/week  - Outpatient f/u with NSx, Radiation Oncology, and Medical Oncology  Dysphagia  Assessment & Plan  Admitted on Dental Soft/Thins diet  Consulted SLP for eval  1/23 note did say he could advance to Reg/Thins, but has not yet been done  Hyponatremia  Assessment & Plan  Likely related to brain mets and recent surgery  Placing on fluid restriction 1800mL  Recheck BMP and osm on 1/28     - Na stable, osm pending    - Will continue fluid restriction over the weekend and recheck on Monday   - If change in mental status over the weekend would recheck  Sarcomatoid renal cell carcinoma ([facility] [address] )  Assessment & Plan  See "metastatic RCC to brain"     Essential hypertension  Assessment & Plan  Home: Amlodipine 5mg daily  Here: Amlodipine 10mg daily  Monitor BP and adjust as appropriate  IM consulted and management at their discretion    Mixed hyperlipidemia  Assessment & Plan  Not on statin at this time  Outpatient f/u with PCP    Hyperglycemia  Assessment & Plan  Likely 2/2 steroids  Very slightly elevated at [address] A1C 6 - recheck as per IM   Trend fasting BG  Not on CDI at this time  IM consulted  Health Maintenance  #Delirium/Sleep: At risk, has been appropriate   Sleep at times is difficult    -Overstimulation precautions, frequent re-orientation, re-direction, re-assurance  -Sleep log and agitation monitoring if needed  -Optimize sleep-wake cycle  -Limit sedating medications when possible  - Ensure optimal management electrolytes, nutrition, and hydration  - Ensure optimal bowel/bladder management  - Ensure optimal pain management   -Patient/family/caregiver education and training   -Monitor for need for 1:1   -Fall precautions with frequent rounding; proactive toileting program, patient should not be unattended in bathroom #Pain: Tylenol PRN, Oxycodone 5-10mg PRN  At home on Oxycodone PRN  #Bowel: BM 1/27 after mag citrate  Continue scheduled meds  #Bladder: Voiding and continent  #Skin/Pressure Injury Prevention: Turn Q2hr in bed, with weight shifts [id number] in wheelchair  Float heels in bed  #DVT Prophylaxis: Lovenox, SCDs  #GI Prophylaxis: Protonix  #Code Status:  Full Code  #FEN:  Dental/Thins  #Dispo:  Team 1/27: ELOS 2 weeks with plan to discharge home to supportive family  2 MANUEL, full flight of stairs to bedroom/bathroom  Goal is modified Ind with mobility and ADLs  Objective:    Functional Update:   PT:  Sup bed mobility, CGA transfers, CGA ambulation, CGA picking up objects   OT:  Ramiro for all ADLs  SLP: Mild cognitive deficits with word finding, comprehension, insight, problem solving, attention, safety awareness, higher level executive function deficits  States baseline has ADHD and poor attention  Allergies per EMR    Physical Exam:  Temp:  [97 1 °F (36 2 °C)-97 9 °F (36 6 °C)] 97 1 °F (36 2 °C)  HR:  [49-56] 49  Resp:  [18] 18  BP: (121-133)/(66-73) 133/70  Oxygen Therapy  SpO2: 98 %      Gen: No acute distress, Well-nourished, well-appearing  HEENT: Moist mucus membranes, L craniotomy sites healing well  No signs of edema or drainage/dehiscence  Cardiovascular: Sinus bradycardia  Heme/Extr: No edema  Pulmonary: Non-labored breathing  Lungs CTAB  : No elizabeth  GI: Soft, non-tender, non-distended  BS+  MSK: Observed walking and bouncing a large ball in therapy  Good gonzales, advancement, clearance, impaired balance  Integumentary: Skin is warm, dry  Neuro: AAOx3,  Speech is intact  Appropriate to questioning  Tone is normal  R sided weakness is stable  Psych: Normal mood and affect  Diagnostic Studies: Reviewed, no new imaging  Laboratory:  Reviewed as below     Results from last 7 days   Lab Units 01/27/22  0541   HEMOGLOBIN g/dL 13 1*   HEMATOCRIT % 39 2*   WBC Thousand/uL 8 80 Results from last 7 days   Lab Units 01/28/22  0558 01/27/22  0541   BUN mg/dL 27* 29*   POTASSIUM mmol/L 4 4 4 5   CHLORIDE mmol/L 102 104   CREATININE mg/dL 0 68* 0 68*            Patient Active Problem List   Diagnosis    Hyperglycemia    Mixed hyperlipidemia    Essential hypertension    Osteoarthritis of left knee    Vitamin D deficiency    Left hip pain    Weight loss, abnormal    Sarcomatoid renal cell carcinoma ([address] )    Metastatic renal cell carcinoma to brain (HCC)    Hyponatremia    Dysphagia         Medications  Current Facility-Administered Medications   Medication Dose Route Frequency Provider Last Rate    amLODIPine  10 mg Oral Daily Radha Robbins, MD      bisacodyl  10 mg Rectal Daily PRN Radha Robbins, MD Tye Estrada ON 2/5/2022] dexamethasone  1 mg Oral Daily Radha Robbins MD      [START ON 1/31/2022] dexamethasone  2 mg Oral Q12H [facility] MD Montana Schafer ON 2/2/2022] dexamethasone  2 mg Oral Daily Radha Robbins MD      [START ON 1/29/2022] dexamethasone  4 mg Oral Q12H [facility] Radha Robbins MD      dexamethasone  6 mg Oral Q12H [facility] Radha Robbins, MD      docusate sodium  100 mg Oral BID Radha Robbins, MD      enoxaparin  40 mg Subcutaneous Daily Radha Robbins, MD      levETIRAcetam  1,000 mg Oral Q12H [facility] Radha Robbins MD      magnesium citrate  148 mL Oral Once PRN Radha Robbins, MD      ondansetron  4 mg Oral Q6H PRN Radha Robbins, MD      oxyCODONE  10 mg Oral Q4H PRN Radha Robbins, MD      oxyCODONE  5 mg Oral Q4H PRN Radha Robbins, MD      pantoprazole  40 mg Oral Early Morning Radha Robbins, MD      polyethylene glycol  17 g Oral Daily PRN Radha Robbins, MD      senna  2 tablet Oral Daily Radha Robbins, MD      simethicone  80 mg Oral Q6H PRN Troyloji Robbins, MD      sodium phosphate-biphosphate  1 enema Rectal Once PRN Radha Robbins, MD            ** Please Note: Fluency Direct voice to text software may have been used in the creation of this document   **

## 2022-01-29 LAB
EST. AVERAGE GLUCOSE BLD GHB EST-MCNC: 111 MG/DL
GLUCOSE SERPL-MCNC: 105 MG/DL (ref 65–140)
HBA1C MFR BLD: 5.5 %

## 2022-01-29 PROCEDURE — 97110 THERAPEUTIC EXERCISES: CPT

## 2022-01-29 PROCEDURE — 82948 REAGENT STRIP/BLOOD GLUCOSE: CPT

## 2022-01-29 PROCEDURE — 97116 GAIT TRAINING THERAPY: CPT

## 2022-01-29 PROCEDURE — 97112 NEUROMUSCULAR REEDUCATION: CPT

## 2022-01-29 PROCEDURE — 97530 THERAPEUTIC ACTIVITIES: CPT

## 2022-01-29 PROCEDURE — 97535 SELF CARE MNGMENT TRAINING: CPT

## 2022-01-29 RX ADMIN — DEXAMETHASONE 4 MG: 4 TABLET ORAL at 09:26

## 2022-01-29 RX ADMIN — LEVETIRACETAM 1000 MG: 500 TABLET, FILM COATED ORAL at 21:41

## 2022-01-29 RX ADMIN — AMLODIPINE BESYLATE 10 MG: 10 TABLET ORAL at 09:26

## 2022-01-29 RX ADMIN — LEVETIRACETAM 1000 MG: 500 TABLET, FILM COATED ORAL at 09:26

## 2022-01-29 RX ADMIN — PANTOPRAZOLE SODIUM 40 MG: 40 TABLET, DELAYED RELEASE ORAL at 06:39

## 2022-01-29 RX ADMIN — DEXAMETHASONE 4 MG: 4 TABLET ORAL at 21:41

## 2022-01-29 RX ADMIN — ENOXAPARIN SODIUM 40 MG: 40 INJECTION SUBCUTANEOUS at 09:26

## 2022-01-29 NOTE — PLAN OF CARE
Problem: SAFETY ADULT  Goal: Patient will remain free of falls  Description: INTERVENTIONS:  - Educate patient/family on patient safety including physical limitations  - Instruct patient to call for assistance with activity   - Consult OT/PT to assist with strengthening/mobility   - Keep Call bell within reach  - Keep bed low and locked with side rails adjusted as appropriate  - Keep care items and personal belongings within reach  - Initiate and maintain comfort rounds  - Make Fall Risk Sign visible to staff  - Offer Toileting every 4 Hours, in advance of need  - Initiate/Maintain alarm  - Obtain necessary fall risk management equipment: none  - Apply yellow socks and bracelet for high fall risk patients  - Consider moving patient to room near nurses station  Outcome: Progressing  Goal: Maintain or return to baseline ADL function  Description: INTERVENTIONS:  -  Assess patient's ability to carry out ADLs; assess patient's baseline for ADL function and identify physical deficits which impact ability to perform ADLs (bathing, care of mouth/teeth, toileting, grooming, dressing, etc )  - Assess/evaluate cause of self-care deficits   - Assess range of motion  - Assess patient's mobility; develop plan if impaired  - Assess patient's need for assistive devices and provide as appropriate  - Encourage maximum independence but intervene and supervise when necessary  - Involve family in performance of ADLs  - Assess for home care needs following discharge   - Consider OT consult to assist with ADL evaluation and planning for discharge  - Provide patient education as appropriate  Outcome: Progressing  Goal: Maintains/Returns to pre admission functional level  Description: INTERVENTIONS:  - Perform BMAT or MOVE assessment daily    - Set and communicate daily mobility goal to care team and patient/family/caregiver     - Collaborate with rehabilitation services on mobility goals if consulted  - Perform Range of Motion4 times a day   - Reposition patient every 4 hours    -   -   - Out of bed to chair 4times a day   - Out of bed for meals 3 times a day  - Out of bed for toileting  - Record patient progress and toleration of activity level   Outcome: Progressing

## 2022-01-29 NOTE — PROGRESS NOTES
01/29/22 0700   Pain Assessment   Pain Score No Pain   Restrictions/Precautions   Precautions Bed/chair alarms; Fall Risk;Impulsive;Supervision on toilet/commode;Fluid restriction   Weight Bearing Restrictions No   ROM Restrictions No   Eating   Type of Assistance Needed Independent; Adaptive equipment   Physical Assistance Level No physical assistance   Comment provided with breakfast, pt able to use carlos hand to open containers and aply built up foam to utensils  Pt able to perform hand ot mouth with improved coord and no spillage observed  Will not need to fllow for feeding anymore   Eating CARE Score 6   Oral Hygiene   Type of Assistance Needed Supervision   Physical Assistance Level No physical assistance   Comment completed oral hygiene in stance at sink w/o AD at [address] [place] [address] Score 4   Shower/Bathe Self   Type of Assistance Needed Incidental touching   Physical Assistance Level No physical assistance   Comment completed shower this sessin, head incision intact  Pt reprting he  shower at home  completed shower in stance at S level with use of GB and chair behind for safety  Pt reports wife can provide S at DC for bathing   Shower/Bathe Self CARE Score 4   Bathing   Assessed Bath Style Shower   Anticipated D/C Bath Style Shower   Able to Marc Stefan No   Able to [employer] Temperature Yes   Able to Wash/Rinse/Dry (body part) Left Arm;Right Arm;R Upper Leg;L Upper Leg;L Lower Leg/Foot;R Lower Leg/Foot;Chest;Abdomen;Perineal Area; Buttocks   Limitations Noted in Balance; Endurance; Safety   Positioning Seated;Standing   Tub/Shower Transfer   Findings Completed shower transfer at [facility] level with use of GB< pt reports he has GB at home   Handout provided for shower chair for safety   Upper Body Dressing   Type of Assistance Needed Supervision   Physical Assistance Level No physical assistance   Comment seated   Upper Body Dressing CARE Score 4   Lower Body Dressing   Type of Assistance Needed Supervision   Physical Assistance Level No physical assistance   Comment seated EOB able to don undergarmnet/pants, S in stance for CM, encouragement to use R hand for CM   Lower Body Dressing CARE Score 4   Putting On/Taking Off Footwear   Type of Assistance Needed Supervision   Physical Assistance Level No physical assistance   Comment seated EOB, encouragement to use R hand to pull sock   Putting On/Taking Off Footwear CARE Score 4   Lying to Sitting on Side of Bed   Type of Assistance Needed Supervision   Physical Assistance Level No physical assistance   Lying to Sitting on Side of Bed CARE Score 4   Sit to Stand   Type of Assistance Needed Supervision   Physical Assistance Level No physical assistance   Comment CS w/o AD   Sit to Stand CARE Score 4   Bed-Chair Transfer   Type of Assistance Needed Supervision   Physical Assistance Level No physical assistance   Comment CS w/o AD   Chair/Bed-to-Chair Transfer CARE Score 4   Toileting Hygiene   Type of Assistance Needed Supervision   Physical Assistance Level No physical assistance   Toileting Hygiene CARE Score 4   Toilet Transfer   Type of Assistance Needed Supervision   Physical Assistance Level No physical assistance   Comment CS w/o AD   Toilet Transfer CARE Score 4   Health Management   Health Management Level of Assistance Modified independent   Health Management pt reporting PTA he was on on 1 med (BP)  At this time pt currently on 4meds and pt able to correctly identify 4/4 meds including name, purpose and frequency  Pt reports PTA he was taking pills from bottles and as able to complete at this level at [place]  Neuromuscular Education   Functional Movement Patterns Engaged pt in PNF with yellow tband including shoulder flex/ext, horiz abd/add 2x15 to inc overall coord/fx use of RUE   pt tolerated well with rest break in between   Coordination   Fine Motor Placed banana grams face downa nd encouraged pt to flip over with use of digit alternation to inc overall [facility] for inc participation in daily tasks, completed x2   Cognition   Overall Cognitive Status [facility]   Arousal/Participation Alert; Responsive;Arousable; Cooperative   Attention Attends with cues to redirect   Orientation Level Oriented X4   Memory Decreased short term memory   Following Commands Follows one step commands with increased time or repetition   Additional Activities   Additional Activities Other (Comment)  (bimanual use)   Additional Activities Comments in stance engaged pt in folding various clothing to encourage bimanual use/GMC/strength  Pt overall tolerated well with at times req vc to rech for clothing with RUE  Activity Tolerance   Activity Tolerance Patient tolerated treatment well   Assessment   Treatment Assessment Engaged pt in 90mins of skilled OT services with focus on self-care, RE NMR/FMC  Pt at this time completing ADLs at S level w/o AD  Recommend use of shower chair with handout provided to pt  Pt is making steady gains at this time, recommend OP OT services for inc fx use of RUE/stength  Cont skilled OT services with focus on meal prep, RUE NMR/strength, higher level balance, activity toelrance, safety to inc overall fx performance and independence  Pt reports at [place] wife and mother will be home to assists as needed  Prognosis Good   Problem List Decreased strength;Decreased range of motion;Decreased endurance; Impaired balance;Decreased mobility; Decreased coordination   Plan   Treatment/Interventions ADL retraining;Functional transfer training; Therapeutic exercise; Endurance training;Patient/family training;Bed mobility; Compensatory technique education   Progress Progressing toward goals   Recommendation   OT Discharge Recommendation Home with outpatient rehabilitation   Equipment Recommended Shower/Tub chair with back ($)   OT Equipment ordered handout provided   OT - OK to Discharge No   OT Therapy Minutes   OT Time In 0700   OT Time Out 0830   OT Total Time (minutes) 90   OT Mode of treatment - Individual (minutes) 90   OT Mode of treatment - Concurrent (minutes) 0   OT Mode of treatment - Group (minutes) 0   OT Mode of treatment - Co-treat (minutes) 0   OT Mode of Treatment - Total time(minutes) 90 minutes   OT Cumulative Minutes 270   Therapy Time missed   Time missed?  No

## 2022-01-29 NOTE — PROGRESS NOTES
01/29/22 1230   Pain Assessment   Pain Assessment Tool 0-10   Pain Score No Pain   Restrictions/Precautions   Precautions Bed/chair alarms; Fall Risk;Impulsive;Supervision on toilet/commode;Fluid restriction   Weight Bearing Restrictions No   ROM Restrictions No   Cognition   Overall Cognitive Status WFL   Arousal/Participation Alert; Responsive;Arousable; Cooperative   Attention Attends with cues to redirect   Orientation Level Oriented X4   Memory Decreased short term memory   Following Commands Follows one step commands without difficulty   Subjective   Subjective Patient presented on side of bed ready for therapy     Roll Left and Right   Type of Assistance Needed Supervision   Physical Assistance Level No physical assistance   Roll Left and Right CARE Score 4   Sit to Lying   Type of Assistance Needed Supervision   Physical Assistance Level No physical assistance   Sit to Lying CARE Score 4   Lying to Sitting on Side of Bed   Type of Assistance Needed Supervision   Physical Assistance Level No physical assistance   Lying to Sitting on Side of Bed CARE Score 4   Sit to Stand   Type of Assistance Needed Supervision   Physical Assistance Level No physical assistance   Comment no AD   Sit to Stand CARE Score 4   Bed-Chair Transfer   Type of Assistance Needed Supervision   Physical Assistance Level No physical assistance   Comment no AD   Chair/Bed-to-Chair Transfer CARE Score 4   Car Transfer   Reason if not Attempted Environmental limitations   Car Transfer CARE Score 10   Walk 10 Feet   Type of Assistance Needed Supervision   Physical Assistance Level No physical assistance   Comment no AD   Walk 10 Feet CARE Score 4   Walk 50 Feet with Two Turns   Type of Assistance Needed Supervision   Physical Assistance Level No physical assistance   Comment no AD   Walk 50 Feet with Two Turns CARE Score 4   Walk 150 Feet   Type of Assistance Needed Supervision   Physical Assistance Level No physical assistance   Comment no AD Walk 150 Feet CARE Score 4   Walking 10 Feet on Uneven Surfaces   Type of Assistance Needed Supervision   Physical Assistance Level No physical assistance   Comment no AD   Walking 10 Feet on Uneven Surfaces CARE Score 4   Ambulation   Does the patient walk? 2  Yes   Primary Mode of Locomotion Prior to Admission Walk   Distance Walked (feet) 999 ft  [facility])   Gait Pattern Ataxic   Limitations Noted In Balance; Coordination   Provided Assistance with: Balance   Walk Assist Level Close Supervision   Wheelchair mobility   Does the patient use a wheelchair? 0  No   Curb or Single Stair   Style negotiated Single stair   Type of Assistance Needed Incidental touching   Physical Assistance Level No physical assistance   Comment L hand rail   1 Step (Curb) CARE Score 4   4 Steps   Type of Assistance Needed Incidental touching   Physical Assistance Level No physical assistance   Comment L hand rail   4 Steps CARE Score 4   12 Steps   Type of Assistance Needed Incidental touching   Physical Assistance Level No physical assistance   Comment L hand rail   12 Steps CARE Score 4   Stairs   Type Stairs   # of Steps 30   Therapeutic Interventions   Strengthening Standing on foam- hip flexion, hip extension, hip extension- 20 reps, 3 second holds   Balance Practicing golf swing- 2', praticing golf swing on foam-3'; reciprocal hand tapping while walking- 3'   Neuromuscular Re-Education Side stepping and tandem stepping on foam beam- 10 cycles of 10 feet each; step up with hip drive- 20 reps each direction- forward and laterally- 20 reps, 3 second holds from foam; AP sway- 20 reps, 3 second holds   Other Stairs, ambulation, transfers, functional mobility per objective   Assessment   Treatment Assessment Patient tolerated session well today, patient progressing session well today with increased stair activity as well as community ambulation distances   Patient fatigued with steps, required standing rest breaks, patient challenged with coordination and balance activities with increased complexity and dual tasking  Patient challenged with foam activities and base of support and challenges with overall intervention tolerance  Patient requires skilled PT in order to improve his balance and maximize his function post surgery  Family/Caregiver Present no   PT Family training done with: no   Problem List Decreased strength;Decreased range of motion;Decreased endurance; Impaired balance;Decreased mobility; Decreased coordination   Plan   Treatment/Interventions ADL retraining;Functional transfer training; Therapeutic exercise; Endurance training;Patient/family training;Bed mobility; Compensatory technique education   Progress Progressing toward goals   Recommendation   PT Discharge Recommendation Home with outpatient rehabilitation   PT Therapy Minutes   PT Time In 1230   PT Time Out 1400   PT Total Time (minutes) 90   PT Mode of treatment - Individual (minutes) 90   PT Mode of treatment - Concurrent (minutes) 0   PT Mode of treatment - Group (minutes) 0   PT Mode of treatment - Co-treat (minutes) 0   PT Mode of Treatment - Total time(minutes) 90 minutes   PT Cumulative Minutes 270   Therapy Time missed   Time missed?  No

## 2022-01-29 NOTE — PLAN OF CARE
Problem: SAFETY ADULT  Goal: Patient will remain free of falls  Description: INTERVENTIONS:  - Educate patient/family on patient safety including physical limitations  - Instruct patient to call for assistance with activity   - Consult OT/PT to assist with strengthening/mobility   - Keep Call bell within reach  - Keep bed low and locked with side rails adjusted as appropriate  - Keep care items and personal belongings within reach  - Initiate and maintain comfort rounds  - Make Fall Risk Sign visible to staff  -  Initiate/Maintain bed/chair alarm  - Apply yellow socks and bracelet for high fall risk patients  - Consider moving patient to room near nurses station  Outcome: Progressing

## 2022-01-30 LAB — GLUCOSE SERPL-MCNC: 122 MG/DL (ref 65–140)

## 2022-01-30 PROCEDURE — 97110 THERAPEUTIC EXERCISES: CPT

## 2022-01-30 PROCEDURE — 82948 REAGENT STRIP/BLOOD GLUCOSE: CPT

## 2022-01-30 PROCEDURE — 97129 THER IVNTJ 1ST 15 MIN: CPT

## 2022-01-30 PROCEDURE — 97530 THERAPEUTIC ACTIVITIES: CPT

## 2022-01-30 PROCEDURE — 97112 NEUROMUSCULAR REEDUCATION: CPT

## 2022-01-30 PROCEDURE — 97130 THER IVNTJ EA ADDL 15 MIN: CPT

## 2022-01-30 RX ADMIN — DEXAMETHASONE 4 MG: 4 TABLET ORAL at 21:08

## 2022-01-30 RX ADMIN — ENOXAPARIN SODIUM 40 MG: 40 INJECTION SUBCUTANEOUS at 08:56

## 2022-01-30 RX ADMIN — LEVETIRACETAM 1000 MG: 500 TABLET, FILM COATED ORAL at 08:57

## 2022-01-30 RX ADMIN — SENNOSIDES 8.6 MG: 8.6 TABLET, FILM COATED ORAL at 08:57

## 2022-01-30 RX ADMIN — LEVETIRACETAM 1000 MG: 500 TABLET, FILM COATED ORAL at 21:08

## 2022-01-30 RX ADMIN — DEXAMETHASONE 4 MG: 4 TABLET ORAL at 08:56

## 2022-01-30 RX ADMIN — AMLODIPINE BESYLATE 10 MG: 10 TABLET ORAL at 08:57

## 2022-01-30 RX ADMIN — PANTOPRAZOLE SODIUM 40 MG: 40 TABLET, DELAYED RELEASE ORAL at 06:05

## 2022-01-30 NOTE — PROGRESS NOTES
01/30/22 0930   Pain Assessment   Pain Assessment Tool 0-10   Pain Score No Pain   Restrictions/Precautions   Precautions Fall Risk   Weight Bearing Restrictions No   ROM Restrictions No   Sit to Stand   Type of Assistance Needed Supervision   Physical Assistance Level No physical assistance   Comment no AD   Sit to Stand CARE Score 4   Right Upper Extremity- Strength   RUE Strength Comment UE TE using yellow theraband 3x10 in available planes of motion  Pt tolerated well w/ rest breaks, noted RUE fatigues more quickly than LUE  Completed to increase BUE strength for increased IND w/ ADLs  Left Upper Extremity-Strength   LUE Strength Comment UE TE using yellow theraband 3x10 in available planes of motion  Pt tolerated well w/ rest breaks, noted RUE fatigues more quickly than LUE  Completed to increase BUE strength for increased IND w/ ADLs  Cognition   Overall Cognitive Status WFL   Arousal/Participation Alert; Cooperative   Attention Within functional limits   Orientation Level Oriented X4   Following Commands Follows one step commands with increased time or repetition   Additional Activities   Additional Activities Comments Fxnl reach task in stance pt reaching far overhead at times standing on toes to reach resistance clips on curtain, and bending down to shin height to retrieve clips  Pt tolerated well w/ no LOB, dizziness  Completed higher level balance and reach task to improve overall activity tolerance and to address remaining balance deficits  Pt uses RUE for reaching task as part of [facility] rehab  Activity Tolerance   Activity Tolerance Patient tolerated treatment well   Other Comments   Assessment Fxnl mobility around unit while navigating hallways w/ staged clutter  Pt completed w/ distant sup, no LOB, no bumping into objects   Pt states, "I feel best when I'm doing this (walking activities)"   Assessment   Treatment Assessment OT session focusing on fxnl reach and higher level balance tasks, UE TE/NMR, and fxnl mobilty  Pt tolerated session well and progressing towards OT goals  OT to continue to treat and follow established POC  Prognosis Good   Problem List Decreased strength;Decreased range of motion;Decreased endurance; Impaired balance;Decreased mobility; Decreased coordination   Plan   Treatment/Interventions ADL retraining;Functional transfer training; Therapeutic exercise; Endurance training;Patient/family training;Equipment eval/education; Compensatory technique education;Continued evaluation   Progress Progressing toward goals   OT Therapy Minutes   OT Time In 0930   OT Time Out 1030   OT Total Time (minutes) 60   OT Mode of treatment - Individual (minutes) 60   OT Mode of treatment - Concurrent (minutes) 0   OT Mode of treatment - Group (minutes) 0   OT Mode of treatment - Co-treat (minutes) 0   OT Mode of Treatment - Total time(minutes) 60 minutes   OT Cumulative Minutes 330   Therapy Time missed   Time missed?  No

## 2022-01-30 NOTE — PLAN OF CARE
Problem: SAFETY ADULT  Goal: Patient will remain free of falls  Description: INTERVENTIONS:  - Educate patient/family on patient safety including physical limitations  - Instruct patient to call for assistance with activity   - Consult OT/PT to assist with strengthening/mobility   - Keep Call bell within reach  - Keep bed low and locked with side rails adjusted as appropriate  - Keep care items and personal belongings within reach  - Initiate and maintain comfort rounds  - Make Fall Risk Sign visible to staff  - Offer Toileting every 4 Hours, in advance of need  -  - Obtain necessary fall risk management equipment: none  - Apply yellow socks and bracelet for high fall risk patients  - Consider moving patient to room near nurses station  Outcome: Progressing  Goal: Maintain or return to baseline ADL function  Description: INTERVENTIONS:  -  Assess patient's ability to carry out ADLs; assess patient's baseline for ADL function and identify physical deficits which impact ability to perform ADLs (bathing, care of mouth/teeth, toileting, grooming, dressing, etc )  - Assess/evaluate cause of self-care deficits   - Assess range of motion  - Assess patient's mobility; develop plan if impaired  - Assess patient's need for assistive devices and provide as appropriate  - Encourage maximum independence but intervene and supervise when necessary  - Involve family in performance of ADLs  - Assess for home care needs following discharge   - Consider OT consult to assist with ADL evaluation and planning for discharge  - Provide patient education as appropriate  Outcome: Progressing  Goal: Maintains/Returns to pre admission functional level  Description: INTERVENTIONS:  - Perform BMAT or MOVE assessment daily    - Set and communicate daily mobility goal to care team and patient/family/caregiver  - Collaborate with rehabilitation services on mobility goals if consulted  - Perform Range of Motion 4times a day    - Reposition patient every 4 hours    -  -   - Ambulate patient 4 times a day  - Out of bed to chair 4times a day   - Out of bed for meals 3times a day  - Out of bed for toileting  - Record patient progress and toleration of activity level   Outcome: Progressing

## 2022-01-30 NOTE — PROGRESS NOTES
01/30/22 1330   Pain Assessment   Pain Assessment Tool 0-10   Pain Score No Pain   Restrictions/Precautions   Precautions Bed/chair alarms;Cognitive; Fall Risk;Supervision on toilet/commode; Impulsive   Comprehension   Comprehension (FIM) 5 - Understands basic directions and conversation   Expression   Expression (FIM) 5 - Needs help/cues only RARELY (< 10% of the time)   Social Interaction   Social Interaction (FIM) 5 - Interacts appropriately with others 90% of time   Problem Solving   Problem solving (FIM) 4 - Solves basic problems 75-89% of time   Memory   Memory (FIM) 4 - Recognizes/recalls/performs 75-89%   Speech/Language/Cognition Assessmetn   Treatment Assessment Patient participated in skilled ST session focusing on cognitive linguistic skills  As this SLP is new to this patient's care, engaged in rapport building  Patient demonstrated recall of biographical info, work hx and recall of recent events regarding medical diagnosis with increased detail  Patient's responses were thorough and complete, noting only occasional hesitations which were only noted as this SLP was monitoring for these occurrences  No overt word finding deficits observed  When reviewing patient's recent deficit areas, patient reports improvement in overall speech and feels as though his attention is at baseline as he had difficulties prior to this  Session then continued by engaging in more structured tasks, completing a variety of activities  Presented with a basic money management task, patient determined the sum of three written amounts/types of coins for 11/15 trials independently where he improved to 15/15 accuracy when SLP cued patient to errored trials and he recalculated totals  Patient was initially impulsive with task and required verbal cues to slow his rate of complete which improved accuracy  Targeting working memory, patient was verbally presented with a Fo4 words during a word list retention task   Approx 5-10 seconds after presentation of target words, patient accurately recalled words to answer questions related to category inclusion for 16/17 trials, improving recall when provided with a single verbal cue  Lastly, patient completed a visual attention task utilizing a mock calendar  Patient again appeared impulsive with task initiation, benefiting from verbal cues to take his time  After this time, patient then independently identified 6/9 errors related to items planned and calendar format  Patient benefited from verbal cues to improve awareness of errors, as well as verbal/semantic cues to increase understanding of reasoning of errors  At end of session, patient requested to return to bed  Patient was impulsive in his transfer as he attempted prior to SLP getting in position to assist, noting that due to the positioning of his wheelchair, patient would also not have been able to clearly reach the bed without increasing his risk of falling or the WC blocking his bed  SLP re-educated patient on need for assistance at this time and cued for needing to move WC to safely ambulate to the bed, noting decreased problem solving skills  At this time, patient continues to present with cognitive linguistic deficits and is recommended for further skilled SLP services to maximize overall skills and independence  SLP Therapy Minutes   SLP Time In 1330   SLP Time Out 1430   SLP Total Time (minutes) 60   SLP Mode of treatment - Individual (minutes) 60   SLP Mode of treatment - Concurrent (minutes) 0   SLP Mode of treatment - Group (minutes) 0   SLP Mode of treatment - Co-treat (minutes) 0   SLP Mode of Treatment - Total time(minutes) 60 minutes   SLP Cumulative Minutes 180   Therapy Time missed   Time missed?  No

## 2022-01-31 PROBLEM — R00.1 BRADYCARDIA: Status: ACTIVE | Noted: 2022-01-31

## 2022-01-31 LAB
ANION GAP SERPL CALCULATED.3IONS-SCNC: 0 MMOL/L (ref 5–14)
BUN SERPL-MCNC: 22 MG/DL (ref 5–25)
CALCIUM SERPL-MCNC: 8.3 MG/DL (ref 8.4–10.2)
CHLORIDE SERPL-SCNC: 101 MMOL/L (ref 97–108)
CO2 SERPL-SCNC: 31 MMOL/L (ref 22–30)
CREAT SERPL-MCNC: 0.67 MG/DL (ref 0.7–1.5)
ERYTHROCYTE [DISTWIDTH] IN BLOOD BY AUTOMATED COUNT: 13.4 %
GFR SERPL CREATININE-BSD FRML MDRD: 107 ML/MIN/1.73SQ M
GLUCOSE P FAST SERPL-MCNC: 122 MG/DL (ref 70–99)
GLUCOSE SERPL-MCNC: 119 MG/DL (ref 65–140)
GLUCOSE SERPL-MCNC: 122 MG/DL (ref 70–99)
HCT VFR BLD AUTO: 37.9 % (ref 41–53)
HGB BLD-MCNC: 13.2 G/DL (ref 13.5–17.5)
LYMPHOCYTES # BLD AUTO: 0.74 THOUSAND/UL (ref 0.5–4)
LYMPHOCYTES # BLD AUTO: 7 % (ref 25–45)
MCH RBC QN AUTO: 31.6 PG (ref 26–34)
MCHC RBC AUTO-ENTMCNC: 34.8 G/DL (ref 31–36)
MCV RBC AUTO: 91 FL (ref 80–100)
MONOCYTES # BLD AUTO: 0.21 THOUSAND/UL (ref 0.2–0.9)
MONOCYTES NFR BLD AUTO: 2 % (ref 1–10)
NEUTS BAND NFR BLD MANUAL: 1 % (ref 0–8)
NEUTS SEG # BLD: 9.56 THOUSAND/UL (ref 1.8–7.8)
NEUTS SEG NFR BLD AUTO: 90 %
PLATELET # BLD AUTO: 179 THOUSANDS/UL (ref 150–450)
PLATELET BLD QL SMEAR: ADEQUATE
PMV BLD AUTO: 8 FL (ref 8.9–12.7)
POTASSIUM SERPL-SCNC: 4.5 MMOL/L (ref 3.6–5)
RBC # BLD AUTO: 4.16 MILLION/UL (ref 4.5–5.9)
RBC MORPH BLD: NORMAL
SODIUM SERPL-SCNC: 132 MMOL/L (ref 137–147)
TOTAL CELLS COUNTED SPEC: 100
WBC # BLD AUTO: 10.5 THOUSAND/UL (ref 4.5–11)

## 2022-01-31 PROCEDURE — 99232 SBSQ HOSP IP/OBS MODERATE 35: CPT | Performed by: PHYSICAL MEDICINE & REHABILITATION

## 2022-01-31 PROCEDURE — 97530 THERAPEUTIC ACTIVITIES: CPT

## 2022-01-31 PROCEDURE — 97116 GAIT TRAINING THERAPY: CPT

## 2022-01-31 PROCEDURE — 97535 SELF CARE MNGMENT TRAINING: CPT

## 2022-01-31 PROCEDURE — 85007 BL SMEAR W/DIFF WBC COUNT: CPT | Performed by: NURSE PRACTITIONER

## 2022-01-31 PROCEDURE — 97112 NEUROMUSCULAR REEDUCATION: CPT

## 2022-01-31 PROCEDURE — 85027 COMPLETE CBC AUTOMATED: CPT | Performed by: NURSE PRACTITIONER

## 2022-01-31 PROCEDURE — 82948 REAGENT STRIP/BLOOD GLUCOSE: CPT

## 2022-01-31 PROCEDURE — 99233 SBSQ HOSP IP/OBS HIGH 50: CPT | Performed by: INTERNAL MEDICINE

## 2022-01-31 PROCEDURE — 80048 BASIC METABOLIC PNL TOTAL CA: CPT | Performed by: NURSE PRACTITIONER

## 2022-01-31 PROCEDURE — 97110 THERAPEUTIC EXERCISES: CPT

## 2022-01-31 RX ADMIN — SENNOSIDES 17.2 MG: 8.6 TABLET, FILM COATED ORAL at 09:37

## 2022-01-31 RX ADMIN — PANTOPRAZOLE SODIUM 40 MG: 40 TABLET, DELAYED RELEASE ORAL at 05:49

## 2022-01-31 RX ADMIN — LEVETIRACETAM 1000 MG: 500 TABLET, FILM COATED ORAL at 21:19

## 2022-01-31 RX ADMIN — LEVETIRACETAM 1000 MG: 500 TABLET, FILM COATED ORAL at 09:37

## 2022-01-31 RX ADMIN — ENOXAPARIN SODIUM 40 MG: 40 INJECTION SUBCUTANEOUS at 09:37

## 2022-01-31 RX ADMIN — DEXAMETHASONE 2 MG: 4 TABLET ORAL at 09:37

## 2022-01-31 RX ADMIN — DEXAMETHASONE 2 MG: 4 TABLET ORAL at 21:19

## 2022-01-31 RX ADMIN — AMLODIPINE BESYLATE 10 MG: 10 TABLET ORAL at 09:37

## 2022-01-31 NOTE — ASSESSMENT & PLAN NOTE
Hx of renal cell carcinoma s/p nephrectomy in 2009  Recurrence in 2019  Follows with [facility] as an outpatient  Recently placed in hospice in 10/2021 but was then discharged in 12/2021 due to improvement  Plans to discuss radiation treatment in 2 weeks as outpatient with Dr Juan Antonio Gonzalez

## 2022-01-31 NOTE — ASSESSMENT & PLAN NOTE
New onset bradycardia on 1/28  EKG obtained - showed sinus bradycardia, 47BPM   Asymptomatic  Avoid beta-blockers    Continue to monitor with routine VS

## 2022-01-31 NOTE — PLAN OF CARE
Problem: SAFETY ADULT  Goal: Patient will remain free of falls  Description: INTERVENTIONS:  - Educate patient/family on patient safety including physical limitations  - Instruct patient to call for assistance with activity   - Consult OT/PT to assist with strengthening/mobility   - Keep Call bell within reach  - Keep bed low and locked with side rails adjusted as appropriate  - Keep care items and personal belongings within reach  - Initiate and maintain comfort rounds  - Make Fall Risk Sign visible to staff  - Initiate/Maintain bed alarm  - Obtain necessary fall risk management equipment: call bell  - Apply yellow socks and bracelet for high fall risk patients  - Consider moving patient to room near nurses station  Outcome: Progressing     Problem: GASTROINTESTINAL - ADULT  Goal: Maintains or returns to baseline bowel function  Description: INTERVENTIONS:  - Assess bowel function  - Encourage oral fluids to ensure adequate hydration  - Administer IV fluids if ordered to ensure adequate hydration  - Administer ordered medications as needed  - Encourage mobilization and activity  - Consider nutritional services referral to assist patient with adequate nutrition and appropriate food choices  Outcome: Progressing

## 2022-01-31 NOTE — PROGRESS NOTES
01/31/22 1330   Pain Assessment   Pain Assessment Tool 0-10   Pain Score No Pain   Restrictions/Precautions   Precautions Fall Risk   Roll Left and Right   Type of Assistance Needed Independent   Roll Left and Right CARE Score 6   Sit to Lying   Type of Assistance Needed Independent   Sit to Lying CARE Score 6   Lying to Sitting on Side of Bed   Type of Assistance Needed Independent   Lying to Sitting on Side of Bed CARE Score 6   Sit to Stand   Type of Assistance Needed Independent   Sit to Stand CARE Score 6   Bed-Chair Transfer   Type of Assistance Needed Independent   Comment no device   Chair/Bed-to-Chair Transfer CARE Score 6   Walk 10 Feet   Type of Assistance Needed Independent   Comment no device   Walk 10 Feet CARE Score 6   Walk 50 Feet with Two Turns   Type of Assistance Needed Supervision   Comment DS no device   Walk 50 Feet with Two Turns CARE Score 4   Walk 150 Feet   Type of Assistance Needed Supervision   Comment DS no device   Walk 150 Feet CARE Score 4   Walking 10 Feet on Uneven Surfaces   Type of Assistance Needed Supervision   Walking 10 Feet on Uneven Surfaces CARE Score 4   Ambulation   Does the patient walk? 2  Yes   Primary Mode of Locomotion Prior to Admission Walk   Distance Walked (feet) 800 ft   Walk Assist Level Supervision   Findings Progress to independent tomorrow,  slight drift at times with head turns but pt does well with self recover- no overt LOB needing assistance   Wheel 50 Feet with Two Turns   Reason if not Attempted Activity not applicable   Wheel 50 Feet with Two Turns CARE Score 9   Wheel 150 Feet   Reason if not Attempted Activity not applicable   Wheel 100 Feet CARE Score 9   Wheelchair mobility   Does the patient use a wheelchair? 0   No   Curb or Single Stair   Style negotiated Single stair   Type of Assistance Needed Supervision   1 Step (Curb) CARE Score 4   4 Steps   Type of Assistance Needed Supervision   Comment single HR    4 Steps CARE Score 4   12 Steps Type of Assistance Needed Supervision   Comment single HR    12 Steps CARE Score 4   Stairs   Type Stairs   # of Steps 24   Assist Devices Single Rail   Findings no trip or mis step performed 2 full flights with reciprocal pattern   Picking Up Object   Type of Assistance Needed Independent   Comment no reacher   Picking Up Object CARE Score 6   Toilet Transfer   Type of Assistance Needed Independent   Toilet Transfer CARE Score 6   Therapeutic Interventions   Neuromuscular Re-Education Amb faster pace with 4# on ankle for asymetry challenge 250x2  performed with each leg,   Amb multiple bouts with reciprocal stepping over (canes, QCs and small bolster)  initially R toe catch a few times but improved with reps,  Amb 250 feet resisted gt with perturbations using black Tband,  amb 250 with pivot on command, walk backwards,     Equipment Use   NuStep 12 mins L3 for neuro prime   Assessment   Treatment Assessment 90 min session focused on NPP and progressing pt to independent in room  Pt showed good balance recovery with challenges and should be progressed to independent with ambulation that he has reached his goals  Capture care scores tomorrow and progress for ambulation longer distances      Recommendation   PT Discharge Recommendation Home with outpatient rehabilitation   PT Therapy Minutes   PT Time In 1330   PT Time Out 1500   PT Total Time (minutes) 90   PT Mode of treatment - Individual (minutes) 90   PT Mode of treatment - Concurrent (minutes) 0   PT Mode of treatment - Group (minutes) 0   PT Mode of treatment - Co-treat (minutes) 0   PT Mode of Treatment - Total time(minutes) 90 minutes   PT Cumulative Minutes 360

## 2022-01-31 NOTE — ASSESSMENT & PLAN NOTE
Na+ currently 132  Likely related to SIADH  Started on 1800 FR on 1/27  Serum osmo 288 on 1/28  Continue to trend with routine BMP

## 2022-01-31 NOTE — PROGRESS NOTES
01/31/22 1000   Pain Assessment   Pain Score No Pain   Restrictions/Precautions   Precautions Fall Risk   Weight Bearing Restrictions No   ROM Restrictions No   Sit to Lying   Type of Assistance Needed Independent   Physical Assistance Level No physical assistance   Sit to Lying CARE Score 6   Lying to Sitting on Side of Bed   Type of Assistance Needed Independent   Physical Assistance Level No physical assistance   Lying to Sitting on Side of Bed CARE Score 6   Sit to Stand   Type of Assistance Needed Independent   Physical Assistance Level No physical assistance   Comment no AD, progressed to IRP   Sit to Stand CARE Score 6   Bed-Chair Transfer   Type of Assistance Needed Independent   Physical Assistance Level No physical assistance   Comment no AD, progressed to IRP   Chair/Bed-to-Chair Transfer CARE Score 6   Toileting Hygiene   Type of Assistance Needed Independent   Physical Assistance Level No physical assistance   Comment no AD, progressed to [address] [facility] [address] Score 6   Toilet Transfer   Type of Assistance Needed Independent   Physical Assistance Level No physical assistance   Comment no AD, progressed to IRP   Toilet Transfer CARE Score 6   Meal Prep   Meal Prep Level of Assistance Independent   Meal Preparation Engaged pt in light meal prep making fried egg at stove top  Pt able to turn stove on/off w/o vc and safety throughout task  Pt reporting he makes eggs, oatmeal or egg muffin at home, however rpeorts that wife can assist as well   Kitchen Mobility   Kitchen Activity Retrieve items;Transport items   Kitchen Mobility Comments Kaycee to retrieve items from refrigerator and tranporting to counter  Kaycee with no LB osberved   Therapeutic Excerise-Strength   UE Strength Yes   Right Upper Extremity- Strength   R Shoulder Flexion; Extension;Horizontal ABduction   R Elbow Elbow flexion;Elbow extension   R Position Seated   Equipment Dumbbell  (4#)   R Weight/Reps/Sets 2x15   RUE Strength Comment upgraded from 3#>4# 2x15 to inc fx strength  tolerated well with rest break in between  In addition, therapist provided pt with RUE HEP tputty to cont to inc RUE fx  strength with pt demonstrating understanding  Left Upper Extremity-Strength   LUE Strength Comment see above   Coordination   Fine Motor Engaged pt in playing guitar tis session with RUE to inc overall [facility] participation in daily tasks/hobbies  Pt toelrated well with at times need of repositioning and repeating 2* to pt reporting "That does not sound good" Tolerated well and needing rest break as pt rpeorting "My arm is a bit tired but that was good"   Cognition   Overall Cognitive Status [facility]   Arousal/Participation Alert; Cooperative   Attention Within functional limits   Orientation Level Oriented X4   Memory Decreased short term memory   Following Commands Follows one step commands with increased time or repetition   Additional Activities   Additional Activities   (fx mobility)   Additional Activities Comments therapist engaged pt in fx mobility [facility] w/o AD and no LOB observed  At this time does req S for fx mobility in community as setting as pt at times with dec attentiona dn environemntal orientation and req vc to return back to unit   Activity Tolerance   Activity Tolerance Patient tolerated treatment well   Assessment   Treatment Assessment Engaged pt in 90mins of skilled OT services with focus on RUE FMC< BUE TE, meal prep, comm re-entry and toileting  PPt is mkaing steady gains and was progressed to IRP w/o AD this session and Marjorie for meal prep taskk Therapist provided pt with RUE tputty HEP  Dr Karen Martines present duing end of session and in agreement for pt to DC Wed with OP PT/OT services  Cont skille doT services to finish final ADL and assess 9HPT and  strength  Prognosis Good   Problem List Decreased strength;Decreased range of motion;Decreased endurance; Impaired balance;Decreased mobility; Decreased coordination   Plan   Treatment/Interventions ADL retraining;Functional transfer training; Therapeutic exercise; Endurance training;Patient/family training;Bed mobility; Compensatory technique education   Progress Progressing toward goals   Recommendation   OT Discharge Recommendation Home with outpatient rehabilitation   Equipment Recommended Shower/Tub chair with back ($)   OT Equipment ordered handout provided   OT - OK to Discharge Yes   Discharge Summary spoke with pt and Dr Daniel Rich 2/2 with OP OT services    OT Therapy Minutes   OT Time In 1000   OT Time Out 1130   OT Total Time (minutes) 90   OT Mode of treatment - Individual (minutes) 90   OT Mode of treatment - Concurrent (minutes) 0   OT Mode of treatment - Group (minutes) 0   OT Mode of treatment - Co-treat (minutes) 0   OT Mode of Treatment - Total time(minutes) 90 minutes   OT Cumulative Minutes 420   Therapy Time missed   Time missed?  No

## 2022-01-31 NOTE — ASSESSMENT & PLAN NOTE
Slightly elevated BG (110s) at [facility]  Likely steroid induced  Last A1c in 09/2020 was 6 0  A1c on 1/28 was 5 5  Continue to trend fasting BG

## 2022-01-31 NOTE — PROGRESS NOTES
[facility]  Progress Note - Mariaelena Becker  1965, 64 y o  male MRN: [id number]  Unit/Bed#: -11 Encounter: [id number]  Primary Care Provider: Arjun Allen DO   Date and time admitted to hospital: 1/26/2022 11:15 AM    Bradycardia  Assessment & Plan  New onset bradycardia on 1/28  EKG obtained - showed sinus bradycardia, 47BPM   Asymptomatic  Avoid beta-blockers  Continue to monitor with routine VS     Hyponatremia  Assessment & Plan  Na+ currently 132  Likely related to SIADH  Started on 1800 FR on 1/27  Serum osmo 288 on 1/28  Continue to trend with routine BMP  Sarcomatoid renal cell carcinoma (HCC)  Assessment & Plan  Hx of renal cell carcinoma s/p nephrectomy in 2009  Recurrence in 2019  Follows with [facility] as an outpatient  Recently placed in hospice in 10/2021 but was then discharged in 12/2021 due to improvement  Plans to discuss radiation treatment in 2 weeks as outpatient with Dr Kira Somers  Essential hypertension  Assessment & Plan  Continue home amlodipine 10mg daily  Monitor BP with routine VS   Follow-up with PCP as outpatient  Mixed hyperlipidemia  Assessment & Plan  Currently not taking a statin  Last lipid panel on 2/10/21: Cholesterol 236, Triglycerides 113, HDL 40, and   Currently encouraging diet changes  Recommend repeat lipid panel as outpatient  Hyperglycemia  Assessment & Plan  Slightly elevated BG (110s) at [facility]  Likely steroid induced  Last A1c in 09/2020 was 6 0  A1c on 1/28 was 5 5  Continue to trend fasting BG  * Metastatic renal cell carcinoma to brain [facility])  Assessment & Plan  Hx of renal cell carcinoma in 2009 with recurrence in 2019  Developed RUE weakness  CT head on 1/20 showed L parietal lobe mass 3 1x2 6cm and additional mass 3 2x3 2cm along with 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  1/20 - L frontal and occipital craniotomy performed by Dr Sana Castaneda [facility])      Started on Decadron taper in acute setting - transition to 1mg daily on  and then follow-up with Neurosurgery  Continue Keppra 1000mg Q12  Seizure precautions  Neurovascular checks Q shift  Ensure adequate pain control  Monitor incision for s/s of infection  PT/OT/Speech therapies  Primary team following  VTE Pharmacologic Prophylaxis:   Pharmacologic: Enoxaparin (Lovenox)  Mechanical VTE Prophylaxis in Place: Yes - sequential compression devices  Current Length of Stay: 5 day(s)    Current Patient Status: Inpatient Rehab     Discharge Plan: As per primary team     Code Status: Level 1 - Full Code    Subjective:   Pt examined while pt sitting in bed in pt room  Currently denies any pain or headaches  Therapy is going well - plans for discharge on Wednesday  Feels comfortable with discharge and understands his medications  Still not sleeping well at night, but this is chronic for him  Reviewed fluid restriction and hyponatremia and pt acknowledges understanding  No other concerns or complaints at this time  Objective:     Vitals:   Temp (24hrs), Av 1 °F (36 2 °C), Min:96 9 °F (36 1 °C), Max:97 3 °F (36 3 °C)    Temp:  [96 9 °F (36 1 °C)-97 3 °F (36 3 °C)] 97 3 °F (36 3 °C)  HR:  [52-65] 52  Resp:  [18] 18  BP: (126-139)/(65-67) 139/67  SpO2:  [97 %-100 %] 100 %  Body mass index is 22 72 kg/m²  Review of Systems   Constitutional: Negative for appetite change, chills, fatigue and fever  Respiratory: Negative for cough and shortness of breath  Cardiovascular: Negative for chest pain, palpitations and leg swelling  Gastrointestinal: Negative for abdominal distention, abdominal pain, constipation, diarrhea, nausea and vomiting  LBM    Genitourinary: Negative for difficulty urinating  Musculoskeletal: Negative for arthralgias and back pain  Neurological: Negative for dizziness, weakness, light-headedness, numbness and headaches     Psychiatric/Behavioral: Positive for sleep disturbance (difficulty sleeping at night, chronic)  Input and Output Summary (last 24 hours): Intake/Output Summary (Last 24 hours) at 1/31/2022 0902  Last data filed at 1/30/2022 2105  Gross per 24 hour   Intake 480 ml   Output --   Net 480 ml       Physical Exam:     Physical Exam  Vitals and nursing note reviewed  Constitutional:       Appearance: Normal appearance  HENT:      Head: Normocephalic and atraumatic  Cardiovascular:      Rate and Rhythm: Normal rate and regular rhythm  Pulses: Normal pulses  Heart sounds: Normal heart sounds  No murmur heard  No friction rub  Pulmonary:      Effort: Pulmonary effort is normal  No respiratory distress  Breath sounds: Normal breath sounds  No wheezing or rhonchi  Abdominal:      General: Abdomen is flat  Bowel sounds are normal  There is no distension  Palpations: Abdomen is soft  Tenderness: There is no abdominal tenderness  Musculoskeletal:      Cervical back: Normal range of motion and neck supple  Right lower leg: No edema  Left lower leg: No edema  Skin:     General: Skin is warm and dry  Comments: L craniotomy incision, well-approximated  SAMMY  Sutures in place  No drainage, erythema, or edema present  Neurological:      Mental Status: He is alert and oriented to person, place, and time  Psychiatric:         Mood and Affect: Mood normal          Behavior: Behavior normal          Additional Data:     Labs:    Results from last 7 days   Lab Units 01/31/22  0557 01/27/22  0541 01/27/22  0541   WBC Thousand/uL 10 50   < > 8 80   HEMOGLOBIN g/dL 13 2*   < > 13 1*   HEMATOCRIT % 37 9*   < > 39 2*   PLATELETS Thousands/uL 179   < > 168   NEUTROS PCT %  --   --  89*   LYMPHS PCT %  --   --  6*   MONOS PCT %  --   --  5   EOS PCT %  --   --  0    < > = values in this interval not displayed       Results from last 7 days   Lab Units 01/31/22  0557   SODIUM mmol/L 132*   POTASSIUM mmol/L 4 5 CHLORIDE mmol/L 101   CO2 mmol/L 31*   BUN mg/dL 22   CREATININE mg/dL 0 67*   ANION GAP mmol/L 0*   CALCIUM mg/dL 8 3*   GLUCOSE RANDOM mg/dL 122*         Results from last 7 days   Lab Units 01/31/22  0635 01/30/22  0601 01/29/22  0627 01/28/22  1120 01/28/22  0559 01/27/22  0637   POC GLUCOSE mg/dl 119 122 105 144* 107 106     Results from last 7 days   Lab Units 01/28/22  0558   HEMOGLOBIN A1C % 5 5           Labs reviewed    Imaging:    Imaging reviewed    Recent Cultures (last 7 days):           Last 24 Hours Medication List:   Current Facility-Administered Medications   Medication Dose Route Frequency Provider Last Rate    amLODIPine  10 mg Oral Daily Kevan Etienne MD      bisacodyl  10 mg Rectal Daily PRN MD Gurmeet Epperson ON 2/5/2022] dexamethasone  1 mg Oral Daily Kevna Etienne MD      dexamethasone  2 mg Oral Q12H [address] MD Montana Epperson ON 2/2/2022] dexamethasone  2 mg Oral Daily Kevan Etienne MD      docusate sodium  100 mg Oral BID Kevan Etienne MD      enoxaparin  40 mg Subcutaneous Daily Kevan Etienne MD      levETIRAcetam  1,000 mg Oral Q12H [address] Kevan Etienne MD      magnesium citrate  148 mL Oral Once PRN Kevan Etienne MD      ondansetron  4 mg Oral Q6H PRN Kevan Etienne MD      oxyCODONE  10 mg Oral Q4H PRN Kevan Etienne MD      oxyCODONE  5 mg Oral Q4H PRN Kevan Etienne MD      pantoprazole  40 mg Oral Early Morning Kevan Etienne MD      polyethylene glycol  17 g Oral Daily PRN Kevan Etienne MD      senna  2 tablet Oral Daily Kevan Etienne MD      simethicone  80 mg Oral Q6H PRN Kevan Etienne MD      sodium phosphate-biphosphate  1 enema Rectal Once PRN Kevan Etienne MD          M*Modal software was used to dictate this note  It may contain errors with dictating incorrect words or incorrect spelling  Please contact the provider directly with any questions

## 2022-01-31 NOTE — PROGRESS NOTES
Physical Medicine and Rehabilitation Progress Note  Abe Nava  64 y o  male MRN: [id number]  Unit/Bed#: -46 Encounter: [id number]    HPI: Abe Nava  is a 64 y o  male with medical history of 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 (s/p XRT to cerebellar lesion, L nephrectomy in 2009), HTN who presented to [facility] for R sided weakness  MRI brain showed brain mets on the L, and he had a frontal and occipital craniotomy on the L for biopsy on 1/20 with Dr Ky Bianchi  Post-op he was started on high dose steroid taper  Radiation oncology was consulted for post-op SRT - they will plan to have him follow-up in 2 weeks with Dr Eddie Brandt to discuss next steps  Admitted to [facility] on 1/26  Chief Complaint: No new issues  Interval History/Subjective:  No acute events over the weekend  Did have some trouble sleeping last night - he's not sure why  Wasn't related to noise/interruptions, not having any pain  No new weakness/numbness/tingling  Very eager to go home this week  Denies any CP, SOB, fevers, chills, N/V, abdominal pain  Last BM was 1/28  ROS:  A 10 point review of systems was negative except for what is noted in the HPI  Today's Changes:  1  Sodium stable, osm was normal  Would continue fluid restriction for now   - Will need to f/u with PCP in 1  Week with repeat BMP  2  Refusing colace, discontinued  3  Discussed with patient, will need to bring family in for training, but suspect could discharge on 2/2  - Will contact his Neurosurgeon to let them know of his discharge date  - They will follow-up with him on 2/4/2022, 12:45PM at the [place] office  Total visit time: 25 minutes, with more than 50% spent counseling/coordinating care  Counseling includes discussion with patient re: progress in therapies, functional issues observed by therapy staff, and discussion with patient regarding their current medical state and wellbeing   Coordination of patient's care was performed in conjunction with Internal Medicine service to monitor patient's labs, vitals, and management of their comorbidities  Assessment/Plan:    * Metastatic renal cell carcinoma to brain [facility])  Assessment & Plan  Originally diagnosed in 2009, with L cerebellar mets (s/p radiation) at that time  Had nephrectomy in 2009  Recurrence in 2019  Has had multiple types of systemic therapy  Now with R sided weakness, impaired motor planning/coordination, 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, with cerebral edema with midline shift, parieto/occipital hemorrhage  S/p biopsy on 1/20  Has f/u with Rad Onc in about 2 weeks with plan to discuss next steps at that point   - Closely monitor neuro status  - On decadron taper as prescribed by his team at [facility] 1000mg BID  - Seizure precautions, Delirium precautions   - PT/OT/SLP 3-5 hours a day, 5-7 days/week  - Outpatient f/u with NSx, Radiation Oncology, and Medical Oncology  Bradycardia  Assessment & Plan  Sinus bradycardia on EKG  BP normal  Asymptomatic  Monitor, outpatient f/u with PCP  Dysphagia  Assessment & Plan  Admitted on Dental Soft/Thins diet, since advanced to Reg/Thins  Consulted SLP for eval  1/23 note did say he could advance to Reg/Thins, but has not yet been done  Hyponatremia  Assessment & Plan  Likely related to brain mets and recent surgery  Placing on fluid restriction 1800mL  Recheck BMP and osm on 1/28     - Na stable on 1/31, osm normal    - Continue fluid restriction     - Repeat BMP in 1 week with PCP  Sarcomatoid renal cell carcinoma ([facility] [address] )  Assessment & Plan  See "metastatic RCC to brain"     Essential hypertension  Assessment & Plan  Home: Amlodipine 5mg daily  Here: Amlodipine 10mg daily  Monitor BP and adjust as appropriate     IM consulted and management at their discretion    Mixed hyperlipidemia  Assessment & Plan  Not on statin at this time  Outpatient f/u with PCP    Hyperglycemia  Assessment & Plan  Likely 2/2 steroids  Very slightly elevated at [address] A1C 6 - recheck as per IM - 5 5   Trend fasting BG  Not on CDI at this time  IM consulted  Health Maintenance  #Delirium/Sleep: At risk, has been appropriate  Sleep at times is difficult    -Overstimulation precautions, frequent re-orientation, re-direction, re-assurance  -Sleep log and agitation monitoring if needed  -Optimize sleep-wake cycle  -Limit sedating medications when possible  - Ensure optimal management electrolytes, nutrition, and hydration  - Ensure optimal bowel/bladder management  - Ensure optimal pain management   -Patient/family/caregiver education and training   -Monitor for need for 1:1   -Fall precautions with frequent rounding; proactive toileting program, patient should not be unattended in bathroom     #Pain: Tylenol PRN, Oxycodone 5-10mg PRN  At home on Oxycodone PRN  #Bowel: BM 1/28 - continue scheduled meds  Give PRN today  #Bladder: Voiding and continent  #Skin/Pressure Injury Prevention: Turn Q2hr in bed, with weight shifts [id number] in wheelchair  Float heels in bed  #DVT Prophylaxis: Lovenox, SCDs  #GI Prophylaxis: Protonix  #Code Status:  Full Code  #FEN:  Dental/Thins  #Dispo:  Team 1/27: NICOLEOS 2 weeks with plan to discharge home to supportive family  2 MANUEL, full flight of stairs to bedroom/bathroom  Goal is modified Ind with mobility and ADLs  Objective:    Functional Update:   PT:  Sup bed mobility, CGA transfers, CGA ambulation, CGA picking up objects   OT:  Progressing to Ind with all ADLs   SLP: Sup comprehension/expression, social interaction, Ramiro problem solving/memory  Allergies per EMR    Physical Exam:  Temp:  [96 9 °F (36 1 °C)-97 3 °F (36 3 °C)] 97 3 °F (36 3 °C)  HR:  [52-65] 56  Resp:  [18] 18  BP: (126-139)/(62-67) 130/62  Oxygen Therapy  SpO2: 100 %    Gen: No acute distress, Well-nourished, well-appearing    HEENT: Moist mucus membranes, R incision site healing well with sutures in place  Cardiovascular: Sinus bradycardia  Heme/Extr: No edema  Pulmonary: Non-labored breathing  Lungs CTAB  : No elizabeth  GI: Soft, non-tender, non-distended  BS+  MSK: ROM is WFL in all extremities  No effusions or deformities  Bulk is symmetric  See below for MMT scores  Integumentary: Skin is warm, dry  Neuro: AAOx3, R pupil slightly larger, but still reactive, and stable  Mild R sided weakness which is improving  Psych: Normal mood and affect  Diagnostic Studies: Reviewed, no new imaging       Laboratory:  Reviewed    Results from last 7 days   Lab Units 01/31/22  0557 01/27/22  0541   HEMOGLOBIN g/dL 13 2* 13 1*   HEMATOCRIT % 37 9* 39 2*   WBC Thousand/uL 10 50 8 80     Results from last 7 days   Lab Units 01/31/22  0557 01/28/22  0558 01/27/22  0541   BUN mg/dL 22 27* 29*   POTASSIUM mmol/L 4 5 4 4 4 5   CHLORIDE mmol/L 101 102 104   CREATININE mg/dL 0 67* 0 68* 0 68*            Patient Active Problem List   Diagnosis    Hyperglycemia    Mixed hyperlipidemia    Essential hypertension    Osteoarthritis of left knee    Vitamin D deficiency    Left hip pain    Weight loss, abnormal    Sarcomatoid renal cell carcinoma (HCC)    Metastatic renal cell carcinoma to brain (HCC)    Hyponatremia    Dysphagia    Bradycardia         Medications  Current Facility-Administered Medications   Medication Dose Route Frequency Provider Last Rate    amLODIPine  10 mg Oral Daily Lindsay An MD      bisacodyl  10 mg Rectal Daily PRN MD Luis Daniel Weber ON 2/5/2022] dexamethasone  1 mg Oral Daily Lindsay An MD      dexamethasone  2 mg Oral Q12H [facility] MD Montana Weber ON 2/2/2022] dexamethasone  2 mg Oral Daily Lindsay An MD      enoxaparin  40 mg Subcutaneous Daily Lindsay An MD      levETIRAcetam  1,000 mg Oral Q12H [facility] Lindsay An MD      magnesium citrate  148 mL Oral Once PRN MD Luis Daniel Weber ondansetron  4 mg Oral Q6H PRN Ras Prescott MD      oxyCODONE  10 mg Oral Q4H PRN Ras Prescott MD      oxyCODONE  5 mg Oral Q4H PRN Ras Prescott MD      pantoprazole  40 mg Oral Early Morning Ras Prescott MD      polyethylene glycol  17 g Oral Daily PRN Ras Prescott MD      senna  2 tablet Oral Daily Ras Prescott MD      simethicone  80 mg Oral Q6H PRN Ras Prescott MD      sodium phosphate-biphosphate  1 enema Rectal Once PRN Ras Prescott MD            ** Please Note: Fluency Direct voice to text software may have been used in the creation of this document   **

## 2022-01-31 NOTE — ASSESSMENT & PLAN NOTE
Hx of renal cell carcinoma in 2009 with recurrence in 2019  Developed RUE weakness  CT head on 1/20 showed L parietal lobe mass 3 1x2 6cm and additional mass 3 2x3 2cm along with 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  1/20 - L frontal and occipital craniotomy performed by Dr Agustin Warren [facility])  Started on Decadron taper in acute setting - transition to 1mg daily on 2/5 and then follow-up with Neurosurgery  Continue Keppra 1000mg Q12  Seizure precautions  Neurovascular checks Q shift  Ensure adequate pain control  Monitor incision for s/s of infection  PT/OT/Speech therapies  Primary team following

## 2022-02-01 ENCOUNTER — TELEPHONE (OUTPATIENT)
Dept: HEMATOLOGY ONCOLOGY | Facility: CLINIC | Age: 57
End: 2022-02-01

## 2022-02-01 LAB — GLUCOSE SERPL-MCNC: 123 MG/DL (ref 65–140)

## 2022-02-01 PROCEDURE — 97110 THERAPEUTIC EXERCISES: CPT

## 2022-02-01 PROCEDURE — 97129 THER IVNTJ 1ST 15 MIN: CPT

## 2022-02-01 PROCEDURE — 97535 SELF CARE MNGMENT TRAINING: CPT

## 2022-02-01 PROCEDURE — 99232 SBSQ HOSP IP/OBS MODERATE 35: CPT | Performed by: INTERNAL MEDICINE

## 2022-02-01 PROCEDURE — 99233 SBSQ HOSP IP/OBS HIGH 50: CPT | Performed by: PHYSICAL MEDICINE & REHABILITATION

## 2022-02-01 PROCEDURE — 97530 THERAPEUTIC ACTIVITIES: CPT

## 2022-02-01 PROCEDURE — 97130 THER IVNTJ EA ADDL 15 MIN: CPT

## 2022-02-01 PROCEDURE — 82948 REAGENT STRIP/BLOOD GLUCOSE: CPT

## 2022-02-01 RX ORDER — PANTOPRAZOLE SODIUM 40 MG/1
40 TABLET, DELAYED RELEASE ORAL
Qty: 30 TABLET | Refills: 0 | Status: SHIPPED | OUTPATIENT
Start: 2022-02-02 | End: 2022-04-08

## 2022-02-01 RX ORDER — AMLODIPINE BESYLATE 5 MG/1
10 TABLET ORAL DAILY
Refills: 0
Start: 2022-02-01 | End: 2022-04-08 | Stop reason: SDUPTHER

## 2022-02-01 RX ORDER — LEVETIRACETAM 1000 MG/1
1000 TABLET ORAL 2 TIMES DAILY
Qty: 60 TABLET | Refills: 0 | Status: SHIPPED | OUTPATIENT
Start: 2022-02-01

## 2022-02-01 RX ORDER — DEXAMETHASONE 2 MG/1
TABLET ORAL
Qty: 17 TABLET | Refills: 0
Start: 2022-02-03 | End: 2022-03-07

## 2022-02-01 RX ORDER — SENNOSIDES 8.6 MG
17.2 TABLET ORAL
Refills: 0
Start: 2022-02-01

## 2022-02-01 RX ADMIN — PANTOPRAZOLE SODIUM 40 MG: 40 TABLET, DELAYED RELEASE ORAL at 06:09

## 2022-02-01 RX ADMIN — ENOXAPARIN SODIUM 40 MG: 40 INJECTION SUBCUTANEOUS at 09:49

## 2022-02-01 RX ADMIN — DEXAMETHASONE 2 MG: 4 TABLET ORAL at 20:25

## 2022-02-01 RX ADMIN — AMLODIPINE BESYLATE 10 MG: 10 TABLET ORAL at 09:49

## 2022-02-01 RX ADMIN — LEVETIRACETAM 1000 MG: 500 TABLET, FILM COATED ORAL at 20:25

## 2022-02-01 RX ADMIN — LEVETIRACETAM 1000 MG: 500 TABLET, FILM COATED ORAL at 09:50

## 2022-02-01 RX ADMIN — DEXAMETHASONE 2 MG: 4 TABLET ORAL at 09:49

## 2022-02-01 NOTE — PROGRESS NOTES
02/01/22 0987   Pain Assessment   Pain Assessment Tool 0-10   Pain Score No Pain   Restrictions/Precautions   Precautions Cognitive; Fall Risk   Weight Bearing Restrictions No   ROM Restrictions No   Comprehension   Comprehension (FIM) 5 - Understands basic directions and conversation   Expression   Expression (FIM) 5 - Needs help/cues only RARELY (< 10% of the time)   Social Interaction   Social Interaction (FIM) 5 - Interacts appropriately with others 90% of time   Problem Solving   Problem solving (FIM) 5 - Solves basic problems 90% of time   Memory   Memory (FIM) 5 - Needs cueing reminders <10%   Speech/Language/Cognition Assessmetn   Treatment Assessment Pt seen for skilled speech therapy session targeting cognitive linguistic communication skills  Pt alert and interactive- completed the following skilled therapy tasks  Discussion of discharge plan- pt leaving tomorrow with family support and supervision  Pt reviewed medications in previous session but reviewed once more- pt able to recall 3/4 medications he is on- provided initial letter cue (K for keppra) for the final one he was missing  Pt educated to follow up with PCP in regards to further mangement at discharge  Discussion of appointment planning- pt stated prior to this session he was making appointments for his follow ups using the [facility]  Pt stated it keeps track of it on there for him however he stated he informs his wife so they are both on the same page  Pt currently is not driving therefore dependent on rides from family  Pt stated he plans to have his brother in law assist as well with getting him to follow ups  Finally, discussed financial management- pt discussed that he does everything online, most with auto-pay but some he has to log in and do it on his own time  Pt states he doesn't necessarily needs a reminder, he just knows to do it   Pt completed simple money management tasks with adding and breaking down coin denominations- pt was able to complete adding 4 amounts with 13/15acc and breaking down with 13/15acc- cues to check work, pt was able to locate his errors on both tasks  Pt overall is improving with skilled SLP services, pt may benefit from further skilled SLP in outpt as warranted to cont to maximize overall cognitive linguistic communication abilities at this time  SLP Therapy Minutes   SLP Time In 0930   SLP Time Out 1030   SLP Total Time (minutes) 60   SLP Mode of treatment - Individual (minutes) 60   SLP Mode of treatment - Concurrent (minutes) 0   SLP Mode of treatment - Group (minutes) 0   SLP Mode of treatment - Co-treat (minutes) 0   SLP Mode of Treatment - Total time(minutes) 60 minutes   SLP Cumulative Minutes 240   Therapy Time missed   Time missed?  No

## 2022-02-01 NOTE — PROGRESS NOTES
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 (s/p XRT to cerebellar lesion, L nephrectomy in 2009), HTN who presented to [facility] for R sided weakness  MRI brain showed brain mets on the L, and he had a frontal and occipital craniotomy on the L for biopsy on 1/20 with Dr Robert Chacon  Post-op he was started on high dose steroid taper  Radiation oncology was consulted for post-op SRT - they will plan to have him follow-up in 2 weeks with Dr Naina Palacios to discuss next steps  [facility]  Chief Complaint: Excited about discharge tomorrow  Interval History/Subjective:  No acute events overnight  Actually got some sleep  No new pain, changes in vision, N/V, abdominal pain  No CP, SOB  No new bowel/bladder dysfunction  Last BM was 1/30  Would like meds sent to [facility] in [place] - already has prescription for steroids  Has appointment to talk to Radiation Oncology at [facility] remotely today, and is aware of appointment with Neurosurgery on Friday  ROS:  A 10 point review of systems was negative except for what is noted in the HPI  Today's Changes:  1  Reviewed fluid restriction and steroid taper with patient  2  Discussed with patient, will need to bring family in for training, but suspect could discharge on 2/2  - Will contact his Neurosurgeon to let them know of his discharge date  - They will follow-up with him on 2/4/2022, 12:45PM at the [place] office  Total time spent:  35 minutes, with more than 50% spent counseling/coordinating care  Counseling includes discussion with patient re: progress in therapies, functional issues observed by therapy staff, and discussion with patient his/her current medical state/wellbeing   Coordination of patient's care was performed in conjunction with Internal Medicine service to monitor patient's labs, vitals, and management of their comorbidities  Assessment/Plan:    * Metastatic renal cell carcinoma to brain [facility])  Assessment & Plan  Originally diagnosed in 2009, with L cerebellar mets (s/p radiation) at that time  Had nephrectomy in 2009  Recurrence in 2019  Has had multiple types of systemic therapy  Now with R sided weakness, impaired motor planning/coordination, 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, with cerebral edema with midline shift, parieto/occipital hemorrhage  S/p biopsy on 1/20  Has f/u with Rad Onc in about 2 weeks with plan to discuss next steps at that point   - Closely monitor neuro status  - On decadron taper as prescribed by his team at [facility] beds at the [facility] 1000mg BID  - Seizure precautions, Delirium precautions   - PT/OT/SLP 3-5 hours a day, 5-7 days/week  - Outpatient f/u with NSx, Radiation Oncology, and Medical Oncology     - Has Radiation Oncology appt 2/1, and NSx appointment 2/4 at 12:45pm      Bradycardia  Assessment & Plan  Sinus bradycardia on EKG  BP normal  Asymptomatic  Monitor, outpatient f/u with PCP  Dysphagia  Assessment & Plan  Admitted on Dental Soft/Thins diet, since advanced to Reg/Thins  SLP following  Hyponatremia  Assessment & Plan  Likely related to brain mets and recent surgery  Placing on fluid restriction 1800mL  Recheck BMP and osm on 1/28     - Na stable on 1/31, osm normal    - Continue fluid restriction     - Repeat BMP in 1 week with PCP  Sarcomatoid renal cell carcinoma ([facility] [address] )  Assessment & Plan  See "metastatic RCC to brain"     Essential hypertension  Assessment & Plan  Home: Amlodipine 5mg daily  Here: Amlodipine 10mg daily  Monitor BP and adjust as appropriate     IM consulted and management at their discretion    Mixed hyperlipidemia  Assessment & Plan  Not on statin at this time  Outpatient f/u with PCP    Hyperglycemia  Assessment & Plan  Likely 2/2 steroids  Very slightly elevated at [address] A1C 6 - recheck as per IM - 5 5   Trend fasting BG  Not on CDI at this time  IM consulted  Health Maintenance  #Delirium/Sleep: At risk, has been appropriate  Sleep at times is difficult    -Overstimulation precautions, frequent re-orientation, re-direction, re-assurance  -Sleep log and agitation monitoring if needed  -Optimize sleep-wake cycle  -Limit sedating medications when possible  - Ensure optimal management electrolytes, nutrition, and hydration  - Ensure optimal bowel/bladder management  - Ensure optimal pain management   -Patient/family/caregiver education and training   -Monitor for need for 1:1   -Fall precautions with frequent rounding; proactive toileting program, patient should not be unattended in bathroom     #Pain: Tylenol PRN, Oxycodone 5-10mg PRN  At home on Oxycodone PRN  #Bowel: BM 1/30 - continue scheduled meds  #Bladder: Voiding and continent  #Skin/Pressure Injury Prevention: Turn Q2hr in bed, with weight shifts [id number] in wheelchair  Float heels in bed  #DVT Prophylaxis: Lovenox, SCDs  #GI Prophylaxis: Protonix  #Code Status:  Full Code  #FEN:  Dental/Thins  #Dispo:  Team 1/27: ELOS 2 weeks with plan to discharge home to supportive family  2 MANUEL, full flight of stairs to bedroom/bathroom  Goal is modified Ind with mobility and ADLs  Objective:    Functional Update:   PT:  Ind for all mobility (ambulates 800' on even surfaces), except Sup for uneven surface ambulation  OT:  Independent with all ADLs  SLP: Sup comprehension/expression, social interaction, Ramiro problem solving/memory  Allergies per EMR    Physical Exam:  Temp:  [97 5 °F (36 4 °C)-97 8 °F (36 6 °C)] 97 5 °F (36 4 °C)  HR:  [57-68] 61  Resp:  [18] 18  BP: (104-132)/(53-87) 124/66  Oxygen Therapy  SpO2: 99 %    Gen: No acute distress, Well-nourished, well-appearing    HEENT: Moist mucus membranes, L craniotomy incision healing well without dehiscence or signs of infection or drainage  Sutures in place  R pupil slightly larger than L, but stable from admission and appropriately reactive  Cardiovascular: Regular rate, rhythm, S1/S2  Distal pulses palpable  Heme/Extr: No edema  Pulmonary: Non-labored breathing  Lungs CTAB  : No elizabeth  GI: Soft, non-tender, non-distended  BS+  MSK: ROM is WFL in all extremities  No effusions or deformities  Bulk is symmetric  See below for MMT scores  Integumentary: Skin is warm, dry  Neuro: AAOx3, Speech/word finding difficulty has improved  Improving R sided weakness  Appropriate to questioning  Tone is normal    Psych: Normal mood and affect  Diagnostic Studies: Reviewed, no new imaging      Laboratory:  Reviewed   Results from last 7 days   Lab Units 01/31/22  0557 01/27/22  0541   HEMOGLOBIN g/dL 13 2* 13 1*   HEMATOCRIT % 37 9* 39 2*   WBC Thousand/uL 10 50 8 80     Results from last 7 days   Lab Units 01/31/22  0557 01/28/22  0558 01/27/22  0541   BUN mg/dL 22 27* 29*   POTASSIUM mmol/L 4 5 4 4 4 5   CHLORIDE mmol/L 101 102 104   CREATININE mg/dL 0 67* 0 68* 0 68*            Patient Active Problem List   Diagnosis    Hyperglycemia    Mixed hyperlipidemia    Essential hypertension    Osteoarthritis of left knee    Vitamin D deficiency    Left hip pain    Weight loss, abnormal    Sarcomatoid renal cell carcinoma (HCC)    Metastatic renal cell carcinoma to brain (HCC)    Hyponatremia    Dysphagia    Bradycardia         Medications  Current Facility-Administered Medications   Medication Dose Route Frequency Provider Last Rate    amLODIPine  10 mg Oral Daily Bhavin Billings MD      bisacodyl  10 mg Rectal Daily PRN MD Jason Gonzalez ON 2/5/2022] dexamethasone  1 mg Oral Daily Bhavin Billings MD      dexamethasone  2 mg Oral Q12H [address] MD Montana Gonzalez ON 2/2/2022] dexamethasone  2 mg Oral Daily Bhavin Billings MD      enoxaparin  40 mg Subcutaneous Daily Delores Warren MD      levETIRAcetam  1,000 mg Oral Q12H Nena Roberson MD      magnesium citrate  148 mL Oral Once PRN Delores Warren, MD      ondansetron  4 mg Oral Q6H PRN Delores Warren, MD      oxyCODONE  10 mg Oral Q4H PRN Delores Warren, MD      oxyCODONE  5 mg Oral Q4H PRN Delores Warren, MD      pantoprazole  40 mg Oral Early Morning Delores Warren, MD      polyethylene glycol  17 g Oral Daily PRN Delores Warren, MD      senna  2 tablet Oral Daily Delores Warren, MD      simethicone  80 mg Oral Q6H PRN Delores Warren, MD      sodium phosphate-biphosphate  1 enema Rectal Once PRN Delores Warren MD            ** Please Note: Fluency Direct voice to text software may have been used in the creation of this document   **

## 2022-02-01 NOTE — PLAN OF CARE
Problem: INFECTION - ADULT  Goal: Absence or prevention of progression during hospitalization  Description: INTERVENTIONS:  - Assess and monitor for signs and symptoms of infection  - Monitor lab/diagnostic results  - Monitor all insertion sites, i e  indwelling lines, tubes, and drains  - Monitor endotracheal if appropriate and nasal secretions for changes in amount and color  - [place] appropriate cooling/warming therapies per order  - Administer medications as ordered  - Instruct and encourage patient and family to use good hand hygiene technique  - Identify and instruct in appropriate isolation precautions for identified infection/condition  Outcome: Progressing     Problem: DISCHARGE PLANNING  Goal: Discharge to home or other facility with appropriate resources  Description: INTERVENTIONS:  - Identify barriers to discharge w/patient and caregiver  - Arrange for needed discharge resources and transportation as appropriate  - Identify discharge learning needs (meds, wound care, etc )  - Arrange for interpretive services to assist at discharge as needed  - Refer to Case Management Department for coordinating discharge planning if the patient needs post-hospital services based on physician/advanced practitioner order or complex needs related to functional status, cognitive ability, or social support system  Outcome: Progressing     Problem: Knowledge Deficit  Goal: Patient/family/caregiver demonstrates understanding of disease process, treatment plan, medications, and discharge instructions  Description: Complete learning assessment and assess knowledge base    Interventions:  - Provide teaching at level of understanding  - Provide teaching via preferred learning methods  Outcome: Progressing

## 2022-02-01 NOTE — DISCHARGE SUMMARY
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 (s/p XRT to cerebellar lesion, L nephrectomy in 2009), HTN who presented to [facility] for R sided weakness  MRI brain showed brain mets on the L, and he had a frontal and occipital craniotomy on the L for biopsy on 1/20 with Dr Breana Hampton  Post-op he was started on high dose steroid taper  Radiation oncology was consulted for post-op SRT - they will plan to have him follow-up in 2 weeks with Dr Renate Krishnamurthy to discuss next steps       He was originally diagnosed in 2009, had a nephrectomy, and had a recurrence in 2019  He has had metastectomy for retroperitoneal recurrence, and has been on ipi/nivo, maintenance nivo, cabozantinib, pembrolizumab, and levatinib  He was briefly in hospice in 10/2021, but after doing better was transferred out in December 2021  He has had radiation to para-aortic lymphnode in 2021, and also to a cerebellar lesion in 2009       Of note he is claustrophobic, and needs pre-medication prior to any MRI      He was admitted to the [facility] on 1/26  Procedures Performed During [facility] Admission: None    Acute Rehabilitation Center Course: Patient participated in a comprehensive interdisciplinary inpatient rehabilitation program which included involvment of MD, therapies (PT, OT, and/or SLP), RN, CM, SW, dietary, and psychology services  He was able to be advanced to a modified independent level of assist and considered safe for discharge home  Please see below for patient's day to day management of rehabilitation needs   Please refer to Internal Medicine notes during 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, with cerebral edema with midline shift, parieto/occipital hemorrhage  S/p biopsy on 1/20  Has f/u with Rad Onc in about 2 weeks with plan to discuss next steps at that point   - Closely monitor neuro status  - On decadron taper as prescribed by his team at [facility] 1000mg BID  - Seizure precautions, Delirium precautions   - PT/OT/SLP 3-5 hours a day, 5-7 days/week  - Outpatient f/u with NSx, Radiation Oncology, and Medical Oncology     - Has Radiation Oncology appt 2/1, and NSx appointment 2/4 at 12:45pm      Bradycardia  Assessment & Plan  Sinus bradycardia on EKG  BP normal  Asymptomatic  Monitor, outpatient f/u with PCP  Dysphagia  Assessment & Plan  Admitted on Dental Soft/Thins diet, since advanced to Reg/Thins  SLP following  Hyponatremia  Assessment & Plan  Likely related to brain mets and recent surgery  Placing on fluid restriction 1800mL  Recheck BMP and osm on 1/28     - Na stable on 1/31, osm normal    - Continue fluid restriction     - Repeat BMP in 1 week with PCP  Sarcomatoid renal cell carcinoma ([facility] [address] )  Assessment & Plan  See "metastatic RCC to brain"     Essential hypertension  Assessment & Plan  Home: Amlodipine 5mg daily  Here: Amlodipine 10mg daily  Monitor BP and adjust as appropriate  IM consulted and management at their discretion    Mixed hyperlipidemia  Assessment & Plan  Not on statin at this time  Outpatient f/u with PCP    Hyperglycemia  Assessment & Plan  Likely 2/2 steroids    Very slightly elevated at [place]  Last A1C 6 - recheck as per IM - 5 5   Trend fasting BG  Not on CDI at this time  IM consulted  Discharge Physical Examination:  /67   Pulse 56   Temp 97 8 °F (36 6 °C) (Tympanic)   Resp 18   Ht 6' (1 829 m)   Wt 76 kg (167 lb 8 8 oz)   SpO2 97%   BMI 22 72 kg/m²     Gen: No acute distress, Well-nourished, well-appearing  HEENT: Moist mucus membranes, Normocephalic/Atraumatic  R pupil slightly larger than L pupil since admission but appropriate reactive bilaterally  Cardiovascular: Regular rate, rhythm, S1/S2  Distal pulses palpable  Heme/Extr: No edema  Pulmonary: Non-labored breathing  Lungs CTAB  : No elizabeth  GI: Soft, non-tender, non-distended  BS+  MSK: ROM is WFL in all extremities  No effusions or deformities  Bulk is symmetric  See below for MMT scores  Integumentary: Skin is warm, dry  Neuro: AAOx3, CN 2-12 intact  Sensation intact to light touch throughout  Speech is intact  Appropriate to questioning  Tone is normal  Coordination markedly improved  MMT:   Strength:   Right  Left  Site  Right  Left  Site    5 5  S Ab: Shoulder Abductors  5  5  HF: Hip Flexors    5 5  EF: Elbow Flexors  5  5 KF: Knee Flexors    5  5  EE: Elbow Extensors  5  5  KE: Knee Extensors    5  5  WE: Wrist Extensors  5-  5  DR: Dorsi Flexors    5-  5  FF: Finger Flexors  5  5  PF: Plantar Flexors    5 - 5  HI: Hand Intrinsics  5  5  EHL: Extensor Hallucis Longus   Psych: Normal mood and affect  Significant Findings, Care, Treatment and Services Provided: Acute comprehensive interdisciplinary inpatient rehabilitation including PT, OT, SLP, RN, CM, SW, dietary, psychology, etc     Complications: None    Functional Status Upon Admission to [facility]:  Mobility: Ramiro x2 for ambulation with HHA x2  4 steps     Transfers: Ramiro x2  ADLs:  Sup eating/grooming, Ramiro bathing, Ramiro UB/LB dressing, Dep for toileting, Ramiro x2 toilet transfers     Functional Status Upon Discharge from [facility]:   PT:  Ind for all mobility (ambulates 800' on even surfaces), except Sup for uneven surface ambulation  OT:  Independent with all ADLs  SLP: Sup comprehension/expression, social interaction, Ramiro problem solving/memory  Discharge Diagnosis: Impairment of mobility, safety and Activities of Daily Living (ADLs) due to Brain Dysfunction:  02 1  Non-Traumatic    Discharge Medications:   See after visit summary for reconciled discharge medications provided to patient and family  Condition at Discharge: good     Discharge instructions/Information to patient and family:   See after visit summary for information provided to patient and family  Provisions for Follow-Up Care:  See after visit summary for information related to follow-up care and any pertinent home health orders  No future appointments  Disposition: Home with outpatient PT    Planned Readmission: No    Discharge Statement   I spent 45 minutes discharging the patient  This time was spent on the day of discharge  I had direct contact with the patient on the day of discharge  Greater than 50% of the total time was spent examining patient, answering all patient questions, arranging and discussing plan of care with patient as well as directly providing post-discharge instructions  Additional time then spent on discharge activities  Discharge Medications:  See after visit summary for reconciled discharge medications provided to patient and family        Facility Administered Medications Prior to Discharge:    Current Facility-Administered Medications   Medication Dose Route Frequency Provider Last Rate    amLODIPine  10 mg Oral Daily Tamara Peterson MD      bisacodyl  10 mg Rectal Daily PRN MD Adrian Tyson ON 2/5/2022] dexamethasone  1 mg Oral Daily Tamara Peterson MD      dexamethasone  2 mg Oral Q12H [address] MD Montana Tyson ON 2/2/2022] dexamethasone  2 mg Oral Daily Tamara Peterson MD      enoxaparin  40 mg Subcutaneous Daily Sayda Francois MD      levETIRAcetam  1,000 mg Oral Q12H Herman Durant MD      magnesium citrate  148 mL Oral Once PRN Sayda Francois MD      ondansetron  4 mg Oral Q6H PRN Sayda Francois MD      oxyCODONE  10 mg Oral Q4H PRN Sayda Francois MD      oxyCODONE  5 mg Oral Q4H PRN Sayda Francois MD      pantoprazole  40 mg Oral Early Morning Sayda Francois MD      polyethylene glycol  17 g Oral Daily PRN Sayda Francois MD      senna  2 tablet Oral Daily Sayda Francois MD      simethicone  80 mg Oral Q6H PRN Sayda Francois MD      sodium phosphate-biphosphate  1 enema Rectal Once PRN Sayda Francois MD

## 2022-02-01 NOTE — PROGRESS NOTES
02/01/22 1230   Pain Assessment   Pain Score No Pain   Restrictions/Precautions   Precautions Fall Risk   Cognition   Arousal/Participation Alert; Cooperative   Attention Within functional limits   Memory Decreased short term memory   Following Commands Follows multistep commands with increased time or repetition   Subjective   Subjective Pt  reported that he will have a tele health/ phone call [facility] at 1:20 pm but is agreeable to participate in therapy    Roll Left and Right   Type of Assistance Needed Independent   Roll Left and Right CARE Score 6   Sit to Lying   Type of Assistance Needed Independent   Sit to Lying CARE Score 6   Lying to Sitting on Side of Bed   Type of Assistance Needed Independent   Lying to Sitting on Side of Bed CARE Score 6   Sit to Stand   Type of Assistance Needed Independent   Comment no AD   Sit to Stand CARE Score 6   Bed-Chair Transfer   Type of Assistance Needed Independent   Comment no AD   Chair/Bed-to-Chair Transfer CARE Score 6   Transfer Bed/Chair/Wheelchair   Adaptive Equipment None   Car Transfer   Reason if not Attempted Environmental limitations   Car Transfer CARE Score 10   Walk 10 Feet   Type of Assistance Needed Independent   Walk 10 Feet CARE Score 6   Walk 50 Feet with Two Turns   Type of Assistance Needed Independent   Walk 50 Feet with Two Turns CARE Score 6   Walk 150 Feet   Type of Assistance Needed Independent   Walk 150 Feet CARE Score 6   Walking 10 Feet on Uneven Surfaces   Type of Assistance Needed Independent   Comment S outside   But walked up and down ramp independently    Walking 10 Feet on Uneven Surfaces CARE Score 6   Ambulation   Does the patient walk? 2   Yes   Primary Mode of Locomotion Prior to Admission Walk   Distance Walked (feet) 999 ft   Assist Device Other  (no AD)   Gait Pattern Inconsistant [place]   Walk Assist Level Independent   Findings walked up and down the hill outside with S with no AD   Wheelchair mobility   Does the patient use a wheelchair? 0  No   Curb or Single Stair   Style negotiated Single stair   Type of Assistance Needed Supervision   1 Step (Curb) CARE Score 4   4 Steps   Type of Assistance Needed Supervision   4 Steps CARE Score 4   12 Steps   Type of Assistance Needed Supervision   12 Steps CARE Score 4   Stairs   Type Stairs   # of Steps 24   Weight Bearing Precautions Fall Risk   Assist Devices Single Rail   Picking Up Object   Type of Assistance Needed Independent   Comment no reacher    Picking Up Object CARE Score 6   Therapeutic Interventions   Flexibility B hamstring and gastroc stretching    Balance balloon taps    Other Comments   Comments Outcome measures as follows: TUG 8 32 secs, TUG cog 8 79 secs, TUG while carrying cup of water R hand 9 20 secs, with L hand 8 43 secs  5 sit to stands 9 37 secs  CONTI balance 49/56 which makes patient a low risk for falls    Assessment   Treatment Assessment Pt  tolerated session and cont to dmeonstrate good safety in functional mobility with no AD and is now [facility] with OT in agreement and nursing aware  pt  demonstrates sig improvement in balance as shown with the balance assessment above  Pt  demonstrates more challenge with SLS activities and tandem stance but otheriwse pt  has good righting reactions  Pt  able to walk outside in unven surfaces but needs S and verbal cueing for directions  Pt  had no other concerns regarding d/c  Pt  in room around 1:15 , was able to work with PT until nasim alfred called for a phonecall 's appointment      Problem List Impaired balance;Decreased coordination   Barriers to Discharge None   Recommendation   PT Discharge Recommendation Home with outpatient rehabilitation   Equipment Recommended   (no AD)   PT Therapy Minutes   PT Time In 1230   PT Time Out 1330   PT Total Time (minutes) 60   PT Mode of treatment - Individual (minutes) 60   PT Mode of treatment - Concurrent (minutes) 0   PT Mode of treatment - Group (minutes) 0   PT Mode of treatment - Co-treat (minutes) 0   PT Mode of Treatment - Total time(minutes) 60 minutes   PT Cumulative Minutes 420   Therapy Time missed   Time missed?  No

## 2022-02-01 NOTE — ASSESSMENT & PLAN NOTE
Hx of renal cell carcinoma s/p nephrectomy in 2009  Recurrence in 2019  Follows with [facility] as an outpatient  Recently placed in hospice in 10/2021 but was then discharged in 12/2021 due to improvement  Plans to discuss radiation treatment in 2 weeks as outpatient with Dr Blake Taylor

## 2022-02-01 NOTE — PROGRESS NOTES
[facility]  Progress Note - Avi Peña  1965, 64 y o  male MRN: [id number]  Unit/Bed#: [facility] 222-82 Encounter: [id number]  Primary Care Provider: Dayna Cline DO   Date and time admitted to hospital: 1/26/2022 11:15 AM    Bradycardia  Assessment & Plan  New onset bradycardia on 1/28  EKG obtained - showed sinus bradycardia, 47BPM   Asymptomatic  Avoid beta-blockers  Continue to monitor with routine VS     Hyponatremia  Assessment & Plan  Na+ currently 132  Likely related to SIADH  Started on 1800 FR on 1/27  Serum osmo 288 on 1/28  Continue to trend with routine BMP  Sarcomatoid renal cell carcinoma (HCC)  Assessment & Plan  Hx of renal cell carcinoma s/p nephrectomy in 2009  Recurrence in 2019  Follows with [facility] as an outpatient  Recently placed in hospice in 10/2021 but was then discharged in 12/2021 due to improvement  Plans to discuss radiation treatment in 2 weeks as outpatient with Dr Scott Urbina  Essential hypertension  Assessment & Plan  Continue home amlodipine 10mg daily  Monitor BP with routine VS   Follow-up with PCP as outpatient  Mixed hyperlipidemia  Assessment & Plan  Currently not taking a statin  Last lipid panel on 2/10/21: Cholesterol 236, Triglycerides 113, HDL 40, and   Currently encouraging diet changes  Recommend repeat lipid panel as outpatient  Hyperglycemia  Assessment & Plan  Slightly elevated BG (110s) at [facility]  Likely steroid induced  Last A1c in 09/2020 was 6 0  A1c on 1/28 was 5 5  Continue to trend fasting BG with routine labs  * Metastatic renal cell carcinoma to brain [facility])  Assessment & Plan  Hx of renal cell carcinoma in 2009 with recurrence in 2019  Developed RUE weakness  CT head on 1/20 showed L parietal lobe mass 3 1x2 6cm and additional mass 3 2x3 2cm along with 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    1/20 - L frontal and occipital craniotomy performed by Dr Des Yee ([facility])  Started on Decadron taper in acute setting - transition to 1mg daily on  and then follow-up with Neurosurgery  Continue Keppra 1000mg Q12  Seizure precautions  Neurovascular checks Q shift  Ensure adequate pain control  Monitor incision for s/s of infection  PT/OT/Speech therapies  Primary team following  VTE Pharmacologic Prophylaxis:   Pharmacologic: Apixaban (Eliquis)  Mechanical VTE Prophylaxis in Place: Yes - sequential compression devices  Current Length of Stay: 6 day(s)    Current Patient Status: Inpatient Rehab     Discharge Plan: As per primary team     Code Status: Level 1 - Full Code    Subjective:   Pt examined while pt sitting in bed in pt room  Had his best night sleep last night  Therapy is going really well - has in-room privileges and has been walking without a cane/walker  Plans for discharge tomorrow  Currently has no concerns or questions regarding discharge  Still experiencing some "tightness" to the incision from the sutures  Objective:     Vitals:   Temp (24hrs), Av 7 °F (36 5 °C), Min:97 5 °F (36 4 °C), Max:97 8 °F (36 6 °C)    Temp:  [97 5 °F (36 4 °C)-97 8 °F (36 6 °C)] 97 5 °F (36 4 °C)  HR:  [56-68] 57  Resp:  [18] 18  BP: (104-132)/(53-87) 132/87  SpO2:  [96 %-99 %] 99 %  Body mass index is 22 72 kg/m²  Review of Systems   Constitutional: Negative for appetite change, chills, fatigue and fever  Respiratory: Negative for cough, shortness of breath, wheezing and stridor  Cardiovascular: Negative for chest pain, palpitations and leg swelling  Gastrointestinal: Negative for abdominal distention, abdominal pain, constipation, diarrhea, nausea and vomiting  LBM    Genitourinary: Negative for difficulty urinating  Musculoskeletal: Negative for arthralgias and back pain         "tightness" to the incision from the sutures, denies pain   Neurological: Negative for dizziness, weakness, light-headedness, numbness and headaches  Psychiatric/Behavioral: Negative for sleep disturbance  Input and Output Summary (last 24 hours): Intake/Output Summary (Last 24 hours) at 2/1/2022 7154  Last data filed at 1/31/2022 1300  Gross per 24 hour   Intake 240 ml   Output --   Net 240 ml       Physical Exam:     Physical Exam  Vitals and nursing note reviewed  Constitutional:       Appearance: Normal appearance  HENT:      Head: Normocephalic and atraumatic  Cardiovascular:      Rate and Rhythm: Normal rate and regular rhythm  Pulses: Normal pulses  Heart sounds: Normal heart sounds  No murmur heard  No friction rub  Pulmonary:      Effort: Pulmonary effort is normal  No respiratory distress  Breath sounds: Normal breath sounds  No wheezing or rhonchi  Abdominal:      General: Abdomen is flat  Bowel sounds are normal  There is no distension  Palpations: Abdomen is soft  Tenderness: There is no abdominal tenderness  Musculoskeletal:      Cervical back: Normal range of motion and neck supple  Right lower leg: No edema  Left lower leg: No edema  Skin:     General: Skin is warm and dry  Comments: L craniotomy incision, well-approximated  SAMMY  Sutures in place  No drainage, erythema, or edema present  Neurological:      Mental Status: He is alert and oriented to person, place, and time  Psychiatric:         Mood and Affect: Mood normal          Behavior: Behavior normal          Additional Data:     Labs:    Results from last 7 days   Lab Units 01/31/22  0557 01/27/22  0541 01/27/22  0541   WBC Thousand/uL 10 50   < > 8 80   HEMOGLOBIN g/dL 13 2*   < > 13 1*   HEMATOCRIT % 37 9*   < > 39 2*   PLATELETS Thousands/uL 179   < > 168   BANDS PCT % 1  --   --    NEUTROS PCT %  --   --  89*   LYMPHS PCT %  --   --  6*   LYMPHO PCT % 7*  --   --    MONOS PCT %  --   --  5   MONO PCT % 2  --   --    EOS PCT %  --   --  0    < > = values in this interval not displayed       Results from last 7 days   Lab Units 01/31/22  0557   SODIUM mmol/L 132*   POTASSIUM mmol/L 4 5   CHLORIDE mmol/L 101   CO2 mmol/L 31*   BUN mg/dL 22   CREATININE mg/dL 0 67*   ANION GAP mmol/L 0*   CALCIUM mg/dL 8 3*   GLUCOSE RANDOM mg/dL 122*         Results from last 7 days   Lab Units 02/01/22  0613 01/31/22  0635 01/30/22  0601 01/29/22  0627 01/28/22  1120 01/28/22  0559 01/27/22  0637   POC GLUCOSE mg/dl 123 119 122 105 144* 107 106     Results from last 7 days   Lab Units 01/28/22  0558   HEMOGLOBIN A1C % 5 5           Labs reviewed    Imaging:    Imaging reviewed    Recent Cultures (last 7 days):           Last 24 Hours Medication List:   Current Facility-Administered Medications   Medication Dose Route Frequency Provider Last Rate    amLODIPine  10 mg Oral Daily Jimena Slater MD      bisacodyl  10 mg Rectal Daily PRN MD Jose Sigala ON 2/5/2022] dexamethasone  1 mg Oral Daily Jimena Slater MD      dexamethasone  2 mg Oral Q12H [facility] MD [place] Sigala ON 2/2/2022] dexamethasone  2 mg Oral Daily Jimena Slater MD      enoxaparin  40 mg Subcutaneous Daily Jimena Slater MD      levETIRAcetam  1,000 mg Oral Q12H [facility] Jimena Slater MD      magnesium citrate  148 mL Oral Once PRN Jimena Slater MD      ondansetron  4 mg Oral Q6H PRN Jimena Slater MD      oxyCODONE  10 mg Oral Q4H PRN Jimena Slater MD      oxyCODONE  5 mg Oral Q4H PRN Jimena Slater MD      pantoprazole  40 mg Oral Early Morning Jimena Slater MD      polyethylene glycol  17 g Oral Daily PRN Jimena Slater MD      senna  2 tablet Oral Daily Jimena Slater MD      simethicone  80 mg Oral Q6H PRN Jimena Slater MD      sodium phosphate-biphosphate  1 enema Rectal Once PRN Jimena Slater MD          M*Modal software was used to dictate this note  It may contain errors with dictating incorrect words or incorrect spelling  Please contact the provider directly with any questions

## 2022-02-01 NOTE — ASSESSMENT & PLAN NOTE
Hx of renal cell carcinoma in 2009 with recurrence in 2019  Developed RUE weakness  CT head on 1/20 showed L parietal lobe mass 3 1x2 6cm and additional mass 3 2x3 2cm along with 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  1/20 - L frontal and occipital craniotomy performed by Dr Cl[facility]ton [facility])  Started on Decadron taper in acute setting - transition to 1mg daily on 2/5 and then follow-up with Neurosurgery  Continue Keppra 1000mg Q12  Seizure precautions  Neurovascular checks Q shift  Ensure adequate pain control  Monitor incision for s/s of infection  PT/OT/Speech therapies  Primary team following

## 2022-02-01 NOTE — CASE MANAGEMENT
Cm called and scheduled his outpt PT for 2/3 at 2:30 pm at [facility]  Placed on AVS and pt notified  Following to assist w/ d/c planning needs

## 2022-02-01 NOTE — PROGRESS NOTES
02/01/22 0830   Pain Assessment   Pain Score No Pain   Restrictions/Precautions   Precautions Fall Risk   Weight Bearing Restrictions No   ROM Restrictions No   Oral Hygiene   Type of Assistance Needed Independent   Physical Assistance Level No physical assistance   Comment IRP, completed prior to therapist arrival   Oral Hygiene CARE Score 6   Grooming   Able To Comb/Brush Hair;Wash/Dry Face;Brush/Clean Teeth;Wash/Dry Hands   Limitation Noted In Strength;Timeliness   Shower/Bathe Self   Type of Assistance Needed Supervision   Physical Assistance Level No physical assistance   Comment comleted shower this session in stance with shower chair behind for safety  Pt able to wash 10/10 parts  reports wife can provide S during shower   Shower/Bathe Self CARE Score 4   Bathing   Assessed Bath Style Shower   Anticipated D/C Bath Style Shower   Able to [place] Stefan Yes   Able to [employer] Temperature Yes   Able to Wash/Rinse/Dry (body part) Left Arm;Right Arm;L Upper Leg;R Upper Leg;L Lower Leg/Foot;R Lower Leg/Foot;Chest;Abdomen;Perineal Area; Buttocks   Limitations Noted in Balance   Positioning Seated;Standing   Findings  compelted shower, head dry pre/post session   Tub/Shower Transfer   Findings S for shower transfers, wife able to provide S   Upper Body Dressing   Type of Assistance Needed Independent   Physical Assistance Level No physical assistance   Comment seated   Upper Body Dressing CARE Score 6   Lower Body Dressing   Type of Assistance Needed Independent   Physical Assistance Level No physical assistance   Comment pt able to don undergarment/pants, [facility] for CM   Lower Body Dressing CARE Score 6   Putting On/Taking Off Footwear   Type of Assistance Needed Independent   Physical Assistance Level No physical assistance   Putting On/Taking Off Footwear CARE Score 6   Sit to Stand   Type of Assistance Needed Independent   Physical Assistance Level No physical assistance   Comment [facility] no AD    Sit to Stand CARE Score 6   Bed-Chair Transfer   Type of Assistance Needed Independent   Physical Assistance Level No physical assistance   Chair/Bed-to-Chair Transfer CARE Score 6   Toileting Hygiene   Type of Assistance Needed Independent   Physical Assistance Level No physical assistance   Toileting Hygiene CARE Score 6   Toilet Transfer   Type of Assistance Needed Independent   Physical Assistance Level No physical assistance   Toilet Transfer CARE Score 6   Therapeutic Excerise-Strength   UE Strength Yes   Right Upper Extremity- Strength   R Shoulder Flexion; Extension;Horizontal ABduction   R Elbow Elbow flexion;Elbow extension   R Position Seated   Equipment Theraband  (yellow)   R Weight/Reps/Sets 2x15   RUE Strength Comment provided pt with yellow tband and UE HEP program to inc overal FMC/GMC  tolerated well with rest break in between  Left Upper Extremity-Strength   LUE Strength Comment see above   Coordination   [facility] see belwof or details   Cognition   Overall Cognitive Status WFL   Arousal/Participation Alert; Cooperative   Attention Within functional limits   Orientation Level Oriented X4   Memory Decreased short term memory   Following Commands Follows one step commands with increased time or repetition   Activity Tolerance   Activity Tolerance Patient tolerated treatment well   Assessment   Treatment Assessment Engaged pt in 60mins of skilled OT services with focus on self-care, reassing RUE and HEP  Pt has met LTG at [facility] and S for bathing and transfer with family able to provide S< pt reports that wife may have a shower stool at home, handour proivded for hsower chair  Pt demosntrates improved [facility] and  strength, see below for details  Pt to cont with OP PT services only, pt rpeorting he would rather go just ot PT  Communicated with CONCHITA Saleh  Cont  [address] 2/2/22      Prognosis Good   Recommendation   OT Discharge Recommendation Home with outpatient rehabilitation  (PT only)   Equipment Recommended Shower/Tub chair with back ($)   OT Equipment ordered handout provided   OT - OK to Discharge Yes   Discharge Summary 2/2/22   OT Therapy Minutes   OT Time In 0830   OT Time Out 0930   OT Total Time (minutes) 60   OT Mode of treatment - Individual (minutes) 60   OT Mode of treatment - Concurrent (minutes) 0   OT Mode of treatment - Group (minutes) 0   OT Mode of treatment - Co-treat (minutes) 0   OT Mode of Treatment - Total time(minutes) 60 minutes   OT Cumulative Minutes 480   Therapy Time missed   Time missed?  No     9 HOLE PEG TEST (DC)  Right: 29 64sec (improved)  Left: 25sec     STRENGTH (DC)  Right: 50#, 50#, 45 avg 48 3# (Improved)  Left: 70#, 65#, 70# avg 68 3#      9 HOLE PEG TEST (IE)  Right: [id number] (NWFL)  Left: 23 40sec      STRENGTH (IE)  Right: 30#avg (NWFL)  Left: 65#, 60#, 55# avg 60#

## 2022-02-01 NOTE — CASE MANAGEMENT
Cm sent clinical update to Blas Braun at [facility] requesting coverage until d/c on 2/2 with outpt PT  Following to assist w/ d/c planning needs

## 2022-02-01 NOTE — ASSESSMENT & PLAN NOTE
Slightly elevated BG (110s) at [facility]  Likely steroid induced  Last A1c in 09/2020 was 6 0  A1c on 1/28 was 5 5  Continue to trend fasting BG with routine labs

## 2022-02-01 NOTE — TELEPHONE ENCOUNTER
Jonny Martines from [facility] calling in, requesting medical records from Dr Lucio Francisco  Patient last visit was in 2019  I advise to get records will need to go to medical records

## 2022-02-02 ENCOUNTER — TRANSITIONAL CARE MANAGEMENT (OUTPATIENT)
Dept: FAMILY MEDICINE CLINIC | Facility: CLINIC | Age: 57
End: 2022-02-02

## 2022-02-02 VITALS
TEMPERATURE: 97.8 F | WEIGHT: 167.55 LBS | DIASTOLIC BLOOD PRESSURE: 67 MMHG | SYSTOLIC BLOOD PRESSURE: 110 MMHG | HEART RATE: 56 BPM | RESPIRATION RATE: 18 BRPM | HEIGHT: 72 IN | BODY MASS INDEX: 22.69 KG/M2 | OXYGEN SATURATION: 97 %

## 2022-02-02 PROCEDURE — 99239 HOSP IP/OBS DSCHRG MGMT >30: CPT | Performed by: PHYSICAL MEDICINE & REHABILITATION

## 2022-02-02 PROCEDURE — 99231 SBSQ HOSP IP/OBS SF/LOW 25: CPT | Performed by: NURSE PRACTITIONER

## 2022-02-02 RX ADMIN — AMLODIPINE BESYLATE 10 MG: 10 TABLET ORAL at 09:44

## 2022-02-02 RX ADMIN — DEXAMETHASONE 2 MG: 4 TABLET ORAL at 09:45

## 2022-02-02 RX ADMIN — ENOXAPARIN SODIUM 40 MG: 40 INJECTION SUBCUTANEOUS at 09:45

## 2022-02-02 RX ADMIN — LEVETIRACETAM 1000 MG: 500 TABLET, FILM COATED ORAL at 09:44

## 2022-02-02 RX ADMIN — PANTOPRAZOLE SODIUM 40 MG: 40 TABLET, DELAYED RELEASE ORAL at 05:28

## 2022-02-02 NOTE — PLAN OF CARE
Problem: PAIN - ADULT  Goal: Verbalizes/displays adequate comfort level or baseline comfort level  Description: Interventions:  - Encourage patient to monitor pain and request assistance  - Assess pain using appropriate pain scale  - Administer analgesics based on type and severity of pain and evaluate response  - Implement non-pharmacological measures as appropriate and evaluate response  - Consider cultural and social influences on pain and pain management  - Notify physician/advanced practitioner if interventions unsuccessful or patient reports new pain  Outcome: Adequate for Discharge     Problem: INFECTION - ADULT  Goal: Absence or prevention of progression during hospitalization  Description: INTERVENTIONS:  - Assess and monitor for signs and symptoms of infection  - Monitor lab/diagnostic results  - Monitor all insertion sites, i e  indwelling lines, tubes, and drains  - Monitor endotracheal if appropriate and nasal secretions for changes in amount and color  - [place] appropriate cooling/warming therapies per order  - Administer medications as ordered  - Instruct and encourage patient and family to use good hand hygiene technique  - Identify and instruct in appropriate isolation precautions for identified infection/condition  Outcome: Adequate for Discharge  Goal: Absence of fever/infection during neutropenic period  Description: INTERVENTIONS:  - Monitor WBC    Outcome: Adequate for Discharge     Problem: SAFETY ADULT  Goal: Patient will remain free of falls  Description: INTERVENTIONS:  - Educate patient/family on patient safety including physical limitations  - Instruct patient to call for assistance with activity   - Consult OT/PT to assist with strengthening/mobility   - Keep Call bell within reach  - Keep bed low and locked with side rails adjusted as appropriate  - Keep care items and personal belongings within reach  - Initiate and maintain comfort rounds  - Make Fall Risk Sign visible to staff  - Obtain necessary fall risk management equipment:   - Apply yellow socks and bracelet for high fall risk patients  - Consider moving patient to room near nurses station  Outcome: Adequate for Discharge  Goal: Maintain or return to baseline ADL function  Description: INTERVENTIONS:  -  Assess patient's ability to carry out ADLs; assess patient's baseline for ADL function and identify physical deficits which impact ability to perform ADLs (bathing, care of mouth/teeth, toileting, grooming, dressing, etc )  - Assess/evaluate cause of self-care deficits   - Assess range of motion  - Assess patient's mobility; develop plan if impaired  - Assess patient's need for assistive devices and provide as appropriate  - Encourage maximum independence but intervene and supervise when necessary  - Involve family in performance of ADLs  - Assess for home care needs following discharge   - Consider OT consult to assist with ADL evaluation and planning for discharge  - Provide patient education as appropriate  Outcome: Adequate for Discharge  Goal: Maintains/Returns to pre admission functional level  Description: INTERVENTIONS:  - Perform BMAT or MOVE assessment daily    - Set and communicate daily mobility goal to care team and patient/family/caregiver     - Collaborate with rehabilitation services on mobility goals if consulted    - Ambulate patient times a day-self  - Out of bed to chair times a day   - Out of bed for meals  times a day  - Out of bed for toileting  - Record patient progress and toleration of activity level   Outcome: Adequate for Discharge     Problem: DISCHARGE PLANNING  Goal: Discharge to home or other facility with appropriate resources  Description: INTERVENTIONS:  - Identify barriers to discharge w/patient and caregiver  - Arrange for needed discharge resources and transportation as appropriate  - Identify discharge learning needs (meds, wound care, etc )  - Arrange for interpretive services to assist at discharge as needed  - Refer to Case Management Department for coordinating discharge planning if the patient needs post-hospital services based on physician/advanced practitioner order or complex needs related to functional status, cognitive ability, or social support system  Outcome: Adequate for Discharge     Problem: Knowledge Deficit  Goal: Patient/family/caregiver demonstrates understanding of disease process, treatment plan, medications, and discharge instructions  Description: Complete learning assessment and assess knowledge base    Interventions:  - Provide teaching at level of understanding  - Provide teaching via preferred learning methods  Outcome: Adequate for Discharge     Problem: Potential for Falls  Goal: Patient will remain free of falls  Description: INTERVENTIONS:  - Educate patient/family on patient safety including physical limitations  - Instruct patient to call for assistance with activity   - Consult OT/PT to assist with strengthening/mobility   - Keep Call bell within reach  - Keep bed low and locked with side rails adjusted as appropriate  - Keep care items and personal belongings within reach  - Initiate and maintain comfort rounds  - Make Fall Risk Sign visible to staff  - Apply yellow socks and bracelet for high fall risk patients  - Consider moving patient to room near nurses station  Outcome: Adequate for Discharge     Problem: NEUROSENSORY - ADULT  Goal: Achieves stable or improved neurological status  Description: INTERVENTIONS  - Monitor and report changes in neurological status  - Monitor vital signs such as temperature, blood pressure, glucose, and any other labs ordered   - Initiate measures to prevent increased intracranial pressure  - Monitor for seizure activity and implement precautions if appropriate      Outcome: Adequate for Discharge  Goal: Remains free of injury related to seizures activity  Description: INTERVENTIONS  - Maintain airway, patient safety  and administer oxygen as ordered  - Monitor patient for seizure activity, document and report duration and description of seizure to physician/advanced practitioner  - If seizure occurs,  ensure patient safety during seizure  - Reorient patient post seizure  - Seizure pads on all 4 side rails  - Instruct patient/family to notify RN of any seizure activity including if an aura is experienced  - Instruct patient/family to call for assistance with activity based on nursing assessment  - Administer anti-seizure medications if ordered    Outcome: Adequate for Discharge  Goal: Achieves maximal functionality and self care  Description: INTERVENTIONS  - Monitor swallowing and airway patency with patient fatigue and changes in neurological status  - Encourage and assist patient to increase activity and self care     - Encourage visually impaired, hearing impaired and aphasic patients to use assistive/communication devices  Outcome: Adequate for Discharge     Problem: CARDIOVASCULAR - ADULT  Goal: Maintains optimal cardiac output and hemodynamic stability  Description: INTERVENTIONS:  - Monitor I/O, vital signs and rhythm  - Monitor for S/S and trends of decreased cardiac output  - Administer and titrate ordered vasoactive medications to optimize hemodynamic stability  - Assess quality of pulses, skin color and temperature  - Assess for signs of decreased coronary artery perfusion  - Instruct patient to report change in severity of symptoms  Outcome: Adequate for Discharge     Problem: RESPIRATORY - ADULT  Goal: Achieves optimal ventilation and oxygenation  Description: INTERVENTIONS:  - Assess for changes in respiratory status  - Assess for changes in mentation and behavior  - Position to facilitate oxygenation and minimize respiratory effort  - Oxygen administered by appropriate delivery if ordered  - Initiate smoking cessation education as indicated  - Encourage broncho-pulmonary hygiene including cough, deep breathe, Incentive Spirometry  - Assess the need for suctioning and aspirate as needed  - Assess and instruct to report SOB or any respiratory difficulty  - Respiratory Therapy support as indicated  Outcome: Adequate for Discharge     Problem: GASTROINTESTINAL - ADULT  Goal: Minimal or absence of nausea and/or vomiting  Description: INTERVENTIONS:  - Administer IV fluids if ordered to ensure adequate hydration  - Maintain NPO status until nausea and vomiting are resolved  - Nasogastric tube if ordered  - Administer ordered antiemetic medications as needed  - Provide nonpharmacologic comfort measures as appropriate  - Advance diet as tolerated, if ordered  - Consider nutrition services referral to assist patient with adequate nutrition and appropriate food choices  Outcome: Adequate for Discharge  Goal: Maintains or returns to baseline bowel function  Description: INTERVENTIONS:  - Assess bowel function  - Encourage oral fluids to ensure adequate hydration  - Administer IV fluids if ordered to ensure adequate hydration  - Administer ordered medications as needed  - Encourage mobilization and activity  - Consider nutritional services referral to assist patient with adequate nutrition and appropriate food choices  Outcome: Adequate for Discharge  Goal: Maintains adequate nutritional intake  Description: INTERVENTIONS:  - Monitor percentage of each meal consumed  - Identify factors contributing to decreased intake, treat as appropriate  - Assist with meals as needed  - Monitor I&O, weight, and lab values if indicated  - Obtain nutrition services referral as needed  Outcome: Adequate for Discharge  Goal: Oral mucous membranes remain intact  Description: INTERVENTIONS  - Assess oral mucosa and hygiene practices  - Implement preventative oral hygiene regimen  - Implement oral medicated treatments as ordered  - Initiate Nutrition services referral as needed  Outcome: Adequate for Discharge     Problem: GENITOURINARY - ADULT  Goal: Maintains or returns to baseline urinary function  Description: INTERVENTIONS:  - Assess urinary function  - Encourage oral fluids to ensure adequate hydration if ordered  - Administer IV fluids as ordered to ensure adequate hydration  - Administer ordered medications as needed  - Offer frequent toileting  - Follow urinary retention protocol if ordered  Outcome: Adequate for Discharge  Goal: Absence of urinary retention  Description: INTERVENTIONS:  - Assess patients ability to void and empty bladder  - Monitor I/O  - Bladder scan as needed  - Discuss with physician/AP medications to alleviate retention as needed  - Discuss catheterization for long term situations as appropriate  Outcome: Adequate for Discharge     Problem: METABOLIC, FLUID AND ELECTROLYTES - ADULT  Goal: Electrolytes maintained within normal limits  Description: INTERVENTIONS:  - Monitor labs and assess patient for signs and symptoms of electrolyte imbalances  - Administer electrolyte replacement as ordered  - Monitor response to electrolyte replacements, including repeat lab results as appropriate  - Instruct patient on fluid and nutrition as appropriate  Outcome: Adequate for Discharge  Goal: Fluid balance maintained  Description: INTERVENTIONS:  - Monitor labs   - Monitor I/O and WT  - Instruct patient on fluid and nutrition as appropriate  - Assess for signs & symptoms of volume excess or deficit  Outcome: Adequate for Discharge     Problem: SKIN/TISSUE INTEGRITY - ADULT  Goal: Skin Integrity remains intact(Skin Breakdown Prevention)  Description: Assess:  -Perform Alex assessment every shift  -Clean and moisturize skin every day  -Inspect skin when repositioning, toileting, and assisting with ADLS  -Assess extremities for adequate circulation and sensation     Bed Management:  -Have minimal linens on bed & keep smooth, unwrinkled  -Change linens as needed when moist or perspiring        Toileting:  -Offer bedside commode     Activity:  -Mobilize patient  times a day-self  -Encourage activity and walks on unit  -Encourage or provide ROM exercises     -Use appropriate equipment to lift or move patient in bed      Skin Care:  -Avoid use of baby powder, tape, friction and shearing, hot water or constrictive clothing  -Do not massage red bony areas    Next Steps:  -Teach patient strategies to minimize risks such as  -Consider consults to  interdisciplinary teams such as   Outcome: Adequate for Discharge  Goal: Incision(s), wounds(s) or drain site(s) healing without S/S of infection  Description: INTERVENTIONS  - Assess and document dressing, incision, wound bed, drain sites and surrounding tissue  - Provide patient and family education  - Perform skin care/dressing changes every   Outcome: Adequate for Discharge     Problem: HEMATOLOGIC - ADULT  Goal: Maintains hematologic stability  Description: INTERVENTIONS  - Assess for signs and symptoms of bleeding or hemorrhage  - Monitor labs  - Administer supportive blood products/factors as ordered and appropriate  Outcome: Adequate for Discharge     Problem: MUSCULOSKELETAL - ADULT  Goal: Maintain or return mobility to safest level of function  Description: INTERVENTIONS:  - Assess patient's ability to carry out ADLs; assess patient's baseline for ADL function and identify physical deficits which impact ability to perform ADLs (bathing, care of mouth/teeth, toileting, grooming, dressing, etc )  - Assess/evaluate cause of self-care deficits   - Assess range of motion  - Assess patient's mobility  - Assess patient's need for assistive devices and provide as appropriate  - Encourage maximum independence but intervene and supervise when necessary  - Involve family in performance of ADLs  - Assess for home care needs following discharge   - Consider OT consult to assist with ADL evaluation and planning for discharge  - Provide patient education as appropriate  Outcome: Adequate for Discharge     Problem: Nutrition/Hydration-ADULT  Goal: Nutrient/Hydration intake appropriate for improving, restoring or maintaining nutritional needs  Description: Monitor and assess patient's nutrition/hydration status for malnutrition  Collaborate with interdisciplinary team and initiate plan and interventions as ordered  Monitor patient's weight and dietary intake as ordered or per policy  Utilize nutrition screening tool and intervene as necessary  Determine patient's food preferences and provide high-protein, high-caloric foods as appropriate       INTERVENTIONS:  - Monitor oral intake, urinary output, labs, and treatment plans  - Assess nutrition and hydration status and recommend course of action  - Evaluate amount of meals eaten  - Assist patient with eating if necessary   - Allow adequate time for meals  - Recommend/ encourage appropriate diets, oral nutritional supplements, and vitamin/mineral supplements  - Order, calculate, and assess calorie counts as needed  - Recommend, monitor, and adjust tube feedings and TPN/PPN based on assessed needs  - Assess need for intravenous fluids  - Provide specific nutrition/hydration education as appropriate  - Include patient/family/caregiver in decisions related to nutrition  Outcome: Adequate for Discharge     Problem: Prexisting or High Potential for Compromised Skin Integrity  Goal: Skin integrity is maintained or improved  Description: INTERVENTIONS:  - Identify patients at risk for skin breakdown  - Assess and monitor skin integrity  - Assess and monitor nutrition and hydration status  - Monitor labs   - Assess for incontinence   - Turn and reposition patient  - Assist with mobility/ambulation  - Relieve pressure over bony prominences  - Avoid friction and shearing  - Provide appropriate hygiene as needed including keeping skin clean and dry  - Evaluate need for skin moisturizer/barrier cream  - Collaborate with interdisciplinary team   - Patient/family teaching  - Consider wound care consult   Outcome: Adequate for Discharge

## 2022-02-02 NOTE — DISCHARGE INSTRUCTIONS
DISCHARGE INSTRUCTIONS: Albin Macario [age]    Bring these instructions with you to your Outpatient Physician appointments so they can order and follow-up any additional lab work or imaging recommended at time of discharge  It  is you or your caregivers responsibility to obtain follow-up MEDICATION REFILLS  As indicated through your Primary Care Physician (PCP) and other outpatient specialty provider(s) after discharge  Please follow-up with your PCP as soon as possible after discharge to set-up follow-up management and when appropriate refills  You remain a fall and injury risk which could be severe  - Your risk of fall has decreased however since admission to acute rehab  Caregiver training has been completed with our staff  - Appropriate supervision +/- assistance as instructed during your rehab course is recommended to decrease risk of fall and injury  If you (or your health care proxy) have any questions or concerns regarding your acute rehabilitation stay including issues with medications, rehabilitation, and follow-up plan, please call:     [facility] Acute Rehabilitation Unit at [facility] at [phone]      Should you develop fevers, chills, new weakness, changes in sensation, difficulty speaking, facial weakness, confusion, shortness of breath, chest pain, or other concerning symptoms please call 911 and/or obtain transportation to nearest ER immediately  Should you develop worsening pain, swelling, or drainage notify your surgeon right away or obtain transportation to nearest ER for evaluation  PHYSICIANS to see:  Please see your doctors listed in the follow up providers section of your discharge paperwork, and take the discharge paperwork with you to your appointments  LAB WORK to recommended after discharge:  Obtain the following lab orders and follow-up management through primary care physician and outpatient specialists     1  Please check a  BMP to monitor electrolytes and kidney function at next visit/within 1 week    Excessive delay in labs and appropriate follow-up could potentially increase your risk of complications which could be severe and even life-threatening  It is you or your caregiver's responsibility to ensure these tests are ordered by your outpatient providers and followed up with accordingly  [facility] to locate the closest open outpatient lab center where you can have your lab work drawn  Contact them at [phone]    IMAGING to follow-up:  Follow-up imaging as discussed with your recent physicians or at discretion of your outpatient physicians to be determined at time of your appointments  SKIN CARE INSTRUCTIONS to follow:  Monitor incision(s) for increased redness, swelling, pain/tenderness, discharge/pus and promptly notify your surgeon should these develop  - Should you develop significant pain, swelling, or drainage obtain transportation to nearest emergency room for immediate evaluation if unable to reach your surgeon promptly   - Should you develop uncontrolled pain, fever, chills, sweats, changes in strength, sensation, or color of this area obtain transportation to nearest emergency room for immediate evaluation  WEIGHTBEARING/ACTIVITY PRECAUTIONS to follow:  Weightbearing as tolerated  Driving restrictions: You are recommended against driving until cleared by an outpatient physician  Work restrictions: You should NOT return to work (if working) until cleared by an outpatient physician  You should not operate heavy machinery (if applicable) until cleared by an outpatient physician  Alcohol restrictions: You are recommended to not drink alcohol at this time unless cleared by an outpatient physician  Drinking alcohol in your current functional condition can increase your risk of injury which could be severe    Drinking alcohol given your current health problems can lead to increased medical complications which could be severe  Combining alcohol with your current medications can increase your risk of injury which could be severe  Smoking restrictions: You are recommended to not smoke nicotine  Smoking increases your risk of heart attack, stroke, emphysema/COPD, and lung cancer  MEDICATIONS:  Please see a full list of your medications outlined in the After Visit Summary that is attached to these Discharge Instructions  Please note changes may have been made to your medications please refer to your discharge paperwork for your current medications and take this list with you to all your doctors appointments for your doctors to review  Please do not resume a home medication unless the medication reconciliation sheet indicates to do so, please do not assume that a medication that you were given a prescription for is the same as a medication you have at home based on both medications having the same name as dosages and frequency may have changed  Unless specifically noted in your medication list provided to you in your discharge paper work do not resume prior vitamins, minerals, or supplements you may have been taking prior to your hospitalization unless instructed by an outpatient physician in the future  MEDICAL MANAGEMENT AT HOME specific to you:    Hypertension Management:  Only take the medications prescribed for you at time of discharge - overly high or low blood pressure increases your risk for health complications    Follow-up with PCP/family doctor regularly to ensure blood pressure remains adequately controlled  Please check your blood pressure prior to taking your blood pressure medications and keep a log that you will bring with you to your follow-up doctors' appointments      >Please contact your family doctor or cardiologist immediately for a blood pressure below 100/50 and do not take your blood pressure medications until speaking with them  >Please contact your family doctor or cardiologist as soon as possible for blood pressure greater than 160/100  Hyponatremia management (Low blood sodium level): You are recommended to maintain a 1800 ml fluid restriction per day  1 cup (8 oz) is roughly equal to 235 ml    5 (8 oz) cups is equal to close to 1200 ml of fluid  Your outside physicians will need to check you blood sodium levels and adjust management as needed after you discharge  Please follow-up with them promptly to ensure optimal management  If you develop increased swelling, lightheadedness, dizziness contact your physicians as soon as possible  If you develop confusion, weakness, difficulty staying awake, or other significant symptoms obtain transportation to nearest emergency room  Please note a summary of your hospital stay with relevant information for your doctors will try to be sent to them  Please confirm with your doctors at your follow up visits that they have received this summary and have them contact [address] [place] [address] if they have not received them along with any other medical records they may require       [facility] Phone Number:  [phone]

## 2022-02-02 NOTE — NURSING NOTE
RN reviewed all discharge medications, appointments, any restrictions with Shira Adairjanuary and the wife  All belongings are accounted for and went with the pt  Shira Adairjanuary walked off the unit with this RN, wife pulled up to the front door and he independently walked to the car with the RN   Pt asked to please call our [facility] unit should any questions arise

## 2022-02-02 NOTE — CASE MANAGEMENT
Team d/c summary:     Pt made good rehab progress and returned home with family  He will receive continued outpt PT  No DME needed at d/c  His wife was present for d/c instructions and is aware of functional ability

## 2022-02-02 NOTE — ASSESSMENT & PLAN NOTE
Slightly elevated BG (110s) at [facility]  Likely steroid induced  Last A1c in 09/2020 was 6 0  A1c on 1/28 was 5 5  Continue to trend fasting BG with routine labs

## 2022-02-02 NOTE — ASSESSMENT & PLAN NOTE
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

## 2022-02-02 NOTE — PROGRESS NOTES
[facility]  Progress Note - Abad Cary  1965, 64 y o  male MRN: [id number]  Unit/Bed#: -18 Encounter: [id number]  Primary Care Provider: Viral Kam DO   Date and time admitted to hospital: 1/26/2022 11:15 AM    Bradycardia  Assessment & Plan  New onset bradycardia on 1/28  EKG obtained - showed sinus bradycardia, 47BPM   Asymptomatic  Avoid beta-blockers  Continue to monitor with routine VS     Hyponatremia  Assessment & Plan  Na+ currently 132  Likely related to SIADH  Started on 1800 FR on 1/27  Serum osmo 288 on 1/28  Continue to trend with routine BMP  Sarcomatoid renal cell carcinoma (HCC)  Assessment & Plan  Hx of renal cell carcinoma s/p nephrectomy in 2009  Recurrence in 2019  Follows with [facility] as an outpatient  Recently placed in hospice in 10/2021 but was then discharged in 12/2021 due to improvement  Plans to discuss radiation treatment in 2 weeks as outpatient with Dr Iam Jalloh  Essential hypertension  Assessment & Plan  Continue home amlodipine 10mg daily  Monitor BP with routine VS   Follow-up with PCP as outpatient  Mixed hyperlipidemia  Assessment & Plan  Currently not taking a statin  Last lipid panel on 2/10/21: Cholesterol 236, Triglycerides 113, HDL 40, and   Currently encouraging diet changes  Recommend repeat lipid panel as outpatient  Hyperglycemia  Assessment & Plan  Slightly elevated BG (110s) at [facility]  Likely steroid induced  Last A1c in 09/2020 was 6 0  A1c on 1/28 was 5 5  Continue to trend fasting BG with routine labs  * Metastatic renal cell carcinoma to brain [facility])  Assessment & Plan  Hx of renal cell carcinoma in 2009 with recurrence in 2019  Developed RUE weakness  CT head on 1/20 showed L parietal lobe mass 3 1x2 6cm and additional mass 3 2x3 2cm along with 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    1/20 - L frontal and occipital craniotomy performed by Dr Ewa Booker ([facility])  Started on Decadron taper in acute setting - transition to 1mg daily on  and then follow-up with Neurosurgery  Continue Keppra 1000mg Q12  Seizure precautions  Neurovascular checks Q shift  Ensure adequate pain control  Monitor incision for s/s of infection  PT/OT/Speech therapies  Primary team following  VTE Pharmacologic Prophylaxis:   Pharmacologic: Enoxaparin (Lovenox)  Mechanical VTE Prophylaxis in Place: Yes - sequential compression devices  Current Length of Stay: 7 day(s)    Current Patient Status: Inpatient Rehab     Discharge Plan: As per primary team     Code Status: Level 1 - Full Code    Subjective:   Pt examined while pt sitting in bed in pt room  Currently has no complaints  Reviewed fluid restriction and sodium level with pt  Plans for discharge today  Has no concerns or questions regarding discharge  Following up with Neurosurgery later this week  Objective:     Vitals:   Temp (24hrs), Av 8 °F (36 6 °C), Min:97 6 °F (36 4 °C), Max:98 °F (36 7 °C)    Temp:  [97 6 °F (36 4 °C)-98 °F (36 7 °C)] 97 8 °F (36 6 °C)  HR:  [56-66] 56  Resp:  [18] 18  BP: (119-153)/(57-74) 153/74  SpO2:  [97 %-98 %] 97 %  Body mass index is 22 72 kg/m²  Review of Systems   Constitutional: Negative for appetite change, chills, fatigue and fever  Respiratory: Negative for cough and shortness of breath  Cardiovascular: Negative for chest pain, palpitations and leg swelling  Gastrointestinal: Negative for abdominal distention, abdominal pain, constipation, diarrhea, nausea and vomiting  LBM    Genitourinary: Negative for difficulty urinating  Musculoskeletal: Negative for arthralgias and back pain  Neurological: Negative for dizziness, weakness, light-headedness, numbness and headaches  Psychiatric/Behavioral: Negative for sleep disturbance  Input and Output Summary (last 24 hours):        Intake/Output Summary (Last 24 hours) at 2022 9593  Last data filed at 2/2/2022 0801  Gross per 24 hour   Intake 700 ml   Output --   Net 700 ml       Physical Exam:     Physical Exam  Vitals and nursing note reviewed  Constitutional:       Appearance: Normal appearance  HENT:      Head: Normocephalic and atraumatic  Cardiovascular:      Rate and Rhythm: Normal rate and regular rhythm  Pulses: Normal pulses  Heart sounds: Normal heart sounds  No murmur heard  No friction rub  Pulmonary:      Effort: Pulmonary effort is normal  No respiratory distress  Breath sounds: Normal breath sounds  No wheezing or rhonchi  Abdominal:      General: Abdomen is flat  Bowel sounds are normal  There is no distension  Palpations: Abdomen is soft  Tenderness: There is no abdominal tenderness  Musculoskeletal:      Cervical back: Normal range of motion and neck supple  Right lower leg: No edema  Left lower leg: No edema  Skin:     General: Skin is warm and dry  Capillary Refill: Capillary refill takes less than 2 seconds  Comments: L craniotomy incision, well-approximated  SAMMY  Sutures in place  No drainage, erythema, or edema present  Neurological:      Mental Status: He is alert and oriented to person, place, and time  Psychiatric:         Mood and Affect: Mood normal          Behavior: Behavior normal          Additional Data:     Labs:    Results from last 7 days   Lab Units 01/31/22  0557 01/27/22  0541 01/27/22  0541   WBC Thousand/uL 10 50   < > 8 80   HEMOGLOBIN g/dL 13 2*   < > 13 1*   HEMATOCRIT % 37 9*   < > 39 2*   PLATELETS Thousands/uL 179   < > 168   BANDS PCT % 1  --   --    NEUTROS PCT %  --   --  89*   LYMPHS PCT %  --   --  6*   LYMPHO PCT % 7*  --   --    MONOS PCT %  --   --  5   MONO PCT % 2  --   --    EOS PCT %  --   --  0    < > = values in this interval not displayed       Results from last 7 days   Lab Units 01/31/22  0557   SODIUM mmol/L 132*   POTASSIUM mmol/L 4 5   CHLORIDE mmol/L 101   CO2 mmol/L 31*   BUN mg/dL 22   CREATININE mg/dL 0 67*   ANION GAP mmol/L 0*   CALCIUM mg/dL 8 3*   GLUCOSE RANDOM mg/dL 122*         Results from last 7 days   Lab Units 02/01/22  0613 01/31/22  0635 01/30/22  0601 01/29/22  0627 01/28/22  1120 01/28/22  0559 01/27/22  0637   POC GLUCOSE mg/dl 123 119 122 105 144* 107 106     Results from last 7 days   Lab Units 01/28/22  0558   HEMOGLOBIN A1C % 5 5           Labs reviewed    Imaging:    Imaging reviewed    Recent Cultures (last 7 days):           Last 24 Hours Medication List:   Current Facility-Administered Medications   Medication Dose Route Frequency Provider Last Rate    amLODIPine  10 mg Oral Daily Zula Erps, MD      bisacodyl  10 mg Rectal Daily PRN Zula Erps, MD Anupama [facility] ON 2/5/2022] dexamethasone  1 mg Oral Daily Zula Erps, MD      dexamethasone  2 mg Oral Daily Zula Erps, MD      enoxaparin  40 mg Subcutaneous Daily Zula Erps, MD      levETIRAcetam  1,000 mg Oral Q12H [facility] Zula Erps, MD      magnesium citrate  148 mL Oral Once PRN Zula Erps, MD      ondansetron  4 mg Oral Q6H PRN Zula Erps, MD      oxyCODONE  10 mg Oral Q4H PRN Zula Erps, MD      oxyCODONE  5 mg Oral Q4H PRN Zula Erps, MD      pantoprazole  40 mg Oral Early Morning Zula Erps, MD      polyethylene glycol  17 g Oral Daily PRN Zula Erps, MD      senna  2 tablet Oral Daily Zula Erps, MD      simethicone  80 mg Oral Q6H PRN Zula Erps, MD      sodium phosphate-biphosphate  1 enema Rectal Once PRN Zula Erps, MD          M*Teachbase software was used to dictate this note  It may contain errors with dictating incorrect words or incorrect spelling  Please contact the provider directly with any questions

## 2022-02-02 NOTE — ASSESSMENT & PLAN NOTE
Hx of renal cell carcinoma s/p nephrectomy in 2009  Recurrence in 2019  Follows with [facility] as an outpatient  Recently placed in hospice in 10/2021 but was then discharged in 12/2021 due to improvement  Plans to discuss radiation treatment in 2 weeks as outpatient with Dr Anish Robles

## 2022-02-03 ENCOUNTER — EVALUATION (OUTPATIENT)
Dept: PHYSICAL THERAPY | Facility: CLINIC | Age: 57
End: 2022-02-03
Payer: COMMERCIAL

## 2022-02-03 DIAGNOSIS — C79.31 METASTATIC RENAL CELL CARCINOMA TO BRAIN (HCC): Primary | ICD-10-CM

## 2022-02-03 DIAGNOSIS — R26.89 BALANCE DISORDER: ICD-10-CM

## 2022-02-03 DIAGNOSIS — C64.9 METASTATIC RENAL CELL CARCINOMA TO BRAIN (HCC): Primary | ICD-10-CM

## 2022-02-03 PROCEDURE — 97162 PT EVAL MOD COMPLEX 30 MIN: CPT | Performed by: PHYSICAL THERAPIST

## 2022-02-03 NOTE — PROGRESS NOTES
PT Evaluation     Today's date: 2/3/2022  Patient name: Александр Can  : 1965  MRN: [id number]  Referring provider: Stefano Green MD  Dx:   Encounter Diagnosis     ICD-10-CM    1  Metastatic renal cell carcinoma to brain (HCC)  C79 31     C64 9    2  Balance disorder  R26 89                   Assessment  Assessment details: Patient is a 64year old male presenting to PT with diagnosis of metastatic renal cell carcinoma to brain [facility]) and balance/coordination disorder  He presents with decreased functional strength/endurance due to recent hospitalization, impaired gait/balance, decreased strength and decreased tolerance to activity  Patient would benefit from skilled PT services to address these issues and to maximize function  Thank you for the referral      Understanding of Dx/Px/POC: good   Prognosis: good    Goals  STG  1  Balance & endurance & gait & locomotion are improved by 50% in 4 weeks  2  Able to ascend/descend full flight of stairs without fatigue  LTG  1  Ambulation/stair climbing are improved to maximal level of function  2  Patient is independent with HEP      Plan  Plan details:  Will attend OP PT 1x/week due to co-pay and willingness to perform home exercise program    Patient would benefit from: skilled physical therapy  Other planned modality interventions: prn  Planned therapy interventions: neuromuscular re-education, therapeutic activities, therapeutic exercise and home exercise program  Frequency: 1x week  Duration in weeks: 8  Treatment plan discussed with: patient        Subjective Evaluation    History of Present Illness  Mechanism of injury: surgery  Mechanism of injury: Per MD note from [facility] on 22:    DATE: 2022    DIAGNOSIS metastatic renal cell cancer to the brain    STAGE: 4    HISTORY OF PRESENT ILLNESS:     Lucius Cruz is a 64y o - year old male who has a history of metastatic renal cell carcinoma with nephrectomy in  and recurrence in 2019 that presented with back pain and unintentional weight loss, saw oncologist Dr Vanessa Wright and had a CT scan that showed a 6 3 cm retroperitoneal mass  Biopsied on 4/17/19, confirming recurrence       Of note, he also may have had empiric radiation to a cerebellar lesion back in 2009 closer to his diagnosis  Per records, there was a "brainstem lesion" that was unable to be biopsied but was radiated  Junella More in [facility]  F/u Yearly until about 2012  He has since had a number of different therapies including metastectomy for a retroperitoneal recurrence and several different forms of systemic therapy (ipi/nivo, maintenance nivo, cabozantinib, pembrolizumab and levatinib)  He was transitioned to hospice in October 2021 but started to feel significantly better and was transitioned out of hospice in December 2021  He has previously received 40 Gy in 10 fractions to a para-aortic lymph node in 4/2021 with Dr Jose Armando Juarez  He recently presented with right sided weakness  His symptoms started about two weeks ago but progressively worsened with decreased ability to move the upper and lower right extremities  CT of the brain revealed two large brain lesion  He underwent left frontal and left parietal craniotomies and post-operatively, he was able to move his right extremity more than on admission  He feels tired but feels the surgery has gone well  He has minimal issues at this time  He was discharged with dexamethasone and keppra taper  Currently on 2mg Qs and PPI  His right sided weakness has improved  He is in rehab but due to be discharged tomorrow  Has chronic left eye dryness and needs new glasses  Per PT IE on 2/3/22:  Patient is a 64year old male presenting to PT with diagnosis of metastatic renal cell carcinoma to brain [facility]) and balance/coordination disorder   Pt was recently hospitalized at [facility] [place] and had frontal and occipital craniotomies for tumor resections on 1/20/22 due to onset of right extremity weakness  Pt states just over 2 weeks ago right upper extremity became very weak and he had brain MRI which discovered met brain tumors  Pt was just released from PT acute rehab at [facility] on 22  States mobility gradually improving in upper extremity since surgery but feels only 60%  Feels focus/concentration is also improving  Reports continued weakness in legs from being in hospital  Reports difficulty with stairs  Denies pain  Denies headaches  Getting stitches out tomorrow  Patient enjoys playing [employer]  Wants to work on strength/endurance/coordination  Patient has treadmill/free weights at home  Pain  At best pain ratin  At worst pain ratin  Progression: improved    Social Support  Lives with: spouse    Patient Goals  Patient goals for therapy: improved balance, increased motion, increased strength, independence with ADLs/IADLs and return to sport/leisure activities          Objective     Functional Assessment        Comments  Lumbar AROM: wfl, no pain with motion testing  Gait: independent for short distances, no assistive device  Squats: bilateral hip weakness, able to obtain 75% motion  LE MMT: 4+/5 throughout, no pain   UE MMT: 4+/5 throughout, no pain  Sensation intact to light touch  Hip PROM: wnl, limited hamstring length bilaterally  Staggered stance on firm: 30 seconds, no sway  Staggered stance on foam: 30 seconds, mild sway  SL stance: 3 seconds before LOB on left, 4 seconds before LOB on right   Reviewed acute rehab exercises, focused on walking/building functional endurance in LE and t-band/postural and bicep/tricep strengthening for upper body           Precautions: ACTIVE stage 4 metastatic renal cell carcinoma to brain  *POC to focus on functional strengthening/endurance/balance      Manuals 2/3            prn                                                    Neuro Re-Ed             t-band shorty NV            t-band rows NV            SACHIN activities (lat pull downs with bar, chest press with bar, etc) NV                                                                Ther Ex             Bike warm up NV            UBE If indicated            VG DL squats NV                                                                             Ther Activity             Functional marches-->gait NV            Functional heel raises NV            Side stepping at bar NV            BIODEX balance activity (WS, LOS, etc) NV                         Gait Training                                       Modalities             prn

## 2022-02-03 NOTE — OCCUPATIONAL THERAPY NOTE
OT DISCHARGE SUMMARY    Pt made good progress during stay on the [facility] following admission for metastatic renal cell carcinoma to brain  Pt presented upon IE with generalized weakness, decreased endurance, activity tolerance, and functional mobility  On evaluation, pt required Ramiro to complete all ADLs and functional transfers  Pt was discharged to home with wife and family support  Pt currently functioning at Marjorie level for ADL, S for shower transfer and bathing which wife reports she can provide, Marjorie level for transfers without AD  The following DME was recommended shower chair which handout was provided to pt  Family training not required at this time  Pt ahs made G progress and met LTG, improved RUE FMC/GMC, provided with HEP to cont to progress  Pt to have OP PT only       9 HOLE PEG TEST (DC)  Right: 29 64sec (improved)  Left: 25sec      STRENGTH (DC)  Right: 50#, 50#, 45 avg 48 3# (Improved)  Left: 70#, 65#, 70# avg 68 3#        9 HOLE PEG TEST (IE)  Right: 4uat7vpl (NWFL)  Left: 23 40sec      STRENGTH (IE)  Right: 30#avg (NWFL)  Left: 65#, 60#, 55# avg 60#    -Letty Lundberg MS, OTR/L

## 2022-02-03 NOTE — PROGRESS NOTES
02/03/22 1236   Hello, [Guardians Name / Zeb Boyd, this is [Caller Ramu Ospina from [facility], and our clinical care team wanted to check on you / your child after your recent visit to the hospital  It will only take 3-5 minutes  Is this a good time? Discharge Call Type/ Specific Diagnosis: General Call   General Discharge Phone Call   Were your/your child's discharge instructions clear and understandable? Please tell me in your own words how to care for yourself/your child now that you're home Yes;Patient understood instructions   Have you filled your/your child's new prescriptions yet? Yes   What questions do you have about those medications? No questions   Are you/your child having any unusual symptoms or problems? (Specific to problem- i e , dressing, pain, bruising or swelling, procedure, etc ) No reported symptoms/problems   Do you have follow up appointment with your/your child's physician? Yes   Is there anything preventing you from keeping that appointment? No;Patient able to keep appointment   Are there any physicians, nurses, or hospital staff you would like us to recognize for doing a very good job? Nurse;PCA/Tech;PT/OT/RT/SLP  (Everyone did a belle job ( Eloisasteve Russell, Ed and [place]))   Thank you for taking the time to share with me about your care and recovery  Do you have any suggestions for us? No   This call resulted in: No interventions needed   Call Complete   Attempted Number of Calls 1   Discharge phone call complete? Complete   Hi, This is ________ from [facility]  This is just a courtesy call, and there is no need to call us back  Have a great day     (Called by [facility])

## 2022-02-04 NOTE — SPEECH THERAPY NOTE
[facility] Speech Therapy Discharge Summary    Pt admitted to [facility] [address] on 1/26/2022 dx Metastatic renal cell carcinoma to brain  Pt completed formalized cognitive linguistic assessment, CLQT+  Overall score when compared to age matched peers between 25 - 71 yrs  Score was 3 6 out of 4 0, which indicates cognitive skills to be WNL  Of note, pt was below criterion cutoff scores on (2) out of 10 tasks completed during this assesssment  Noted pt with MILD deficits in subtests Memory and Language as well as pt scoring on the lower side/cut off for "normal" for Attention and Executive Function skills  Pt verbalizes that he knows his attention and memory is poor, notes occ word finding  Pt engaged in cognitive linguistic communication tasks and was able to achieve some of his goals- supervision overall for all cognitive domains  Pt was successfully discharged home on 2/2/2022 with family support/supervision recommendations for further skilled SLP services as outpt as needed in order to increase independence and decreased burden of care

## 2022-02-07 ENCOUNTER — OFFICE VISIT (OUTPATIENT)
Dept: PHYSICAL THERAPY | Facility: CLINIC | Age: 57
End: 2022-02-07
Payer: COMMERCIAL

## 2022-02-07 DIAGNOSIS — C64.9 METASTATIC RENAL CELL CARCINOMA TO BRAIN (HCC): Primary | ICD-10-CM

## 2022-02-07 DIAGNOSIS — C79.31 METASTATIC RENAL CELL CARCINOMA TO BRAIN (HCC): Primary | ICD-10-CM

## 2022-02-07 DIAGNOSIS — R26.89 BALANCE DISORDER: ICD-10-CM

## 2022-02-07 PROCEDURE — 97112 NEUROMUSCULAR REEDUCATION: CPT

## 2022-02-07 PROCEDURE — 97110 THERAPEUTIC EXERCISES: CPT

## 2022-02-07 PROCEDURE — 97530 THERAPEUTIC ACTIVITIES: CPT

## 2022-02-07 NOTE — PROGRESS NOTES
Daily Note     Today's date: 2022  Patient name: Александр Can  : 1965  MRN: [id number]  Referring provider: Stefano Green MD  Dx:   Encounter Diagnosis     ICD-10-CM    1  Metastatic renal cell carcinoma to brain (HCC)  C79 31     C64 9    2  Balance disorder  R26 89                   Subjective:  No new c/o's since IE  Pt reports trying to walk more with b/l LE fatigue/weakness  Objective: See treatment diary below  Assessment: Tolerated treatment well  Good tolerance to exercise with verbal/tactile cues provided for proper technique and posture  Minimal LE muscle soreness/fatigue reported post treatment  Patient demonstrated fatigue post treatment and would benefit from continued PT  Continue to progress as tolerated  Plan: Continue per plan of care  Precautions: ACTIVE stage 4 metastatic renal cell carcinoma to brain  *POC to focus on functional strengthening/endurance/balance      Manuals /3            prn                                                    Neuro Re-Ed             t-band robberies NV Red 2x10           t-band rows NV            SACHIN activities (lat pull downs with bar, chest press with bar, etc) NV                                                                Ther Ex             Bike warm up NV 10' L1           UBE If indicated            VG DL squats NV 40% x3 min                                                                            Ther Activity             Functional marches-->gait NV Gait 20 ft x2           Functional heel raises NV 5"x5 each           Side stepping at bar NV Green tband 20 ft x4           BIODEX balance activity (WS, LOS, etc) NV nv                        Gait Training                                       Modalities             prn

## 2022-02-07 NOTE — PHYSICAL THERAPY NOTE
PT Discharge Summary    Pt made excellent functional progress during his rehab stay  Pt will return home with his family and continue with outpatiet therapy to maximize his potential Pt able to achieve all goals set and exceed goals of supervision on stairs to Independent  Pt with much improved strength, coordination and awareness of right side demonstrated by improved times on TUG and 5xSTS along with 49/56 on CONTI balance assessment  Pt not using a device at all for mobility  Safe discharge home

## 2022-02-11 ENCOUNTER — APPOINTMENT (OUTPATIENT)
Dept: LAB | Facility: MEDICAL CENTER | Age: 57
End: 2022-02-11
Payer: COMMERCIAL

## 2022-02-11 DIAGNOSIS — E87.1 HYPONATREMIA: ICD-10-CM

## 2022-02-11 LAB
ANION GAP SERPL CALCULATED.3IONS-SCNC: 4 MMOL/L (ref 4–13)
BUN SERPL-MCNC: 22 MG/DL (ref 5–25)
CALCIUM SERPL-MCNC: 9.4 MG/DL (ref 8.3–10.1)
CHLORIDE SERPL-SCNC: 107 MMOL/L (ref 100–108)
CO2 SERPL-SCNC: 27 MMOL/L (ref 21–32)
CREAT SERPL-MCNC: 0.84 MG/DL (ref 0.6–1.3)
GFR SERPL CREATININE-BSD FRML MDRD: 97 ML/MIN/1.73SQ M
GLUCOSE P FAST SERPL-MCNC: 96 MG/DL (ref 65–99)
POTASSIUM SERPL-SCNC: 4.4 MMOL/L (ref 3.5–5.3)
SODIUM SERPL-SCNC: 138 MMOL/L (ref 136–145)

## 2022-02-11 PROCEDURE — 36415 COLL VENOUS BLD VENIPUNCTURE: CPT

## 2022-02-11 PROCEDURE — 80048 BASIC METABOLIC PNL TOTAL CA: CPT

## 2022-02-14 ENCOUNTER — OFFICE VISIT (OUTPATIENT)
Dept: PHYSICAL THERAPY | Facility: CLINIC | Age: 57
End: 2022-02-14
Payer: COMMERCIAL

## 2022-02-14 DIAGNOSIS — C64.9 METASTATIC RENAL CELL CARCINOMA TO BRAIN (HCC): Primary | ICD-10-CM

## 2022-02-14 DIAGNOSIS — C79.31 METASTATIC RENAL CELL CARCINOMA TO BRAIN (HCC): Primary | ICD-10-CM

## 2022-02-14 DIAGNOSIS — R26.89 BALANCE DISORDER: ICD-10-CM

## 2022-02-14 PROCEDURE — 97112 NEUROMUSCULAR REEDUCATION: CPT

## 2022-02-14 PROCEDURE — 97530 THERAPEUTIC ACTIVITIES: CPT

## 2022-02-14 PROCEDURE — 97110 THERAPEUTIC EXERCISES: CPT

## 2022-02-14 NOTE — PROGRESS NOTES
Daily Note     Today's date: 2022  Patient name: Bernardo Campos  : 1965  MRN: [id number]  Referring provider: Karlo Cortez MD  Dx:   Encounter Diagnosis     ICD-10-CM    1  Metastatic renal cell carcinoma to brain (HCC)  C79 31     C64 9    2  Balance disorder  R26 89                   Subjective:  Pt reports feeling good after last visit and with HEP  Pt c/o some difficulty yet with UE coordination  Objective: See treatment diary below  Assessment: Tolerated treatment well  Good tolerance to progression of strengthening/stability exercise with verbal/tactile cues provided for proper technique and posture  Patient demonstrated fatigue post treatment and would benefit from continued PT  Continue to progress as tolerated  Plan: Continue per plan of care  Precautions: ACTIVE stage 4 metastatic renal cell carcinoma to brain  *POC to focus on functional strengthening/endurance/balance      Manuals 2/3 2/7 2/14          prn                                                    Neuro Re-Ed             t-band robberies NV Red 2x10 Green 2x10          t-band rows NV            SACHIN activities (lat pull downs with bar, chest press with bar, etc) NV  Rows and press 10 lb x15 ea                                                              Ther Ex             Bike warm up NV 10' L1 10' L1          UBE If indicated            VG DL squats NV 40% x3 min 40% x5 min                                                                           Ther Activity             Functional marches-->gait NV Gait 20 ft x2 Gait 20 ft x4          Functional heel raises NV 5"x5 each 5"x10 ea          Side stepping at bar NV Green tband 20 ft x4 Green 20 ft x4          BIODEX balance activity (WS, LOS, etc) NV nv                        Gait Training                                       Modalities             prn

## 2022-02-21 ENCOUNTER — APPOINTMENT (OUTPATIENT)
Dept: PHYSICAL THERAPY | Facility: CLINIC | Age: 57
End: 2022-02-21
Payer: COMMERCIAL

## 2022-02-23 ENCOUNTER — RA CDI HCC (OUTPATIENT)
Dept: OTHER | Facility: HOSPITAL | Age: 57
End: 2022-02-23

## 2022-02-23 NOTE — PROGRESS NOTES
[facility] 75  coding opportunities       Chart reviewed, no opportunity found: CHART REVIEWED, NO OPPORTUNITY FOUND                 Patients insurance company: [employer] (Medicare Advantage and Commercial)

## 2022-02-28 ENCOUNTER — APPOINTMENT (OUTPATIENT)
Dept: PHYSICAL THERAPY | Facility: CLINIC | Age: 57
End: 2022-02-28
Payer: COMMERCIAL

## 2022-03-25 ENCOUNTER — HOSPITAL ENCOUNTER (OUTPATIENT)
Dept: CT IMAGING | Facility: HOSPITAL | Age: 57
Discharge: HOME | End: 2022-03-25
Payer: COMMERCIAL

## 2022-03-25 DIAGNOSIS — R63.4 WEIGHT LOSS, ABNORMAL: ICD-10-CM

## 2022-03-25 DIAGNOSIS — C64.9 RENAL CELL CARCINOMA, UNSPECIFIED LATERALITY (HCC): ICD-10-CM

## 2022-03-25 PROCEDURE — 74177 CT ABD & PELVIS W/CONTRAST: CPT

## 2022-03-25 RX ADMIN — IOHEXOL 100 ML: 350 INJECTION, SOLUTION INTRAVENOUS at 11:27

## 2022-03-31 ENCOUNTER — TELEPHONE (OUTPATIENT)
Dept: FAMILY MEDICINE CLINIC | Facility: CLINIC | Age: 57
End: 2022-03-31

## 2022-04-08 ENCOUNTER — OFFICE VISIT (OUTPATIENT)
Dept: FAMILY MEDICINE CLINIC | Facility: CLINIC | Age: 57
End: 2022-04-08
Payer: COMMERCIAL

## 2022-04-08 VITALS
BODY MASS INDEX: 27.77 KG/M2 | HEIGHT: 72 IN | DIASTOLIC BLOOD PRESSURE: 78 MMHG | SYSTOLIC BLOOD PRESSURE: 120 MMHG | WEIGHT: 205 LBS | TEMPERATURE: 97.9 F

## 2022-04-08 DIAGNOSIS — C64.9 METASTATIC RENAL CELL CARCINOMA TO BRAIN (HCC): ICD-10-CM

## 2022-04-08 DIAGNOSIS — C79.31 METASTATIC RENAL CELL CARCINOMA TO BRAIN (HCC): ICD-10-CM

## 2022-04-08 DIAGNOSIS — I10 ESSENTIAL HYPERTENSION: Primary | ICD-10-CM

## 2022-04-08 DIAGNOSIS — E78.2 MIXED HYPERLIPIDEMIA: ICD-10-CM

## 2022-04-08 DIAGNOSIS — R73.9 HYPERGLYCEMIA: ICD-10-CM

## 2022-04-08 DIAGNOSIS — C64.9 SARCOMATOID RENAL CELL CARCINOMA (HCC): ICD-10-CM

## 2022-04-08 DIAGNOSIS — C78.7 LIVER METASTASES (HCC): ICD-10-CM

## 2022-04-08 DIAGNOSIS — H26.32 CATARACT OF LEFT EYE DUE TO DRUG: ICD-10-CM

## 2022-04-08 PROBLEM — E87.1 HYPONATREMIA: Status: RESOLVED | Noted: 2022-01-26 | Resolved: 2022-04-08

## 2022-04-08 PROBLEM — M25.552 LEFT HIP PAIN: Status: RESOLVED | Noted: 2018-10-22 | Resolved: 2022-04-08

## 2022-04-08 PROBLEM — R63.4 WEIGHT LOSS, ABNORMAL: Status: RESOLVED | Noted: 2019-03-13 | Resolved: 2022-04-08

## 2022-04-08 PROBLEM — R13.10 DYSPHAGIA: Status: RESOLVED | Noted: 2022-01-26 | Resolved: 2022-04-08

## 2022-04-08 PROCEDURE — 99214 OFFICE O/P EST MOD 30 MIN: CPT | Performed by: FAMILY MEDICINE

## 2022-04-08 PROCEDURE — 3008F BODY MASS INDEX DOCD: CPT | Performed by: FAMILY MEDICINE

## 2022-04-08 RX ORDER — AMLODIPINE BESYLATE 10 MG/1
TABLET ORAL
COMMUNITY
Start: 2022-03-27

## 2022-04-08 NOTE — PROGRESS NOTES
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[address] )  Follow with Oncology at [facility]       Diagnoses and all orders for this visit:    Essential hypertension  -     CBC and differential; Future  -     Comprehensive metabolic panel; Future    Sarcomatoid renal cell carcinoma (HCC)    Hyperglycemia  -     Hemoglobin A1C; Future    Mixed hyperlipidemia  -     CBC and differential; Future  -     Comprehensive metabolic panel; Future  -     Lipid panel; Future  -     TSH, 3rd generation; Future    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  -     Ambulatory Referral to Ophthalmology; Future    Metastatic renal cell carcinoma to brain [facility])    Liver metastases ([facility] [address] )    Other orders  -     amLODIPine (NORVASC) 10 mg tablet          Subjective:   Chief Complaint   Patient presents with    Hyperlipidemia    Hypertension     no refills needed           Patient ID: Conner Owens  is a 64 y o  male  Presents to the office for follow-up on his hypertension, hyperlipidemia, sugar, weight loss, kidney cancer with liver and brain Mets  Patient has had a very stormy year  Regionally was found to have recurrence of his kidney cancer and then was found to have metastasis to the brain and to his liver  He had surgery on the brain lesions  He went through extensive rehab which has brought him back to full function  He has not had any seizures at any point  He continues on Keppra however  He has been on multiple courses of chemotherapy and the last liver lesions some had resolved some or smaller  His treatment is ongoing  He is hoping to go to [facility] in their feet near future  He was recently diagnosed as having a cataract in his left eye that needs to be removed  This was felt to be either from his chemo or the steroids        The following portions of the patient's history were reviewed and updated as appropriate: allergies, current medications, past family history, past medical history, past social history, past surgical history and problem list     Review of Systems   Constitutional: Positive for fatigue  Negative for appetite change, chills, diaphoresis, fever and unexpected weight change  HENT: Negative for congestion, ear pain, hearing loss, nosebleeds, postnasal drip, rhinorrhea, sinus pressure, sore throat, tinnitus and trouble swallowing  Eyes: Positive for visual disturbance (Left eye is blurry secondary to recently diagnosed cataract)  Negative for photophobia, pain, discharge, redness and itching  Respiratory: Negative for cough, chest tightness, shortness of breath and wheezing  Cardiovascular: Negative for chest pain, palpitations and leg swelling  Denies orthopnea , dyspnea on exertion   Gastrointestinal: Negative for abdominal distention, abdominal pain, blood in stool, constipation, diarrhea, nausea and vomiting  Endocrine: Negative  Genitourinary: Negative for difficulty urinating, dysuria, flank pain, frequency, hematuria and urgency  Denies nocturia , erectile dysfunction   Musculoskeletal: Negative for arthralgias, back pain, gait problem, joint swelling and myalgias  Skin: Negative for pallor, rash and wound  Denies skin lesions   Allergic/Immunologic: Negative for environmental allergies, food allergies and immunocompromised state  Neurological: Negative for dizziness, tremors, seizures, syncope, speech difficulty, weakness, numbness and headaches  Hematological: Negative for adenopathy  Does not bruise/bleed easily  Psychiatric/Behavioral: Negative for behavioral problems, confusion, dysphoric mood (Patient has a very realistic outlook and understanding of his disease process ), sleep disturbance and suicidal ideas  The patient is not nervous/anxious            Objective:      /78 Temp 97 9 °F (36 6 °C)   Ht 6' (1 829 m)   Wt 93 kg (205 lb)   BMI 27 80 kg/m²          Physical Exam  Constitutional:       Appearance: Normal appearance  He is well-developed  HENT:      Head: Normocephalic and atraumatic  Right Ear: Tympanic membrane and ear canal normal       Left Ear: Tympanic membrane and ear canal normal       Nose: Nose normal    Eyes:      Extraocular Movements: Extraocular movements intact  Conjunctiva/sclera: Conjunctivae normal    Neck:      Thyroid: No thyromegaly  Vascular: No JVD  Trachea: No tracheal deviation  Cardiovascular:      Rate and Rhythm: Normal rate and regular rhythm  Heart sounds: No murmur heard  Pulmonary:      Effort: Pulmonary effort is normal       Breath sounds: Normal breath sounds  No wheezing or rales  Abdominal:      General: Bowel sounds are normal       Palpations: Abdomen is soft  There is no mass  Tenderness: There is no abdominal tenderness  There is no guarding or rebound  Musculoskeletal:         General: No tenderness or deformity  Cervical back: Normal range of motion and neck supple  Lymphadenopathy:      Cervical: No cervical adenopathy  Skin:     General: Skin is warm and dry  Capillary Refill: Capillary refill takes less than 2 seconds  Findings: No lesion or rash  Nails: There is no clubbing  Neurological:      Mental Status: He is alert and oriented to person, place, and time  Cranial Nerves: No cranial nerve deficit  Motor: No abnormal muscle tone  Coordination: Coordination normal       Deep Tendon Reflexes: Reflexes are normal and symmetric   Reflexes normal    Psychiatric:         Mood and Affect: Mood normal          Behavior: Behavior normal          Judgment: Judgment normal

## 2022-04-10 ENCOUNTER — HOSPITAL ENCOUNTER (OUTPATIENT)
Dept: CT IMAGING | Facility: HOSPITAL | Age: 57
Discharge: HOME | End: 2022-04-10
Payer: COMMERCIAL

## 2022-04-10 DIAGNOSIS — C64.9 MALIGNANT NEOPLASM OF UNSPECIFIED KIDNEY, EXCEPT RENAL PELVIS (HCC): ICD-10-CM

## 2022-04-10 PROCEDURE — G1004 CDSM NDSC: HCPCS

## 2022-04-10 PROCEDURE — 71260 CT THORAX DX C+: CPT

## 2022-04-10 RX ADMIN — IOHEXOL 85 ML: 350 INJECTION, SOLUTION INTRAVENOUS at 10:02

## 2022-04-14 ENCOUNTER — TELEPHONE (OUTPATIENT)
Dept: FAMILY MEDICINE CLINIC | Facility: CLINIC | Age: 57
End: 2022-04-14

## 2022-04-14 ENCOUNTER — APPOINTMENT (OUTPATIENT)
Dept: LAB | Facility: HOSPITAL | Age: 57
End: 2022-04-14
Payer: COMMERCIAL

## 2022-04-14 DIAGNOSIS — I10 ESSENTIAL HYPERTENSION: ICD-10-CM

## 2022-04-14 DIAGNOSIS — E78.2 MIXED HYPERLIPIDEMIA: ICD-10-CM

## 2022-04-14 LAB
ALBUMIN SERPL BCP-MCNC: 3.8 G/DL (ref 3.5–5)
ALP SERPL-CCNC: 81 U/L (ref 46–116)
ALT SERPL W P-5'-P-CCNC: 19 U/L (ref 12–78)
ANION GAP SERPL CALCULATED.3IONS-SCNC: 12 MMOL/L (ref 4–13)
AST SERPL W P-5'-P-CCNC: 16 U/L (ref 5–45)
BILIRUB SERPL-MCNC: 0.95 MG/DL (ref 0.2–1)
BUN SERPL-MCNC: 23 MG/DL (ref 5–25)
CALCIUM SERPL-MCNC: 8.7 MG/DL (ref 8.3–10.1)
CHLORIDE SERPL-SCNC: 104 MMOL/L (ref 100–108)
CO2 SERPL-SCNC: 24 MMOL/L (ref 21–32)
CREAT SERPL-MCNC: 1.15 MG/DL (ref 0.6–1.3)
GFR SERPL CREATININE-BSD FRML MDRD: 70 ML/MIN/1.73SQ M
GLUCOSE SERPL-MCNC: 95 MG/DL (ref 65–140)
POTASSIUM SERPL-SCNC: 3.7 MMOL/L (ref 3.5–5.3)
PROT SERPL-MCNC: 7.2 G/DL (ref 6.4–8.2)
SODIUM SERPL-SCNC: 140 MMOL/L (ref 136–145)

## 2022-04-14 PROCEDURE — 80053 COMPREHEN METABOLIC PANEL: CPT

## 2022-04-14 PROCEDURE — 36415 COLL VENOUS BLD VENIPUNCTURE: CPT

## 2022-04-15 ENCOUNTER — HOSPITAL ENCOUNTER (OUTPATIENT)
Dept: MRI IMAGING | Facility: HOSPITAL | Age: 57
Discharge: HOME | End: 2022-04-15
Payer: COMMERCIAL

## 2022-04-15 DIAGNOSIS — C79.31 SECONDARY MALIGNANT NEOPLASM OF BRAIN AND SPINAL CORD (HCC): ICD-10-CM

## 2022-04-15 DIAGNOSIS — C79.49 SECONDARY MALIGNANT NEOPLASM OF BRAIN AND SPINAL CORD (HCC): ICD-10-CM

## 2022-04-15 PROCEDURE — G1004 CDSM NDSC: HCPCS

## 2022-04-15 PROCEDURE — 70553 MRI BRAIN STEM W/O & W/DYE: CPT

## 2022-04-15 PROCEDURE — A9585 GADOBUTROL INJECTION: HCPCS | Performed by: RADIOLOGY

## 2022-04-15 RX ADMIN — GADOBUTROL 9 ML: 604.72 INJECTION INTRAVENOUS at 09:34

## 2022-05-06 NOTE — PROGRESS NOTES
05/03/22 0928   Hello, [Guardians Name / Alvin Christine, this is [Caller Carolina Patience from [facility], and our clinical care team wanted to check on you / your child after your recent visit to the hospital  It will only take 3-5 minutes  Is this a good time? Discharge Call Type/ Specific Diagnosis: ARC 90 Day Call   ARC 90 Day Discharge Call   Assessment Source 1   Call Complete for 90 Days call back? Complete   Call Complete   Hi, This is ________ from [facility]  This is just a courtesy call, and there is no need to call us back  Have a great day     (Called by [facility])

## 2022-07-11 ENCOUNTER — TELEPHONE (OUTPATIENT)
Dept: FAMILY MEDICINE CLINIC | Facility: CLINIC | Age: 57
End: 2022-07-11

## 2022-07-15 ENCOUNTER — HOSPITAL ENCOUNTER (OUTPATIENT)
Dept: MRI IMAGING | Facility: HOSPITAL | Age: 57
Discharge: HOME | End: 2022-07-15
Payer: COMMERCIAL

## 2022-07-15 DIAGNOSIS — C79.31 METASTASIS TO BRAIN (HCC): ICD-10-CM

## 2022-07-15 PROCEDURE — G1004 CDSM NDSC: HCPCS

## 2022-07-15 PROCEDURE — A9585 GADOBUTROL INJECTION: HCPCS | Performed by: RADIOLOGY

## 2022-07-15 PROCEDURE — 70553 MRI BRAIN STEM W/O & W/DYE: CPT

## 2022-07-15 RX ADMIN — GADOBUTROL 9 ML: 604.72 INJECTION INTRAVENOUS at 12:41

## 2023-01-01 ENCOUNTER — HOME CARE VISIT (OUTPATIENT)
Dept: HOME HOSPICE | Facility: HOSPICE | Age: 58
End: 2023-01-01

## 2023-01-01 DIAGNOSIS — C79.31 METASTATIC RENAL CELL CARCINOMA TO BRAIN (HCC): ICD-10-CM

## 2023-01-01 DIAGNOSIS — C64.9 METASTATIC RENAL CELL CARCINOMA TO BRAIN (HCC): ICD-10-CM

## 2023-01-01 DIAGNOSIS — Z51.5 HOSPICE CARE PATIENT: Primary | ICD-10-CM

## 2023-01-01 RX ORDER — OXYCODONE HYDROCHLORIDE 5 MG/1
5 TABLET ORAL EVERY 4 HOURS PRN
Qty: 30 TABLET | Refills: 0 | Status: SHIPPED | OUTPATIENT
Start: 2023-01-01 | End: 2023-01-01

## 2023-01-01 RX ORDER — MORPHINE SULFATE 100 MG/5ML
SOLUTION, CONCENTRATE ORAL
Qty: 60 ML | Refills: 0 | Status: SHIPPED | OUTPATIENT
Start: 2023-01-01

## 2023-01-01 RX ORDER — LORAZEPAM 0.5 MG/1
0.5 TABLET ORAL EVERY 6 HOURS PRN
Qty: 30 TABLET | Refills: 0 | Status: SHIPPED | OUTPATIENT
Start: 2023-01-01 | End: 2023-01-01

## 2023-01-01 RX ORDER — BISACODYL 10 MG
10 SUPPOSITORY, RECTAL RECTAL DAILY PRN
Qty: 4 SUPPOSITORY | Refills: 0 | Status: SHIPPED | OUTPATIENT
Start: 2023-01-01

## 2023-03-10 ENCOUNTER — HOSPITAL ENCOUNTER (OUTPATIENT)
Facility: MEDICAL CENTER | Age: 58
Discharge: HOME | End: 2023-03-10

## 2023-03-10 DIAGNOSIS — M54.2 CERVICALGIA: ICD-10-CM

## 2023-03-10 DIAGNOSIS — C64.2 RENAL CELL CARCINOMA OF LEFT KIDNEY (HCC): ICD-10-CM

## 2023-03-10 RX ADMIN — GADOBUTROL 9 ML: 604.72 INJECTION INTRAVENOUS at 11:03

## 2023-03-22 ENCOUNTER — TRANSCRIBE ORDERS (OUTPATIENT)
Dept: HOME HEALTH SERVICES | Facility: HOME HEALTHCARE | Age: 58
End: 2023-03-22

## 2023-03-22 ENCOUNTER — HOSPICE ADMISSION (OUTPATIENT)
Dept: HOME HOSPICE | Facility: HOSPICE | Age: 58
End: 2023-03-22

## 2023-03-22 ENCOUNTER — HOME CARE VISIT (OUTPATIENT)
Dept: HOME HEALTH SERVICES | Facility: HOME HEALTHCARE | Age: 58
End: 2023-03-22

## 2023-03-22 DIAGNOSIS — C64.9 RENAL CELL CARCINOMA, UNSPECIFIED LATERALITY (HCC): Primary | ICD-10-CM

## 2023-03-22 NOTE — Clinical Note
Bhavani Jajaabdi 60yo male  10 1965 currently at [facility]  Hx metastatic renal cell carcinoma (diagnosed ) s/p left nephrectomy with brain stem lesion and recurrence in RP mass and liver, lung s/p left frontal and left parieto-occipital carniotomies on 2022  Did undergo chemo and radiation  Tolerating Dexamethasone and Oxycodone PO  ECOG 1  Does not want to continue with treatment  PPS 50  Approve, RLOC?

## 2023-03-24 ENCOUNTER — HOME CARE VISIT (OUTPATIENT)
Dept: HOME HEALTH SERVICES | Facility: HOME HEALTHCARE | Age: 58
End: 2023-03-24

## 2023-03-24 VITALS
RESPIRATION RATE: 16 BRPM | SYSTOLIC BLOOD PRESSURE: 118 MMHG | HEIGHT: 71 IN | HEART RATE: 68 BPM | BODY MASS INDEX: 25.62 KG/M2 | DIASTOLIC BLOOD PRESSURE: 72 MMHG | WEIGHT: 183 LBS

## 2023-03-24 DIAGNOSIS — Z51.5 HOSPICE CARE PATIENT: Primary | ICD-10-CM

## 2023-03-24 RX ORDER — OXYCODONE HYDROCHLORIDE 5 MG/1
5 TABLET ORAL EVERY 4 HOURS PRN
Qty: 30 TABLET | Refills: 0 | Status: SHIPPED | OUTPATIENT
Start: 2023-03-24 | End: 2023-03-25

## 2023-03-24 RX ORDER — LORAZEPAM 0.5 MG/1
0.5 TABLET ORAL EVERY 6 HOURS PRN
Qty: 30 TABLET | Refills: 0 | Status: SHIPPED | OUTPATIENT
Start: 2023-03-24 | End: 2023-03-25

## 2023-03-25 ENCOUNTER — HOME CARE VISIT (OUTPATIENT)
Dept: HOME HOSPICE | Facility: HOSPICE | Age: 58
End: 2023-03-25

## 2023-03-26 ENCOUNTER — HOME CARE VISIT (OUTPATIENT)
Dept: HOME HOSPICE | Facility: HOSPICE | Age: 58
End: 2023-03-26

## 2023-03-26 DIAGNOSIS — Z51.5 HOSPICE CARE PATIENT: Primary | ICD-10-CM

## 2023-03-26 RX ORDER — OXYCODONE HYDROCHLORIDE 5 MG/1
5 TABLET ORAL EVERY 4 HOURS PRN
Qty: 30 TABLET | Refills: 0 | Status: SHIPPED | OUTPATIENT
Start: 2023-03-26 | End: 2023-04-04 | Stop reason: SDUPTHER

## 2023-03-27 ENCOUNTER — HOME CARE VISIT (OUTPATIENT)
Dept: HOME HOSPICE | Facility: HOSPICE | Age: 58
End: 2023-03-27

## 2023-03-29 ENCOUNTER — HOME CARE VISIT (OUTPATIENT)
Dept: HOME HEALTH SERVICES | Facility: HOME HEALTHCARE | Age: 58
End: 2023-03-29

## 2023-03-29 VITALS
HEART RATE: 76 BPM | DIASTOLIC BLOOD PRESSURE: 70 MMHG | TEMPERATURE: 97.3 F | RESPIRATION RATE: 16 BRPM | SYSTOLIC BLOOD PRESSURE: 112 MMHG | OXYGEN SATURATION: 97 %

## 2023-03-31 DIAGNOSIS — Z51.5 HOSPICE CARE PATIENT: Primary | ICD-10-CM

## 2023-03-31 DIAGNOSIS — C79.31 METASTATIC RENAL CELL CARCINOMA TO BRAIN (HCC): ICD-10-CM

## 2023-03-31 DIAGNOSIS — C64.9 METASTATIC RENAL CELL CARCINOMA TO BRAIN (HCC): ICD-10-CM

## 2023-03-31 RX ORDER — DEXAMETHASONE 4 MG/1
4 TABLET ORAL
Qty: 30 TABLET | Refills: 0 | Status: SHIPPED | OUTPATIENT
Start: 2023-03-31 | End: 2023-04-08

## 2023-03-31 RX ORDER — LORAZEPAM 2 MG/ML
2 CONCENTRATE ORAL
Qty: 30 ML | Refills: 0 | Status: SHIPPED | OUTPATIENT
Start: 2023-03-31

## 2023-03-31 NOTE — PROGRESS NOTES
3/31/2023 9:20 AM  [facility] CBC patient requests BC patient medications  Filled electronically via Epic as per [place] State Law  Requested Prescriptions     Signed Prescriptions Disp Refills   • dexamethasone (DECADRON) 4 mg tablet 30 tablet 0     Sig: Take 1 tablet (4 mg total) by mouth daily with breakfast   • LORazepam (ATIVAN) 2 mg/mL concentrated solution 30 mL 0     Sig: Take 1 mL (2 mg total) by mouth every 10 (ten) minutes as needed for seizures Administer 2mg po/SL every 10 minutes as needed for seizure activity for 3 doses  Notify Hospice immediately  [facility], [address] [place] [address] [facility]  Hospice Answering Service: [phone]  You can find me on [employer]!

## 2023-04-04 NOTE — TELEPHONE ENCOUNTER
4/4/2023 2:40 PM  [facility] CBC patient requests refill of CII medication  Filled electronically via Epic as per [place] State Law  Requested Prescriptions     Signed Prescriptions Disp Refills   • LORazepam (ATIVAN) 0 5 mg tablet 30 tablet 0     Sig: Take 1 tablet (0 5 mg total) by mouth every 6 (six) hours as needed for anxiety (or sob)     Authorizing Provider: Amara Thurston   • oxyCODONE (ROXICODONE) 5 immediate release tablet 30 tablet 0     Sig: Take 1 tablet (5 mg total) by mouth every 4 (four) hours as needed (pain or dyspnea) Max Daily Amount: 30 mg     Authorizing Provider: Misael Monaco [facility]  Hospice Answering Service: [phone]  You can find me on [employer]!

## 2023-04-07 NOTE — PROGRESS NOTES
4/7/2023 4:42 PM  [facility] home patient requests new schedule II medication d/t change in patient status  Filled electronically via Epic as per [place] State Law  Requested Prescriptions     Signed Prescriptions Disp Refills   • Morphine Sulfate, Concentrate, 20 mg/mL concentrated solution 60 mL 0     Sig: Take 0 25 mL (5 mg total) by mouth every 6 (six) hours  May also take 0 25 mL (5 mg total) every 2 (two) hours as needed (pain / SOB)  Max Daily Amount: 80 mg    • bisacodyl (DULCOLAX) 10 mg suppository 4 suppository 0     Sig: Insert 1 suppository (10 mg total) into the rectum daily as needed for constipation If no BM in 48 hours       [facility] [address] Answering Service: [phone]  You can find me on [employer]!
